# Patient Record
Sex: MALE | Race: WHITE | NOT HISPANIC OR LATINO | URBAN - METROPOLITAN AREA
[De-identification: names, ages, dates, MRNs, and addresses within clinical notes are randomized per-mention and may not be internally consistent; named-entity substitution may affect disease eponyms.]

---

## 2020-11-23 ENCOUNTER — OUTPATIENT (OUTPATIENT)
Dept: OUTPATIENT SERVICES | Facility: HOSPITAL | Age: 45
LOS: 1 days | End: 2020-11-23
Payer: COMMERCIAL

## 2020-11-23 DIAGNOSIS — Z11.59 ENCOUNTER FOR SCREENING FOR OTHER VIRAL DISEASES: ICD-10-CM

## 2020-11-23 LAB — SARS-COV-2 RNA SPEC QL NAA+PROBE: SIGNIFICANT CHANGE UP

## 2020-11-23 PROCEDURE — U0003: CPT

## 2020-11-24 DIAGNOSIS — Z11.59 ENCOUNTER FOR SCREENING FOR OTHER VIRAL DISEASES: ICD-10-CM

## 2023-06-01 ENCOUNTER — EMERGENCY (EMERGENCY)
Facility: HOSPITAL | Age: 48
LOS: 0 days | Discharge: HOME | End: 2023-06-01
Attending: EMERGENCY MEDICINE
Payer: COMMERCIAL

## 2023-06-01 VITALS
HEART RATE: 60 BPM | TEMPERATURE: 98 F | DIASTOLIC BLOOD PRESSURE: 70 MMHG | RESPIRATION RATE: 16 BRPM | OXYGEN SATURATION: 98 % | SYSTOLIC BLOOD PRESSURE: 118 MMHG

## 2023-06-01 VITALS — HEIGHT: 70 IN | WEIGHT: 179.9 LBS

## 2023-06-01 DIAGNOSIS — R11.2 NAUSEA WITH VOMITING, UNSPECIFIED: ICD-10-CM

## 2023-06-01 DIAGNOSIS — H55.09 OTHER FORMS OF NYSTAGMUS: ICD-10-CM

## 2023-06-01 DIAGNOSIS — H81.11 BENIGN PAROXYSMAL VERTIGO, RIGHT EAR: ICD-10-CM

## 2023-06-01 DIAGNOSIS — R42 DIZZINESS AND GIDDINESS: ICD-10-CM

## 2023-06-01 LAB
ALBUMIN SERPL ELPH-MCNC: 3.9 G/DL — SIGNIFICANT CHANGE UP (ref 3.3–5)
ALP SERPL-CCNC: 77 U/L — SIGNIFICANT CHANGE UP (ref 40–120)
ALT FLD-CCNC: 44 U/L — SIGNIFICANT CHANGE UP (ref 12–78)
ANION GAP SERPL CALC-SCNC: 1 MMOL/L — LOW (ref 5–17)
AST SERPL-CCNC: 23 U/L — SIGNIFICANT CHANGE UP (ref 15–37)
BASOPHILS # BLD AUTO: 0.05 K/UL — SIGNIFICANT CHANGE UP (ref 0–0.2)
BASOPHILS NFR BLD AUTO: 0.4 % — SIGNIFICANT CHANGE UP (ref 0–2)
BILIRUB SERPL-MCNC: 0.3 MG/DL — SIGNIFICANT CHANGE UP (ref 0.2–1.2)
BUN SERPL-MCNC: 15 MG/DL — SIGNIFICANT CHANGE UP (ref 7–23)
CALCIUM SERPL-MCNC: 9.2 MG/DL — SIGNIFICANT CHANGE UP (ref 8.5–10.1)
CHLORIDE SERPL-SCNC: 108 MMOL/L — SIGNIFICANT CHANGE UP (ref 96–108)
CO2 SERPL-SCNC: 29 MMOL/L — SIGNIFICANT CHANGE UP (ref 22–31)
CREAT SERPL-MCNC: 1.27 MG/DL — SIGNIFICANT CHANGE UP (ref 0.5–1.3)
EGFR: 70 ML/MIN/1.73M2 — SIGNIFICANT CHANGE UP
EOSINOPHIL # BLD AUTO: 0.08 K/UL — SIGNIFICANT CHANGE UP (ref 0–0.5)
EOSINOPHIL NFR BLD AUTO: 0.7 % — SIGNIFICANT CHANGE UP (ref 0–6)
GLUCOSE SERPL-MCNC: 104 MG/DL — HIGH (ref 70–99)
HCT VFR BLD CALC: 46.7 % — SIGNIFICANT CHANGE UP (ref 39–50)
HGB BLD-MCNC: 15.9 G/DL — SIGNIFICANT CHANGE UP (ref 13–17)
IMM GRANULOCYTES NFR BLD AUTO: 0.9 % — SIGNIFICANT CHANGE UP (ref 0–0.9)
LYMPHOCYTES # BLD AUTO: 2.48 K/UL — SIGNIFICANT CHANGE UP (ref 1–3.3)
LYMPHOCYTES # BLD AUTO: 22.1 % — SIGNIFICANT CHANGE UP (ref 13–44)
MAGNESIUM SERPL-MCNC: 2.3 MG/DL — SIGNIFICANT CHANGE UP (ref 1.6–2.6)
MCHC RBC-ENTMCNC: 30.6 PG — SIGNIFICANT CHANGE UP (ref 27–34)
MCHC RBC-ENTMCNC: 34 GM/DL — SIGNIFICANT CHANGE UP (ref 32–36)
MCV RBC AUTO: 90 FL — SIGNIFICANT CHANGE UP (ref 80–100)
MONOCYTES # BLD AUTO: 0.59 K/UL — SIGNIFICANT CHANGE UP (ref 0–0.9)
MONOCYTES NFR BLD AUTO: 5.2 % — SIGNIFICANT CHANGE UP (ref 2–14)
NEUTROPHILS # BLD AUTO: 7.94 K/UL — HIGH (ref 1.8–7.4)
NEUTROPHILS NFR BLD AUTO: 70.7 % — SIGNIFICANT CHANGE UP (ref 43–77)
PHOSPHATE SERPL-MCNC: 3.4 MG/DL — SIGNIFICANT CHANGE UP (ref 2.5–4.5)
PLATELET # BLD AUTO: 274 K/UL — SIGNIFICANT CHANGE UP (ref 150–400)
POTASSIUM SERPL-MCNC: 5 MMOL/L — SIGNIFICANT CHANGE UP (ref 3.5–5.3)
POTASSIUM SERPL-SCNC: 5 MMOL/L — SIGNIFICANT CHANGE UP (ref 3.5–5.3)
PROT SERPL-MCNC: 8.1 GM/DL — SIGNIFICANT CHANGE UP (ref 6–8.3)
RBC # BLD: 5.19 M/UL — SIGNIFICANT CHANGE UP (ref 4.2–5.8)
RBC # FLD: 13 % — SIGNIFICANT CHANGE UP (ref 10.3–14.5)
SODIUM SERPL-SCNC: 138 MMOL/L — SIGNIFICANT CHANGE UP (ref 135–145)
WBC # BLD: 11.24 K/UL — HIGH (ref 3.8–10.5)
WBC # FLD AUTO: 11.24 K/UL — HIGH (ref 3.8–10.5)

## 2023-06-01 PROCEDURE — 96374 THER/PROPH/DIAG INJ IV PUSH: CPT

## 2023-06-01 PROCEDURE — 36415 COLL VENOUS BLD VENIPUNCTURE: CPT

## 2023-06-01 PROCEDURE — 83735 ASSAY OF MAGNESIUM: CPT

## 2023-06-01 PROCEDURE — 99284 EMERGENCY DEPT VISIT MOD MDM: CPT

## 2023-06-01 PROCEDURE — 70450 CT HEAD/BRAIN W/O DYE: CPT | Mod: 26,MA

## 2023-06-01 PROCEDURE — 70450 CT HEAD/BRAIN W/O DYE: CPT | Mod: MA

## 2023-06-01 PROCEDURE — 85025 COMPLETE CBC W/AUTO DIFF WBC: CPT

## 2023-06-01 PROCEDURE — 80053 COMPREHEN METABOLIC PANEL: CPT

## 2023-06-01 PROCEDURE — 84100 ASSAY OF PHOSPHORUS: CPT

## 2023-06-01 PROCEDURE — 99284 EMERGENCY DEPT VISIT MOD MDM: CPT | Mod: 25

## 2023-06-01 RX ORDER — ONDANSETRON 8 MG/1
4 TABLET, FILM COATED ORAL ONCE
Refills: 0 | Status: COMPLETED | OUTPATIENT
Start: 2023-06-01 | End: 2023-06-01

## 2023-06-01 RX ORDER — MECLIZINE HCL 12.5 MG
1 TABLET ORAL
Qty: 21 | Refills: 0
Start: 2023-06-01 | End: 2023-06-07

## 2023-06-01 RX ORDER — SODIUM CHLORIDE 9 MG/ML
1000 INJECTION INTRAMUSCULAR; INTRAVENOUS; SUBCUTANEOUS ONCE
Refills: 0 | Status: COMPLETED | OUTPATIENT
Start: 2023-06-01 | End: 2023-06-01

## 2023-06-01 RX ORDER — ONDANSETRON 8 MG/1
1 TABLET, FILM COATED ORAL
Qty: 15 | Refills: 0
Start: 2023-06-01 | End: 2023-06-05

## 2023-06-01 RX ORDER — MECLIZINE HCL 12.5 MG
25 TABLET ORAL ONCE
Refills: 0 | Status: COMPLETED | OUTPATIENT
Start: 2023-06-01 | End: 2023-06-01

## 2023-06-01 RX ADMIN — SODIUM CHLORIDE 1000 MILLILITER(S): 9 INJECTION INTRAMUSCULAR; INTRAVENOUS; SUBCUTANEOUS at 18:29

## 2023-06-01 RX ADMIN — ONDANSETRON 4 MILLIGRAM(S): 8 TABLET, FILM COATED ORAL at 17:35

## 2023-06-01 RX ADMIN — Medication 25 MILLIGRAM(S): at 17:35

## 2023-06-01 NOTE — ED STATDOCS - PATIENT PORTAL LINK FT
You can access the FollowMyHealth Patient Portal offered by [facility] by registering at the following website: [email or web address]. By joining "[employer]’s FollowMyHealth portal, you will also be able to view your health information using other applications (apps) compatible with our system.

## 2023-06-01 NOTE — ED STATDOCS - NSFOLLOWUPINSTRUCTIONS_ED_ALL_ED_FT
go  Vertigo is the feeling that you or your surroundings are moving when they are not. Benign positional vertigo is the most common form of vertigo. This is usually a harmless condition (benign). This condition is positional. This means that symptoms are triggered by certain movements and positions.    This condition can be dangerous if it occurs while you are doing something that could cause harm to yourself or others. This includes activities such as driving or operating machinery.    What are the causes?  The inner ear has fluid-filled canals that help your brain sense movement and balance. When the fluid moves, the brain receives messages about your body's position.    With benign positional vertigo, calcium crystals in the inner ear break free and disturb the inner ear area. This causes your brain to receive confusing messages about your body's position.    What increases the risk?  You are more likely to develop this condition if:  You are a woman.  You are 50 years of age or older.  You have recently had a head injury.  You have an inner ear disease.  What are the signs or symptoms?  Symptoms of this condition usually happen when you move your head or your eyes in different directions. Symptoms may start suddenly and usually last for less than a minute. They include:  Loss of balance and falling.  Feeling like you are spinning or moving.  Feeling like your surroundings are spinning or moving.  Nausea and vomiting.  Blurred vision.  Dizziness.  Involuntary eye movement (nystagmus).  Symptoms can be mild and cause only minor problems, or they can be severe and interfere with daily life. Episodes of benign positional vertigo may return (recur) over time. Symptoms may also improve over time.    How is this diagnosed?  This condition may be diagnosed based on:  Your medical history.  A physical exam of the head, neck, and ears.  Positional tests to check for or stimulate vertigo. You may be asked to turn your head and change positions, such as going from sitting to lying down. A health care provider will watch for symptoms of vertigo.  You may be referred to a health care provider who specializes in ear, nose, and throat problems (ENT or otolaryngologist) or a provider who specializes in disorders of the nervous system (neurologist).    How is this treated?  Medical person standing behind sitting patient using both hands to move patient's head to another position.  This condition may be treated in a session in which your health care provider moves your head in specific positions to help the displaced crystals in your inner ear move. Treatment for this condition may take several sessions. Surgery may be needed in severe cases, but this is rare.    In some cases, benign positional vertigo may resolve on its own in 2–4 weeks.    Follow these instructions at home:  Safety    Move slowly. Avoid sudden body or head movements or certain positions, as told by your health care provider.  Avoid driving or operating machinery until your health care provider says it is safe.  Avoid doing any tasks that would be dangerous to you or others if vertigo occurs.  If you have trouble walking or keeping your balance, try using a cane for stability. If you feel dizzy or unstable, sit down right away.  Return to your normal activities as told by your health care provider. Ask your health care provider what activities are safe for you.  General instructions    Take over-the-counter and prescription medicines only as told by your health care provider.  Drink enough fluid to keep your urine pale yellow.  Keep all follow-up visits. This is important.  Contact a health care provider if:  You have a fever.  Your condition gets worse or you develop new symptoms.  Your family or friends notice any behavioral changes.  You have nausea or vomiting that gets worse.  You have numbness or a prickling and tingling sensation.  Get help right away if you:  Have difficulty speaking or moving.  Are always dizzy or faint.  Develop severe headaches.  Have weakness in your legs or arms.  Have changes in your hearing or vision.  Develop a stiff neck.  Develop sensitivity to light.  These symptoms may represent a serious problem that is an emergency. Do not wait to see if the symptoms will go away. Get medical help right away. Call your local emergency services (911 in the [place] Do not drive yourself to the hospital.    Summary  Vertigo is the feeling that you or your surroundings are moving when they are not. Benign positional vertigo is the most common form of vertigo.  This condition is caused by calcium crystals in the inner ear that become displaced. This causes a disturbance in an area of the inner ear that helps your brain sense movement and balance.  Symptoms include loss of balance and falling, feeling that you or your surroundings are moving, nausea and vomiting, and blurred vision.  This condition can be diagnosed based on symptoms, a physical exam, and positional tests.  Follow safety instructions as told by your health care provider and keep all follow-up visits. This is important.  This information is not intended to replace advice given to you by your health care provider. Make sure you discuss any questions you have with your health care provider.    Document Revised: 11/17/2021 Document Reviewed: 11/17/2021

## 2023-06-01 NOTE — ED ADULT NURSE NOTE - NSFALLHARMRISKINTERV_ED_ALL_ED

## 2023-06-01 NOTE — ED STATDOCS - NEUROLOGICAL, MLM
+right beating horizontal nystagmus, fatigable. no facial droop, no dysarthria, no aphasia, no dysmetria. no focal motor deficits, +dixhall pike on left.

## 2023-06-01 NOTE — ED STATDOCS - PROGRESS NOTE DETAILS
47 y/o Male presents to ED c/o nausea, vomiting and dizziness.  Pt reports he had dizziness, spinning sensation, sweating, and vomiting x 2 last night.  This morning, pt also had same episode when he stood up to get out of bed.  Vomited x 2.  Reports he feels better laying down with eyes closed.  Pt is worse when he stands up.  Denies falls, head injury.  Neg prior episodes.  Pt also reports recent nasal congestion, allergies 2 weeks ago.   Diana Corey PA-C

## 2023-06-01 NOTE — ED STATDOCS - CLINICAL SUMMARY MEDICAL DECISION MAKING FREE TEXT BOX
Labs and CT negative.  Patient with symptoms and exam consistent with BPPV.  Treated with medications in the ED.  D/c home on meclizine, f/u with ENT. Return precautions given.

## 2023-06-01 NOTE — ED STATDOCS - OBJECTIVE STATEMENT
48 year old male with no significant past medical hx presents to the ED c/o nausea, vomiting since last night. States he ate chocolate, cheesecake, honey last night and x2 hours later, felt dizzy, sweaty, and began vomiting. Patient also endorses dizziness worse with movement, describes it as feeling lightheaded. States he felt dizzy when he woke up and later began feeling nauseous and vomited. +allergies a few weeks ago. Denies diarrhea, fever, tinnitus. Denies chest pain or SOB.

## 2023-06-01 NOTE — ED ADULT TRIAGE NOTE - CHIEF COMPLAINT QUOTE
pt presents to ED c/o nausea, vomiting, dizziness since last night. pt denies fevers. unable to hold food, liquids down.

## 2023-12-07 NOTE — ED ADULT NURSE NOTE - CHIEF COMPLAINT QUOTE
Spoke with the patient and advised him of the message below . Pt voiced understanding .  Call complete Thank you so much!! We appreciate it pt presents to ED c/o nausea, vomiting, dizziness since last night. pt denies fevers. unable to hold food, liquids down.

## 2024-11-17 ENCOUNTER — INPATIENT (INPATIENT)
Facility: HOSPITAL | Age: 49
LOS: 0 days | Discharge: ANOTHER HOSPITAL | DRG: 282 | End: 2024-11-18
Attending: INTERNAL MEDICINE | Admitting: INTERNAL MEDICINE
Payer: COMMERCIAL

## 2024-11-17 VITALS
TEMPERATURE: 98 F | SYSTOLIC BLOOD PRESSURE: 121 MMHG | OXYGEN SATURATION: 98 % | WEIGHT: 220.02 LBS | RESPIRATION RATE: 18 BRPM | HEIGHT: 69 IN | DIASTOLIC BLOOD PRESSURE: 80 MMHG | HEART RATE: 70 BPM

## 2024-11-17 DIAGNOSIS — I21.4 NON-ST ELEVATION (NSTEMI) MYOCARDIAL INFARCTION: ICD-10-CM

## 2024-11-17 LAB
ALBUMIN SERPL ELPH-MCNC: 3.8 G/DL — SIGNIFICANT CHANGE UP (ref 3.3–5)
ALP SERPL-CCNC: 81 U/L — SIGNIFICANT CHANGE UP (ref 40–120)
ALT FLD-CCNC: 44 U/L — SIGNIFICANT CHANGE UP (ref 12–78)
ANION GAP SERPL CALC-SCNC: 1 MMOL/L — LOW (ref 5–17)
APTT BLD: 39.2 SEC — HIGH (ref 24.5–35.6)
AST SERPL-CCNC: 22 U/L — SIGNIFICANT CHANGE UP (ref 15–37)
BASOPHILS # BLD AUTO: 0.05 K/UL — SIGNIFICANT CHANGE UP (ref 0–0.2)
BASOPHILS NFR BLD AUTO: 0.5 % — SIGNIFICANT CHANGE UP (ref 0–2)
BILIRUB SERPL-MCNC: 0.3 MG/DL — SIGNIFICANT CHANGE UP (ref 0.2–1.2)
BUN SERPL-MCNC: 19 MG/DL — SIGNIFICANT CHANGE UP (ref 7–23)
CALCIUM SERPL-MCNC: 9 MG/DL — SIGNIFICANT CHANGE UP (ref 8.5–10.1)
CHLORIDE SERPL-SCNC: 109 MMOL/L — HIGH (ref 96–108)
CO2 SERPL-SCNC: 28 MMOL/L — SIGNIFICANT CHANGE UP (ref 22–31)
CREAT SERPL-MCNC: 1.3 MG/DL — SIGNIFICANT CHANGE UP (ref 0.5–1.3)
EGFR: 67 ML/MIN/1.73M2 — SIGNIFICANT CHANGE UP
EOSINOPHIL # BLD AUTO: 0.32 K/UL — SIGNIFICANT CHANGE UP (ref 0–0.5)
EOSINOPHIL NFR BLD AUTO: 3.1 % — SIGNIFICANT CHANGE UP (ref 0–6)
GLUCOSE SERPL-MCNC: 116 MG/DL — HIGH (ref 70–99)
HCT VFR BLD CALC: 43.6 % — SIGNIFICANT CHANGE UP (ref 39–50)
HGB BLD-MCNC: 15 G/DL — SIGNIFICANT CHANGE UP (ref 13–17)
IMM GRANULOCYTES NFR BLD AUTO: 0.6 % — SIGNIFICANT CHANGE UP (ref 0–0.9)
INR BLD: 0.94 RATIO — SIGNIFICANT CHANGE UP (ref 0.85–1.16)
LYMPHOCYTES # BLD AUTO: 3.85 K/UL — HIGH (ref 1–3.3)
LYMPHOCYTES # BLD AUTO: 37.1 % — SIGNIFICANT CHANGE UP (ref 13–44)
MCHC RBC-ENTMCNC: 30.5 PG — SIGNIFICANT CHANGE UP (ref 27–34)
MCHC RBC-ENTMCNC: 34.4 G/DL — SIGNIFICANT CHANGE UP (ref 32–36)
MCV RBC AUTO: 88.6 FL — SIGNIFICANT CHANGE UP (ref 80–100)
MONOCYTES # BLD AUTO: 0.84 K/UL — SIGNIFICANT CHANGE UP (ref 0–0.9)
MONOCYTES NFR BLD AUTO: 8.1 % — SIGNIFICANT CHANGE UP (ref 2–14)
NEUTROPHILS # BLD AUTO: 5.25 K/UL — SIGNIFICANT CHANGE UP (ref 1.8–7.4)
NEUTROPHILS NFR BLD AUTO: 50.6 % — SIGNIFICANT CHANGE UP (ref 43–77)
NRBC # BLD: 0 /100 WBCS — SIGNIFICANT CHANGE UP (ref 0–0)
NT-PROBNP SERPL-SCNC: 46 PG/ML — SIGNIFICANT CHANGE UP (ref 0–125)
PLATELET # BLD AUTO: 313 K/UL — SIGNIFICANT CHANGE UP (ref 150–400)
POTASSIUM SERPL-MCNC: 4.1 MMOL/L — SIGNIFICANT CHANGE UP (ref 3.5–5.3)
POTASSIUM SERPL-SCNC: 4.1 MMOL/L — SIGNIFICANT CHANGE UP (ref 3.5–5.3)
PROT SERPL-MCNC: 7.6 G/DL — SIGNIFICANT CHANGE UP (ref 6–8.3)
PROTHROM AB SERPL-ACNC: 11 SEC — SIGNIFICANT CHANGE UP (ref 9.9–13.4)
RBC # BLD: 4.92 M/UL — SIGNIFICANT CHANGE UP (ref 4.2–5.8)
RBC # FLD: 13.2 % — SIGNIFICANT CHANGE UP (ref 10.3–14.5)
SODIUM SERPL-SCNC: 138 MMOL/L — SIGNIFICANT CHANGE UP (ref 135–145)
TROPONIN I, HIGH SENSITIVITY RESULT: 207.6 NG/L — HIGH
TROPONIN I, HIGH SENSITIVITY RESULT: 24.1 NG/L — SIGNIFICANT CHANGE UP
TROPONIN I, HIGH SENSITIVITY RESULT: 271.4 NG/L — HIGH
WBC # BLD: 10.37 K/UL — SIGNIFICANT CHANGE UP (ref 3.8–10.5)
WBC # FLD AUTO: 10.37 K/UL — SIGNIFICANT CHANGE UP (ref 3.8–10.5)

## 2024-11-17 PROCEDURE — 93010 ELECTROCARDIOGRAM REPORT: CPT

## 2024-11-17 PROCEDURE — 74174 CTA ABD&PLVS W/CONTRAST: CPT | Mod: 26,MC

## 2024-11-17 PROCEDURE — 99291 CRITICAL CARE FIRST HOUR: CPT

## 2024-11-17 PROCEDURE — 71275 CT ANGIOGRAPHY CHEST: CPT | Mod: 26,MC

## 2024-11-17 RX ORDER — MAGNESIUM, ALUMINUM HYDROXIDE 200-225/5
30 SUSPENSION, ORAL (FINAL DOSE FORM) ORAL EVERY 4 HOURS
Refills: 0 | Status: DISCONTINUED | OUTPATIENT
Start: 2024-11-17 | End: 2024-11-18

## 2024-11-17 RX ORDER — ACETAMINOPHEN 500MG 500 MG/1
1000 TABLET, COATED ORAL ONCE
Refills: 0 | Status: COMPLETED | OUTPATIENT
Start: 2024-11-17 | End: 2024-11-17

## 2024-11-17 RX ORDER — HEPARIN SODIUM,PORCINE 1000/ML
6000 VIAL (ML) INJECTION EVERY 6 HOURS
Refills: 0 | Status: DISCONTINUED | OUTPATIENT
Start: 2024-11-17 | End: 2024-11-18

## 2024-11-17 RX ORDER — PANTOPRAZOLE SODIUM 40 MG/1
40 TABLET, DELAYED RELEASE ORAL DAILY
Refills: 0 | Status: DISCONTINUED | OUTPATIENT
Start: 2024-11-17 | End: 2024-11-18

## 2024-11-17 RX ORDER — HEPARIN SODIUM,PORCINE 1000/ML
VIAL (ML) INJECTION
Qty: 25000 | Refills: 0 | Status: DISCONTINUED | OUTPATIENT
Start: 2024-11-17 | End: 2024-11-18

## 2024-11-17 RX ORDER — CLOPIDOGREL 75 MG/1
300 TABLET, FILM COATED ORAL ONCE
Refills: 0 | Status: COMPLETED | OUTPATIENT
Start: 2024-11-17 | End: 2024-11-17

## 2024-11-17 RX ORDER — ALPRAZOLAM 0.5 MG
0.25 TABLET ORAL EVERY 8 HOURS
Refills: 0 | Status: DISCONTINUED | OUTPATIENT
Start: 2024-11-17 | End: 2024-11-18

## 2024-11-17 RX ORDER — HEPARIN SODIUM,PORCINE 1000/ML
5000 VIAL (ML) INJECTION ONCE
Refills: 0 | Status: COMPLETED | OUTPATIENT
Start: 2024-11-17 | End: 2024-11-17

## 2024-11-17 RX ORDER — SODIUM CHLORIDE 9 MG/ML
1000 INJECTION, SOLUTION INTRAMUSCULAR; INTRAVENOUS; SUBCUTANEOUS ONCE
Refills: 0 | Status: COMPLETED | OUTPATIENT
Start: 2024-11-17 | End: 2024-11-17

## 2024-11-17 RX ORDER — TICAGRELOR 90 MG/1
180 TABLET ORAL ONCE
Refills: 0 | Status: COMPLETED | OUTPATIENT
Start: 2024-11-17 | End: 2024-11-17

## 2024-11-17 RX ADMIN — Medication 1000 UNIT(S)/HR: at 19:29

## 2024-11-17 RX ADMIN — SODIUM CHLORIDE 1000 MILLILITER(S): 9 INJECTION, SOLUTION INTRAMUSCULAR; INTRAVENOUS; SUBCUTANEOUS at 15:38

## 2024-11-17 RX ADMIN — Medication 30 MILLILITER(S): at 15:38

## 2024-11-17 RX ADMIN — TICAGRELOR 180 MILLIGRAM(S): 90 TABLET ORAL at 19:28

## 2024-11-17 RX ADMIN — ACETAMINOPHEN 500MG 1000 MILLIGRAM(S): 500 TABLET, COATED ORAL at 16:48

## 2024-11-17 RX ADMIN — ACETAMINOPHEN 500MG 400 MILLIGRAM(S): 500 TABLET, COATED ORAL at 15:38

## 2024-11-17 RX ADMIN — Medication 5000 UNIT(S): at 19:28

## 2024-11-17 NOTE — ED ADULT NURSE NOTE - SUICIDE SCREENING QUESTION 2
ED resident cross cover, giving 45 of sodium phos for phos of 1.1.        Sherie Wesis, DO  Resident  09/16/21 1040     Patient unable to complete

## 2024-11-17 NOTE — ED PROVIDER NOTE - CLINICAL SUMMARY MEDICAL DECISION MAKING FREE TEXT BOX
Caroline ATTG   49-year-old male without any significant past medical additions chief complaint of chest pain.  Symptom started approximately 1 hour ago prior to arrival to the ED.  Patient has hard time describing pain, states it feels like it is inbetween his shoulder blades and will come and go.     GENERAL: Awake, alert, NAD  LUNGS: CTAB, no wheezes or crackles   CARDIAC: RRR, no m/r/g  ABDOMEN: Soft, , non tender, non distended, no rebound, no guarding  BACK: No midline spinal tenderness, no CVA tenderness no tenderness along the back  EXT: No edema, no calf tenderness,, no deformities.  NEURO: A&Ox3. Moving all extremities.  SKIN: Warm and dry. No rash.  PSYCH: Normal affect.    given hx and pe will need dissection study, at the end of the visit / encounter w/ pt pt states pain calmed down will need cardiac /pulm ruelas

## 2024-11-17 NOTE — ED PROVIDER NOTE - PROGRESS NOTE DETAILS
Caroline ATTG  Cardiology aware of NSTEMI recommended heparin    pt chest pain free   PCP Celestina Moser

## 2024-11-17 NOTE — CONSULT NOTE ADULT - SUBJECTIVE AND OBJECTIVE BOX
[facility]     Patient is a 49y old  Male who presents with a chief complaint of     HPI:      HPI:    49y Male for Cardiology Consult    PAST MEDICAL & SURGICAL HISTORY:      FAMILY HISTORY:      SOCIAL HISTORY:   Alcohol:  Smoking:    Allergies    No Known Allergies    Intolerances        MEDICATIONS  (STANDING):  heparin  Infusion.  Unit(s)/Hr (10 mL/Hr) IV Continuous <Continuous>    MEDICATIONS  (PRN):  aluminum hydroxide/magnesium hydroxide/simethicone Suspension 30 milliLiter(s) Oral every 4 hours PRN Dyspepsia  heparin   Injectable 6000 Unit(s) IV Push every 6 hours PRN For aPTT less than 40      REVIEW OF SYSTEMS:  CONSTITUTIONAL: No fever, weight loss, chills, shakes, or fat  RESPIRATORY: No cough, wheezing, hemoptysis, or shortness of breath  CARDIOVASCULAR: No chest pain, dyspnea, palpitations, dizziness, syncope, paroxysmal nocturnal dyspnea, orthopnea, or arm or leg swelling  GASTROINTESTINAL: No abdominal  or epigastric pain, nausea, vomiting, hematemesis, diarrhea, constipation, melena or bright red bloo  NEUROLOGICAL: No headaches, memory loss, slurred speech, limb weakness, loss of strength, numbness, or tremors  SKIN: No itching, burning, rashes, or lesions  ENDOCRINE: No heat or cold intolerance, or hair loss  MUSCULOSKELETAL: No joint pain or swelling, muscle, back, or extremity pain  HEME/LYMPH: No easy bruising or bleeding gums  ALLERY AND IMMUNOLOGIC: No hives or rash.    Vital Signs Last 24 Hrs  T(C): 36.8 (17 Nov 2024 19:42), Max: 36.8 (17 Nov 2024 14:48)  T(F): 98.2 (17 Nov 2024 19:42), Max: 98.3 (17 Nov 2024 15:44)  HR: 58 (17 Nov 2024 19:42) (58 - 70)  BP: 123/73 (17 Nov 2024 19:42) (120/80 - 123/73)  BP(mean): --  RR: 18 (17 Nov 2024 19:42) (18 - 18)  SpO2: 97% (17 Nov 2024 19:42) (97% - 98%)    Parameters below as of 17 Nov 2024 19:42  Patient On (Oxygen Delivery Method): room air        PHYSICAL EXAM:  HEAD:  Atraumatic, Normocephalic  EYES: EOMI, PERRLA, conjunctiva and sclera clear  NECK: Supple and normal thyroid.  No JVD or carotid bruit.   HEART: S1, S2 regular , 1/6 soft ejection systolic murmur in mitral area , no thrill and no gallops .  PULMONARY: Bilateral vesicular breathing , few scattered ronchi and few scattered rales are present .  ABDOMEN: Soft nontender and positive bowl sounds   EXTREMITIES:  No clubbing, cyanosis, or pedal  edema  NEUROLOGICAL: AAOX3 , no focal deficit .    LABS:                        15.0   10.37 )-----------( 313      ( 17 Nov 2024 15:22 )             43.6     11-17    138  |  109[H]  |  19  ----------------------------<  116[H]  4.1   |  28  |  1.30    Ca    9.0      17 Nov 2024 15:22    TPro  7.6  /  Alb  3.8  /  TBili  0.3  /  DBili  x   /  AST  22  /  ALT  44  /  AlkPhos  81  11-17        PT/INR - ( 17 Nov 2024 15:22 )   PT: 11.0 sec;   INR: 0.94 ratio         PTT - ( 17 Nov 2024 15:22 )  PTT:39.2 sec  Urinalysis Basic - ( 17 Nov 2024 15:22 )    Color: x / Appearance: x / SG: x / pH: x  Gluc: 116 mg/dL / Ketone: x  / Bili: x / Urobili: x   Blood: x / Protein: x / Nitrite: x   Leuk Esterase: x / RBC: x / WBC x   Sq Epi: x / Non Sq Epi: x / Bacteria: x      BNP      EKG:  ECHO:  IMAGING:    Assessment and Plan :     Will continue to follow during hospital course and give further recommendations as needed . Thanks for your referral . if any questions please contact me at office [phone] [phone])  MOR BLISS MD [place] [id number]  [address]  OFFICE : [phone]   CELL : [phone]  CARDIOLOGY CONSULT :     Patient is a 49y old  Male who presents with a chief complaint of has retrosternal chest pain since 1 PM today and radiation of pain to back and jaw and left arm and call 911 and came to ER . patient felt better in ER after aspirin and nitrates . Patient has  no chest pain at the time of my examination and feeling much better . Patient never has this kind of chest pain in the past . Patient has very active life style and can walk 1 mile and even more prior to this episode although patient has sedentary life style . Patient understands all questions very well and answers appropriately . Patient history obtained from patient himself , RN , PA , PMD and other consultants notes . Patient has mild SOB with chest pain and feeling better now .    PAST MEDICAL & SURGICAL HISTORY:  Hyperlipidemia     FAMILY HISTORY: H/O CAD in family       SOCIAL HISTORY:   Alcohol: Patient drinks alcohol socially occasionally   Smoking: Patient smokes 10-15 cigarette daily .     Allergies    No Known Allergies    Intolerances        MEDICATIONS  (STANDING):  heparin  Infusion.  Unit(s)/Hr (10 mL/Hr) IV Continuous <Continuous>    MEDICATIONS  (PRN):  aluminum hydroxide/magnesium hydroxide/simethicone Suspension 30 milliLiter(s) Oral every 4 hours PRN Dyspepsia  heparin   Injectable 6000 Unit(s) IV Push every 6 hours PRN For aPTT less than 40      REVIEW OF SYSTEMS:  CONSTITUTIONAL: No fever .  RESPIRATORY: No cough, wheezing, but has mild  shortness of breath which is better .  CARDIOVASCULAR: Patient has  chest pain which is better and SOB also better and has no ,  palpitations, dizziness, syncope, paroxysmal nocturnal dyspnea,  or arm or leg swelling  GASTROINTESTINAL: No abdominal  or epigastric pain, nausea, vomiting, hematemesis, diarrhea, constipation, melena or bright red blood  NEUROLOGICAL: No headaches, memory loss, slurred speech, limb weakness, loss of strength, numbness, or tremors  SKIN: No itching, burning, rashes, or lesions  ENDOCRINE: No heat or cold intolerance, or hair loss  MUSCULOSKELETAL: Patient has mild arthritis   HEME/LYMPH: No easy bruising or bleeding gums  ALLERGY AND IMMUNOLOGIC: No hives or rash.    Vital Signs Last 24 Hrs  T(C): 36.8 (17 Nov 2024 19:42), Max: 36.8 (17 Nov 2024 14:48)  T(F): 98.2 (17 Nov 2024 19:42), Max: 98.3 (17 Nov 2024 15:44)  HR: 58 (17 Nov 2024 19:42) (58 - 70)  BP: 123/73 (17 Nov 2024 19:42) (120/80 - 123/73)  BP(mean): --  RR: 18 (17 Nov 2024 19:42) (18 - 18)  SpO2: 97% (17 Nov 2024 19:42) (97% - 98%)    Parameters below as of 17 Nov 2024 19:42  Patient On (Oxygen Delivery Method): room air        PHYSICAL EXAM:  HEAD:  Atraumatic, Normocephalic  EYES: EOMI, PERRLA, conjunctiva and sclera clear  NECK: Supple and normal thyroid.  No JVD or carotid bruit.   HEART: S1, S2 regular , 1/6 soft ejection systolic murmur in mitral area , no thrill and no gallops .  PULMONARY: Bilateral vesicular breathing , few scattered rhonchi and few scattered rales are present .  ABDOMEN: Soft nontender and positive bowl sounds   EXTREMITIES:  No clubbing, cyanosis, or pedal  edema  NEUROLOGICAL: AAOX3 , no focal deficit .  Skin : NO rashes .  Musculoskeletal : No joint swellings .     LABS:                        15.0   10.37 )-----------( 313      ( 17 Nov 2024 15:22 )             43.6     11-17    138  |  109[H]  |  19  ----------------------------<  116[H]  4.1   |  28  |  1.30    Ca    9.0      17 Nov 2024 15:22    TPro  7.6  /  Alb  3.8  /  TBili  0.3  /  DBili  x   /  AST  22  /  ALT  44  /  AlkPhos  81  11-17        PT/INR - ( 17 Nov 2024 15:22 )   PT: 11.0 sec;   INR: 0.94 ratio         PTT - ( 17 Nov 2024 15:22 )  PTT:39.2 sec  Urinalysis Basic - ( 17 Nov 2024 15:22 )    Color: x / Appearance: x / SG: x / pH: x  Gluc: 116 mg/dL / Ketone: x  / Bili: x / Urobili: x   Blood: x / Protein: x / Nitrite: x   Leuk Esterase: x / RBC: x / WBC x   Sq Epi: x / Non Sq Epi: x / Bacteria: x            EKG: NSR , non specific ST-T changes .    Assessment and Plan :   49y old  Male who presents with a chief complaint of has retrosternal chest pain since 1 PM today and radiation of pain to back and jaw and left arm and call 911 and came to ER . patient felt better in ER after aspirin and nitrates . Patient has  no chest pain at the time of my examination and feeling much better . Patient never has this kind of chest pain in the past . Patient has very active life style and can walk 1 mile and even more prior to this episode although patient has sedentary life style . Patient understands all questions very well and answers appropriately . Patient history obtained from patient himself , RN , PA , PMD and other consultants notes . Patient has mild SOB with chest pain and feeling better now . Patient has elevated Troponin I levels and trending up . Patient has ACS , NON-ST CELIA . Continue aspirin , I/V heparin drip , Plavix and metoprolol and atorvastatin and nitrates as ordered . Will arrange cardiac cath in am . Patient cardiac wise stable and cleared for cardiac cath . Patient understands cardiac cath procedures and agrees with that . Will talk with cath lab in am for cardiac cath . Rest of medications as such .   Will continue to follow during hospital course and give further recommendations as needed . Thanks for your referral . if any questions please contact me at office ([phone] cell [phone])

## 2024-11-17 NOTE — CONSULT NOTE ADULT - TIME BILLING
in direct care of patient , reviewing labs and other results and adjusting medications and in discussion with other consultants , RN , PMD and ER physician and PA

## 2024-11-17 NOTE — ED PROVIDER NOTE - CRITICAL CARE ATTENDING CONTRIBUTION TO CARE
Patient has NSTEMI requiring critical care spoke to cardiology elevated troponin 10 times increased.  After experiencing a severe episode of back pain.   Requiring a heparin drip

## 2024-11-17 NOTE — ED ADULT NURSE REASSESSMENT NOTE - NSFALLHARMRISKINTERV_ED_ALL_ED

## 2024-11-18 ENCOUNTER — RESULT REVIEW (OUTPATIENT)
Age: 49
End: 2024-11-18

## 2024-11-18 ENCOUNTER — INPATIENT (INPATIENT)
Facility: HOSPITAL | Age: 49
LOS: 8 days | Discharge: HOME HEALTH | End: 2024-11-27
Attending: THORACIC SURGERY (CARDIOTHORACIC VASCULAR SURGERY) | Admitting: THORACIC SURGERY (CARDIOTHORACIC VASCULAR SURGERY)
Payer: COMMERCIAL

## 2024-11-18 ENCOUNTER — TRANSCRIPTION ENCOUNTER (OUTPATIENT)
Age: 49
End: 2024-11-18

## 2024-11-18 VITALS
RESPIRATION RATE: 18 BRPM | DIASTOLIC BLOOD PRESSURE: 85 MMHG | HEART RATE: 59 BPM | SYSTOLIC BLOOD PRESSURE: 142 MMHG | OXYGEN SATURATION: 100 % | TEMPERATURE: 98 F

## 2024-11-18 DIAGNOSIS — I24.9 ACUTE ISCHEMIC HEART DISEASE, UNSPECIFIED: ICD-10-CM
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
## 2024-11-18 DIAGNOSIS — M79.A11 NONTRAUMATIC COMPARTMENT SYNDROME OF RIGHT UPPER EXTREMITY: ICD-10-CM

## 2024-11-18 DIAGNOSIS — J98.11 ATELECTASIS: ICD-10-CM
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
## 2024-11-18 DIAGNOSIS — I21.4 NON-ST ELEVATION (NSTEMI) MYOCARDIAL INFARCTION: ICD-10-CM

## 2024-11-18 DIAGNOSIS — Z29.9 ENCOUNTER FOR PROPHYLACTIC MEASURES, UNSPECIFIED: ICD-10-CM

## 2024-11-18 DIAGNOSIS — I97.190 OTHER POSTPROCEDURAL CARDIAC FUNCTIONAL DISTURBANCES FOLLOWING CARDIAC SURGERY: ICD-10-CM

## 2024-11-18 DIAGNOSIS — I71.43 INFRARENAL ABDOMINAL AORTIC ANEURYSM, WITHOUT RUPTURE: ICD-10-CM

## 2024-11-18 DIAGNOSIS — I48.91 UNSPECIFIED ATRIAL FIBRILLATION: ICD-10-CM

## 2024-11-18 DIAGNOSIS — F17.200 NICOTINE DEPENDENCE, UNSPECIFIED, UNCOMPLICATED: ICD-10-CM

## 2024-11-18 DIAGNOSIS — K76.0 FATTY (CHANGE OF) LIVER, NOT ELSEWHERE CLASSIFIED: ICD-10-CM

## 2024-11-18 DIAGNOSIS — R91.8 OTHER NONSPECIFIC ABNORMAL FINDING OF LUNG FIELD: ICD-10-CM

## 2024-11-18 DIAGNOSIS — K59.00 CONSTIPATION, UNSPECIFIED: ICD-10-CM

## 2024-11-18 DIAGNOSIS — D68.32 HEMORRHAGIC DISORDER DUE TO EXTRINSIC CIRCULATING ANTICOAGULANTS: ICD-10-CM

## 2024-11-18 DIAGNOSIS — I25.110 ATHEROSCLEROTIC HEART DISEASE OF NATIVE CORONARY ARTERY WITH UNSTABLE ANGINA PECTORIS: ICD-10-CM

## 2024-11-18 DIAGNOSIS — T45.515A ADVERSE EFFECT OF ANTICOAGULANTS, INITIAL ENCOUNTER: ICD-10-CM

## 2024-11-18 DIAGNOSIS — D62 ACUTE POSTHEMORRHAGIC ANEMIA: ICD-10-CM

## 2024-11-18 DIAGNOSIS — R00.1 BRADYCARDIA, UNSPECIFIED: ICD-10-CM

## 2024-11-18 DIAGNOSIS — L76.32 POSTPROCEDURAL HEMATOMA OF SKIN AND SUBCUTANEOUS TISSUE FOLLOWING OTHER PROCEDURE: ICD-10-CM

## 2024-11-18 PROBLEM — Z78.9 OTHER SPECIFIED HEALTH STATUS: Chronic | Status: ACTIVE | Noted: 2023-06-03

## 2024-11-18 LAB
A1C WITH ESTIMATED AVERAGE GLUCOSE RESULT: 5.6 % — SIGNIFICANT CHANGE UP (ref 4–5.6)
A1C WITH ESTIMATED AVERAGE GLUCOSE RESULT: 5.7 % — HIGH (ref 4–5.6)
ALBUMIN SERPL ELPH-MCNC: 3.2 G/DL — LOW (ref 3.3–5)
ALBUMIN SERPL ELPH-MCNC: 4.3 G/DL — SIGNIFICANT CHANGE UP (ref 3.3–5)
ALP SERPL-CCNC: 73 U/L — SIGNIFICANT CHANGE UP (ref 40–120)
ALP SERPL-CCNC: 80 U/L — SIGNIFICANT CHANGE UP (ref 40–120)
ALT FLD-CCNC: 28 U/L — SIGNIFICANT CHANGE UP (ref 10–45)
ALT FLD-CCNC: 39 U/L — SIGNIFICANT CHANGE UP (ref 12–78)
ANION GAP SERPL CALC-SCNC: 10 MMOL/L — SIGNIFICANT CHANGE UP (ref 5–17)
ANION GAP SERPL CALC-SCNC: 11 MMOL/L — SIGNIFICANT CHANGE UP (ref 5–17)
APPEARANCE UR: CLEAR — SIGNIFICANT CHANGE UP
APTT BLD: 37.7 SEC — HIGH (ref 24.5–35.6)
APTT BLD: 53.6 SEC — HIGH (ref 24.5–35.6)
AST SERPL-CCNC: 21 U/L — SIGNIFICANT CHANGE UP (ref 10–40)
AST SERPL-CCNC: 25 U/L — SIGNIFICANT CHANGE UP (ref 15–37)
BASOPHILS # BLD AUTO: 0.05 K/UL — SIGNIFICANT CHANGE UP (ref 0–0.2)
BASOPHILS NFR BLD AUTO: 0.5 % — SIGNIFICANT CHANGE UP (ref 0–2)
BILIRUB SERPL-MCNC: 0.2 MG/DL — SIGNIFICANT CHANGE UP (ref 0.2–1.2)
BILIRUB SERPL-MCNC: 0.3 MG/DL — SIGNIFICANT CHANGE UP (ref 0.2–1.2)
BILIRUB UR-MCNC: NEGATIVE — SIGNIFICANT CHANGE UP
BLD GP AB SCN SERPL QL: NEGATIVE — SIGNIFICANT CHANGE UP
BUN SERPL-MCNC: 14 MG/DL — SIGNIFICANT CHANGE UP (ref 7–23)
BUN SERPL-MCNC: 19 MG/DL — SIGNIFICANT CHANGE UP (ref 7–23)
CALCIUM SERPL-MCNC: 7.9 MG/DL — LOW (ref 8.5–10.1)
CALCIUM SERPL-MCNC: 8.9 MG/DL — SIGNIFICANT CHANGE UP (ref 8.4–10.5)
CHLORIDE SERPL-SCNC: 104 MMOL/L — SIGNIFICANT CHANGE UP (ref 96–108)
CHLORIDE SERPL-SCNC: 109 MMOL/L — HIGH (ref 96–108)
CHOLEST SERPL-MCNC: 262 MG/DL — HIGH
CHOLEST SERPL-MCNC: 278 MG/DL — HIGH
CK MB CFR SERPL CALC: 5 NG/ML — SIGNIFICANT CHANGE UP (ref 0–6.7)
CK SERPL-CCNC: 188 U/L — SIGNIFICANT CHANGE UP (ref 30–200)
CO2 SERPL-SCNC: 21 MMOL/L — LOW (ref 22–31)
CO2 SERPL-SCNC: 22 MMOL/L — SIGNIFICANT CHANGE UP (ref 22–31)
COLOR SPEC: YELLOW — SIGNIFICANT CHANGE UP
CREAT SERPL-MCNC: 0.99 MG/DL — SIGNIFICANT CHANGE UP (ref 0.5–1.3)
CREAT SERPL-MCNC: 1 MG/DL — SIGNIFICANT CHANGE UP (ref 0.5–1.3)
CRP SERPL HS-MCNC: 3.34 MG/L — HIGH (ref 0–3)
DIFF PNL FLD: NEGATIVE — SIGNIFICANT CHANGE UP
EGFR: 92 ML/MIN/1.73M2 — SIGNIFICANT CHANGE UP
EGFR: 93 ML/MIN/1.73M2 — SIGNIFICANT CHANGE UP
EOSINOPHIL # BLD AUTO: 0.23 K/UL — SIGNIFICANT CHANGE UP (ref 0–0.5)
EOSINOPHIL NFR BLD AUTO: 2.4 % — SIGNIFICANT CHANGE UP (ref 0–6)
ESTIMATED AVERAGE GLUCOSE: 114 MG/DL — SIGNIFICANT CHANGE UP (ref 68–114)
ESTIMATED AVERAGE GLUCOSE: 117 MG/DL — HIGH (ref 68–114)
GLUCOSE SERPL-MCNC: 146 MG/DL — HIGH (ref 70–99)
GLUCOSE SERPL-MCNC: 97 MG/DL — SIGNIFICANT CHANGE UP (ref 70–99)
GLUCOSE UR QL: NEGATIVE MG/DL — SIGNIFICANT CHANGE UP
HCT VFR BLD CALC: 40.6 % — SIGNIFICANT CHANGE UP (ref 39–50)
HCT VFR BLD CALC: 41.2 % — SIGNIFICANT CHANGE UP (ref 39–50)
HCT VFR BLD CALC: 43.8 % — SIGNIFICANT CHANGE UP (ref 39–50)
HDLC SERPL-MCNC: 21 MG/DL — LOW
HDLC SERPL-MCNC: 26 MG/DL — LOW
HGB BLD-MCNC: 14.2 G/DL — SIGNIFICANT CHANGE UP (ref 13–17)
HGB BLD-MCNC: 14.2 G/DL — SIGNIFICANT CHANGE UP (ref 13–17)
HGB BLD-MCNC: 14.9 G/DL — SIGNIFICANT CHANGE UP (ref 13–17)
IMM GRANULOCYTES NFR BLD AUTO: 1 % — HIGH (ref 0–0.9)
INR BLD: 0.91 — SIGNIFICANT CHANGE UP (ref 0.85–1.16)
KETONES UR-MCNC: NEGATIVE MG/DL — SIGNIFICANT CHANGE UP
LEUKOCYTE ESTERASE UR-ACNC: ABNORMAL
LIPID PNL WITH DIRECT LDL SERPL: 134 MG/DL — HIGH
LIPID PNL WITH DIRECT LDL SERPL: SIGNIFICANT CHANGE UP MG/DL
LYMPHOCYTES # BLD AUTO: 3.01 K/UL — SIGNIFICANT CHANGE UP (ref 1–3.3)
LYMPHOCYTES # BLD AUTO: 31.9 % — SIGNIFICANT CHANGE UP (ref 13–44)
MAGNESIUM SERPL-MCNC: 2.4 MG/DL — SIGNIFICANT CHANGE UP (ref 1.6–2.6)
MCHC RBC-ENTMCNC: 30.5 PG — SIGNIFICANT CHANGE UP (ref 27–34)
MCHC RBC-ENTMCNC: 30.9 PG — SIGNIFICANT CHANGE UP (ref 27–34)
MCHC RBC-ENTMCNC: 31.1 PG — SIGNIFICANT CHANGE UP (ref 27–34)
MCHC RBC-ENTMCNC: 34 G/DL — SIGNIFICANT CHANGE UP (ref 32–36)
MCHC RBC-ENTMCNC: 34.5 G/DL — SIGNIFICANT CHANGE UP (ref 32–36)
MCHC RBC-ENTMCNC: 35 G/DL — SIGNIFICANT CHANGE UP (ref 32–36)
MCV RBC AUTO: 88.8 FL — SIGNIFICANT CHANGE UP (ref 80–100)
MCV RBC AUTO: 89.6 FL — SIGNIFICANT CHANGE UP (ref 80–100)
MCV RBC AUTO: 89.8 FL — SIGNIFICANT CHANGE UP (ref 80–100)
MONOCYTES # BLD AUTO: 0.69 K/UL — SIGNIFICANT CHANGE UP (ref 0–0.9)
MONOCYTES NFR BLD AUTO: 7.3 % — SIGNIFICANT CHANGE UP (ref 2–14)
NEUTROPHILS # BLD AUTO: 5.37 K/UL — SIGNIFICANT CHANGE UP (ref 1.8–7.4)
NEUTROPHILS NFR BLD AUTO: 56.9 % — SIGNIFICANT CHANGE UP (ref 43–77)
NITRITE UR-MCNC: NEGATIVE — SIGNIFICANT CHANGE UP
NON HDL CHOLESTEROL: 241 MG/DL — HIGH
NON HDL CHOLESTEROL: 252 MG/DL — HIGH
NRBC # BLD: 0 /100 WBCS — SIGNIFICANT CHANGE UP (ref 0–0)
NT-PROBNP SERPL-SCNC: 254 PG/ML — SIGNIFICANT CHANGE UP (ref 0–300)
PA ADP PRP-ACNC: 47 PRU — LOW (ref 182–335)
PH UR: 7 — SIGNIFICANT CHANGE UP (ref 5–8)
PLATELET # BLD AUTO: 280 K/UL — SIGNIFICANT CHANGE UP (ref 150–400)
PLATELET # BLD AUTO: 282 K/UL — SIGNIFICANT CHANGE UP (ref 150–400)
PLATELET # BLD AUTO: 282 K/UL — SIGNIFICANT CHANGE UP (ref 150–400)
POTASSIUM SERPL-MCNC: 3.7 MMOL/L — SIGNIFICANT CHANGE UP (ref 3.5–5.3)
POTASSIUM SERPL-MCNC: 4.3 MMOL/L — SIGNIFICANT CHANGE UP (ref 3.5–5.3)
POTASSIUM SERPL-SCNC: 3.7 MMOL/L — SIGNIFICANT CHANGE UP (ref 3.5–5.3)
POTASSIUM SERPL-SCNC: 4.3 MMOL/L — SIGNIFICANT CHANGE UP (ref 3.5–5.3)
PROT SERPL-MCNC: 6.5 G/DL — SIGNIFICANT CHANGE UP (ref 6–8.3)
PROT SERPL-MCNC: 7.5 G/DL — SIGNIFICANT CHANGE UP (ref 6–8.3)
PROT UR-MCNC: NEGATIVE MG/DL — SIGNIFICANT CHANGE UP
PROTHROM AB SERPL-ACNC: 10.7 SEC — SIGNIFICANT CHANGE UP (ref 9.9–13.4)
RBC # BLD: 4.57 M/UL — SIGNIFICANT CHANGE UP (ref 4.2–5.8)
RBC # BLD: 4.6 M/UL — SIGNIFICANT CHANGE UP (ref 4.2–5.8)
RBC # BLD: 4.88 M/UL — SIGNIFICANT CHANGE UP (ref 4.2–5.8)
RBC # FLD: 13.2 % — SIGNIFICANT CHANGE UP (ref 10.3–14.5)
RBC # FLD: 13.3 % — SIGNIFICANT CHANGE UP (ref 10.3–14.5)
RBC # FLD: 13.3 % — SIGNIFICANT CHANGE UP (ref 10.3–14.5)
RH IG SCN BLD-IMP: POSITIVE — SIGNIFICANT CHANGE UP
SODIUM SERPL-SCNC: 136 MMOL/L — SIGNIFICANT CHANGE UP (ref 135–145)
SODIUM SERPL-SCNC: 141 MMOL/L — SIGNIFICANT CHANGE UP (ref 135–145)
SP GR SPEC: 1.02 — SIGNIFICANT CHANGE UP (ref 1–1.03)
TRIGL SERPL-MCNC: 631 MG/DL — HIGH
TRIGL SERPL-MCNC: 896 MG/DL — HIGH
TROPONIN I, HIGH SENSITIVITY RESULT: 698.5 NG/L — HIGH
TROPONIN T, HIGH SENSITIVITY RESULT: 97 NG/L — CRITICAL HIGH (ref 0–51)
TSH SERPL-MCNC: 5.6 UIU/ML — HIGH (ref 0.27–4.2)
TSH SERPL-MCNC: 6.92 UIU/ML — HIGH (ref 0.36–3.74)
UROBILINOGEN FLD QL: 0.2 MG/DL — SIGNIFICANT CHANGE UP (ref 0.2–1)
WBC # BLD: 10.53 K/UL — HIGH (ref 3.8–10.5)
WBC # BLD: 10.59 K/UL — HIGH (ref 3.8–10.5)
WBC # BLD: 9.44 K/UL — SIGNIFICANT CHANGE UP (ref 3.8–10.5)
WBC # FLD AUTO: 10.53 K/UL — HIGH (ref 3.8–10.5)
WBC # FLD AUTO: 10.59 K/UL — HIGH (ref 3.8–10.5)
WBC # FLD AUTO: 9.44 K/UL — SIGNIFICANT CHANGE UP (ref 3.8–10.5)

## 2024-11-18 PROCEDURE — 86850 RBC ANTIBODY SCREEN: CPT

## 2024-11-18 PROCEDURE — 96374 THER/PROPH/DIAG INJ IV PUSH: CPT

## 2024-11-18 PROCEDURE — 99152 MOD SED SAME PHYS/QHP 5/>YRS: CPT

## 2024-11-18 PROCEDURE — 93306 TTE W/DOPPLER COMPLETE: CPT

## 2024-11-18 PROCEDURE — 71045 X-RAY EXAM CHEST 1 VIEW: CPT | Mod: 26

## 2024-11-18 PROCEDURE — 83036 HEMOGLOBIN GLYCOSYLATED A1C: CPT

## 2024-11-18 PROCEDURE — 93458 L HRT ARTERY/VENTRICLE ANGIO: CPT | Mod: 26

## 2024-11-18 PROCEDURE — 84443 ASSAY THYROID STIM HORMONE: CPT

## 2024-11-18 PROCEDURE — 74174 CTA ABD&PLVS W/CONTRAST: CPT | Mod: MC

## 2024-11-18 PROCEDURE — 80053 COMPREHEN METABOLIC PANEL: CPT

## 2024-11-18 PROCEDURE — 85730 THROMBOPLASTIN TIME PARTIAL: CPT

## 2024-11-18 PROCEDURE — C1887: CPT

## 2024-11-18 PROCEDURE — 99233 SBSQ HOSP IP/OBS HIGH 50: CPT

## 2024-11-18 PROCEDURE — 85025 COMPLETE CBC W/AUTO DIFF WBC: CPT

## 2024-11-18 PROCEDURE — 71275 CT ANGIOGRAPHY CHEST: CPT | Mod: MC

## 2024-11-18 PROCEDURE — C1894: CPT

## 2024-11-18 PROCEDURE — 85610 PROTHROMBIN TIME: CPT

## 2024-11-18 PROCEDURE — 86900 BLOOD TYPING SEROLOGIC ABO: CPT

## 2024-11-18 PROCEDURE — 36415 COLL VENOUS BLD VENIPUNCTURE: CPT

## 2024-11-18 PROCEDURE — 96375 TX/PRO/DX INJ NEW DRUG ADDON: CPT

## 2024-11-18 PROCEDURE — 93010 ELECTROCARDIOGRAM REPORT: CPT

## 2024-11-18 PROCEDURE — 86141 C-REACTIVE PROTEIN HS: CPT

## 2024-11-18 PROCEDURE — 86901 BLOOD TYPING SEROLOGIC RH(D): CPT

## 2024-11-18 PROCEDURE — 83735 ASSAY OF MAGNESIUM: CPT

## 2024-11-18 PROCEDURE — 93005 ELECTROCARDIOGRAM TRACING: CPT

## 2024-11-18 PROCEDURE — 93458 L HRT ARTERY/VENTRICLE ANGIO: CPT

## 2024-11-18 PROCEDURE — C1769: CPT

## 2024-11-18 PROCEDURE — 83880 ASSAY OF NATRIURETIC PEPTIDE: CPT

## 2024-11-18 PROCEDURE — 84484 ASSAY OF TROPONIN QUANT: CPT

## 2024-11-18 PROCEDURE — 99285 EMERGENCY DEPT VISIT HI MDM: CPT | Mod: 25

## 2024-11-18 PROCEDURE — 80061 LIPID PANEL: CPT

## 2024-11-18 PROCEDURE — 85027 COMPLETE CBC AUTOMATED: CPT

## 2024-11-18 PROCEDURE — 93880 EXTRACRANIAL BILAT STUDY: CPT | Mod: 26

## 2024-11-18 RX ORDER — NICOTINE 21 MG/24H
1 PATCH, EXTENDED RELEASE TRANSDERMAL DAILY
Refills: 0 | Status: DISCONTINUED | OUTPATIENT
Start: 2024-11-18 | End: 2024-11-18

## 2024-11-18 RX ORDER — SODIUM CHLORIDE 9 MG/ML
250 INJECTION, SOLUTION INTRAMUSCULAR; INTRAVENOUS; SUBCUTANEOUS ONCE
Refills: 0 | Status: DISCONTINUED | OUTPATIENT
Start: 2024-11-18 | End: 2024-11-18

## 2024-11-18 RX ORDER — ISOSORBIDE MONONITRATE 10 MG
30 TABLET ORAL DAILY
Refills: 0 | Status: DISCONTINUED | OUTPATIENT
Start: 2024-11-19 | End: 2024-11-22

## 2024-11-18 RX ORDER — METOPROLOL TARTRATE 100 MG/1
1 TABLET, FILM COATED ORAL
Qty: 0 | Refills: 0 | DISCHARGE
Start: 2024-11-18

## 2024-11-18 RX ORDER — ACETAMINOPHEN 500MG 500 MG/1
650 TABLET, COATED ORAL EVERY 6 HOURS
Refills: 0 | Status: DISCONTINUED | OUTPATIENT
Start: 2024-11-18 | End: 2024-11-22

## 2024-11-18 RX ORDER — ALPRAZOLAM 0.5 MG
1 TABLET ORAL
Qty: 0 | Refills: 0 | DISCHARGE
Start: 2024-11-18

## 2024-11-18 RX ORDER — NITROGLYCERIN 0.4MG/HR
0.4 PATCH, TRANSDERMAL 24 HOURS TRANSDERMAL
Refills: 0 | Status: DISCONTINUED | OUTPATIENT
Start: 2024-11-18 | End: 2024-11-22

## 2024-11-18 RX ORDER — SENNOSIDES 8.6 MG
2 TABLET ORAL AT BEDTIME
Refills: 0 | Status: DISCONTINUED | OUTPATIENT
Start: 2024-11-18 | End: 2024-11-22

## 2024-11-18 RX ORDER — METOPROLOL TARTRATE 100 MG/1
25 TABLET, FILM COATED ORAL EVERY 12 HOURS
Refills: 0 | Status: DISCONTINUED | OUTPATIENT
Start: 2024-11-18 | End: 2024-11-18

## 2024-11-18 RX ORDER — HEPARIN SODIUM,PORCINE 1000/ML
6000 VIAL (ML) INJECTION EVERY 6 HOURS
Refills: 0 | Status: DISCONTINUED | OUTPATIENT
Start: 2024-11-18 | End: 2024-11-18

## 2024-11-18 RX ORDER — SODIUM CHLORIDE 9 MG/ML
3 INJECTION, SOLUTION INTRAMUSCULAR; INTRAVENOUS; SUBCUTANEOUS EVERY 8 HOURS
Refills: 0 | Status: DISCONTINUED | OUTPATIENT
Start: 2024-11-18 | End: 2024-11-19

## 2024-11-18 RX ORDER — PANTOPRAZOLE SODIUM 40 MG/1
1 TABLET, DELAYED RELEASE ORAL
Qty: 0 | Refills: 0 | DISCHARGE
Start: 2024-11-18

## 2024-11-18 RX ORDER — HEPARIN SODIUM,PORCINE 1000/ML
VIAL (ML) INJECTION
Qty: 25000 | Refills: 0 | Status: DISCONTINUED | OUTPATIENT
Start: 2024-11-18 | End: 2024-11-18

## 2024-11-18 RX ORDER — POLYETHYLENE GLYCOL 3350 17 G/17G
17 POWDER, FOR SOLUTION ORAL DAILY
Refills: 0 | Status: DISCONTINUED | OUTPATIENT
Start: 2024-11-18 | End: 2024-11-22

## 2024-11-18 RX ORDER — PANTOPRAZOLE SODIUM 40 MG/1
40 TABLET, DELAYED RELEASE ORAL
Refills: 0 | Status: DISCONTINUED | OUTPATIENT
Start: 2024-11-18 | End: 2024-11-22

## 2024-11-18 RX ORDER — HEPARIN SODIUM,PORCINE 1000/ML
0 VIAL (ML) INJECTION
Qty: 0 | Refills: 0 | DISCHARGE
Start: 2024-11-18

## 2024-11-18 RX ORDER — SODIUM CHLORIDE 9 MG/ML
1000 INJECTION, SOLUTION INTRAMUSCULAR; INTRAVENOUS; SUBCUTANEOUS
Refills: 0 | Status: DISCONTINUED | OUTPATIENT
Start: 2024-11-18 | End: 2024-11-18

## 2024-11-18 RX ORDER — HEPARIN SODIUM,PORCINE 1000/ML
900 VIAL (ML) INJECTION
Qty: 25000 | Refills: 0 | Status: DISCONTINUED | OUTPATIENT
Start: 2024-11-18 | End: 2024-11-20

## 2024-11-18 RX ADMIN — Medication 1000 UNIT(S)/HR: at 02:28

## 2024-11-18 RX ADMIN — SODIUM CHLORIDE 3 MILLILITER(S): 9 INJECTION, SOLUTION INTRAMUSCULAR; INTRAVENOUS; SUBCUTANEOUS at 21:39

## 2024-11-18 RX ADMIN — Medication 1000 UNIT(S)/HR: at 07:09

## 2024-11-18 RX ADMIN — Medication 80 MILLIGRAM(S): at 21:41

## 2024-11-18 RX ADMIN — Medication 1000 UNIT(S)/HR: at 01:34

## 2024-11-18 NOTE — H&P ADULT - NSHPSOCIALHISTORY_GEN_ALL_CORE
Social History  Smoker:   Current Smoker :  ETOH Use:   Social   [place] Drug Use:  No Social History  Smoker:   Current Smoker : 1 ppd since he was 15yrs  ETOH Use:   Social   [place] Drug Use:  No, reports he is prescribed percocet for back pain but only takes twice a month

## 2024-11-18 NOTE — PROGRESS NOTE ADULT - SUBJECTIVE AND OBJECTIVE BOX
CTICU  CRITICAL  CARE  attending     Hand off received 					   Pertinent clinical, laboratory, radiographic, hemodynamic, echocardiographic, respiratory data, microbiologic data and chart were reviewed and analyzed frequently throughout the course of the day and night  Patient seen and examined with CTS/ SH attending at bedside  Pt is a 49y , Male, HEALTH ISSUES - PROBLEM Dx:      , FAMILY HISTORY:  PAST MEDICAL & SURGICAL HISTORY:  No pertinent past medical history      Back pain        Patient is a 49y old  Male who presents with a chief complaint of CAD (18 Nov 2024 17:32)      14 system review limited by mentation and multiorgan morbidity     Vital signs, hemodynamic and respiratory parameters were reviewed from the bedside nursing flowsheet.  ICU Vital Signs Last 24 Hrs  T(C): 36.3 (18 Nov 2024 20:52), Max: 37.1 (18 Nov 2024 01:48)  T(F): 97.3 (18 Nov 2024 20:52), Max: 98.7 (18 Nov 2024 01:48)  HR: 65 (18 Nov 2024 21:00) (59 - 87)  BP: 137/74 (18 Nov 2024 21:00) (110/62 - 155/70)  BP(mean): 97 (18 Nov 2024 21:00) (97 - 109)  ABP: --  ABP(mean): --  RR: 16 (18 Nov 2024 21:00) (12 - 20)  SpO2: 98% (18 Nov 2024 21:00) (94% - 100%)    O2 Parameters below as of 18 Nov 2024 21:00  Patient On (Oxygen Delivery Method): room air          Adult Advanced Hemodynamics Last 24 Hrs  CVP(mm Hg): --  CVP(cm H2O): --  CO: --  CI: --  PA: --  PA(mean): --  PCWP: --  SVR: --  SVRI: --  PVR: --  PVRI: --,     Intake and output was reviewed and the fluid balance was calculated  Daily Height in cm: 175.26 (18 Nov 2024 19:00)    Daily   I&O's Summary    18 Nov 2024 07:01  -  18 Nov 2024 22:44  --------------------------------------------------------  IN: 9 mL / OUT: 0 mL / NET: 9 mL        All lines and drain sites were assessed  Glycemic trend was reviewedCAPILLARY BLOOD GLUCOSE        No acute change in focality  Auscultation of the chest reveals equal bs  Abdomen is soft  Extremities are warm and well perfused  Wounds appear clean and unremarkable  Antibiotics are periop    labs  CBC Full  -  ( 18 Nov 2024 18:52 )  WBC Count : 9.44 K/uL  RBC Count : 4.88 M/uL  Hemoglobin : 14.9 g/dL  Hematocrit : 43.8 %  Platelet Count - Automated : 280 K/uL  Mean Cell Volume : 89.8 fl  Mean Cell Hemoglobin : 30.5 pg  Mean Cell Hemoglobin Concentration : 34.0 g/dL  Auto Neutrophil # : 5.37 K/uL  Auto Lymphocyte # : 3.01 K/uL  Auto Monocyte # : 0.69 K/uL  Auto Eosinophil # : 0.23 K/uL  Auto Basophil # : 0.05 K/uL  Auto Neutrophil % : 56.9 %  Auto Lymphocyte % : 31.9 %  Auto Monocyte % : 7.3 %  Auto Eosinophil % : 2.4 %  Auto Basophil % : 0.5 %    11-18    136  |  104  |  14  ----------------------------<  97  4.3   |  21[L]  |  0.99    Ca    8.9      18 Nov 2024 18:52  Mg     2.4     11-18    TPro  7.5  /  Alb  4.3  /  TBili  0.2  /  DBili  x   /  AST  21  /  ALT  28  /  AlkPhos  80  11-18    PT/INR - ( 18 Nov 2024 18:52 )   PT: 10.7 sec;   INR: 0.91          PTT - ( 18 Nov 2024 18:52 )  PTT:37.7 sec  The current medications were reviewed   MEDICATIONS  (STANDING):  aspirin  chewable 81 milliGRAM(s) Oral daily  atorvastatin 80 milliGRAM(s) Oral at bedtime  heparin  Infusion 900 Unit(s)/Hr (9 mL/Hr) IV Continuous <Continuous>  pantoprazole    Tablet 40 milliGRAM(s) Oral before breakfast  polyethylene glycol 3350 17 Gram(s) Oral daily  senna 2 Tablet(s) Oral at bedtime  sodium chloride 0.9% lock flush 3 milliLiter(s) IV Push every 8 hours    MEDICATIONS  (PRN):  acetaminophen     Tablet .. 650 milliGRAM(s) Oral every 6 hours PRN Mild Pain (1 - 3)  nitroglycerin     SubLingual 0.4 milliGRAM(s) SubLingual every 5 minutes PRN Chest Pain       PROBLEM LIST/ ASSESSMENT:  HEALTH ISSUES - PROBLEM Dx:      ,   Patient is a 49y old  Male who presents with a chief complaint of CAD (18 Nov 2024 17:32)     preop for cardiac surgery    nstemi                My plan includes :    ntg if angina    heparin gtt    betablockade to keep hr in g60s  close hemodynamic, ventilatory and drain monitoring and management per post op routine    Monitor for arrhythmias and monitor parameters for organ perfusion  beta blockade not administered due to hemodynamic instability and bradycardia  monitor neurologic status  Head of the bed should remain elevated to 45 deg .   chest PT and IS will be encouraged  monitor adequacy of oxygenation and ventilation and attempt to wean oxygen  antibiotic regimen will be tailored to the clinical, laboratory and microbiologic data  Nutritional goals will be met using po eventually , ensure adequate caloric intake and montior the same  Stress ulcer and VTE prophylaxis will be achieved    Glycemic control is satisfactory  Electrolytes have been repleted as necessary and wound care has been carried out. Pain control has been achieved.   agressive physical therapy and early mobility and ambulation goals will be met   The family was updated about the course and plan  CRITICAL CARE TIME personally provided by me  in evaluation and management, reassessments, review and interpretation of labs and x-rays, ventilator and hemodynamic management, formulating a plan and coordinating care: __110___ MIN.  Time does not include procedural time. Time spent was non routine post-operarive caRE and included multiple and repeated evaluations at the bedside  CTICU ATTENDING     					    Bruno Peterson MD

## 2024-11-18 NOTE — DISCHARGE NOTE PROVIDER - NSDCCAREPROVSEEN_GEN_ALL_CORE_FT
Darya, Deangelo Santiago, Ronnie Vazquez, Carissa Muñoz, Ana Luisa Cruz, Mohsen Patel, Trevor Espana, Adán Mcduffie, Yessenia Dillard, Mary Payan, Guillermina

## 2024-11-18 NOTE — H&P ADULT - ASSESSMENT
49 year old male with PMH no significant PMH except current 1 PPD x 30 yrs tobacco use. Pt reports CP while doing nothing yesterday which radiated to his back and shoulders. He reports prior episodes but never this severe. He called 911 who gave him aspirin enroute to hospital. ED gave brilinta 180, CTA without PE/dissection. Given plavix 300 last night and started on heparin infusion.plan for C  Admitted  to telemetry unit for monitoring , send 3 sets of cardiac enzymes to rule out acute coronary event, obtain ECHO to evaluate LVEF, cardiology consult  ,continue current management, O2 supply, anticoagulation plan as per cardiology consult Seen by cardiologist Dr Santiago in a past - Patient has  no chest pain at time of card cons  . Patient never has this kind of chest pain in the past . Patient has very active life style and can walk 1 mile and even more prior to this episode .. Patient has elevated Troponin I levels and trending up . Patient has ACS , NON-ST CELIA . Continue aspirin , I/V heparin drip , Plavix and metoprolol and atorvastatin and nitrates as ordered . . Patient cardiac wise stable and cleared for cardiac cath . Patient understands cardiac cath procedures and agrees with that . Cleared by cardiologist and medically optimized for procedure .  maintain NPO since MN status  ECG and labs reviewed  aspirin and brilinta loaded  continue aspirin  ticagrelor vs clopidogrel deferred pending diagnostic C outcome/findings   continue heparin infusion  continue statin  BB per non-interventional cardiology   procedure discussed with patient; risks and benefits explained, questions answered  consent to be obtained by attending IC  pre cath IVF ordered to prevent TRAVIS

## 2024-11-18 NOTE — DISCHARGE NOTE PROVIDER - NSDCCPCAREPLAN_GEN_ALL_CORE_FT
PRINCIPAL DISCHARGE DIAGNOSIS  Diagnosis: Non-ST elevation MI (NSTEMI)  Assessment and Plan of Treatment:       SECONDARY DISCHARGE DIAGNOSES  Diagnosis: ACS (acute coronary syndrome)  Assessment and Plan of Treatment:     Diagnosis: Smoking  Assessment and Plan of Treatment:

## 2024-11-18 NOTE — DISCHARGE NOTE PROVIDER - NSDCQMACEAOTHER_CARD_A_CORE_FT
Transferred to tertiary facility for cardiac intervention and final medication regimen optimization will be assessed

## 2024-11-18 NOTE — DISCHARGE NOTE PROVIDER - NSDCFUADDINST_GEN_ALL_CORE_FT
Wound Care:   the day AFTER your procedure...     Remove the bandage from the site and gently clean with soap and water then pat dry; leave open to air.     You may take a brief shower     Do NOT apply lotions, creams, powders, ointments, or perfumes to your incision site unless prescribed by your physician     Do NOT soak your procedure site for 1 week (no baths, no pools, no tubs, etc...)     Check  your groin and /or wrist daily. A small amount of bruising, and soreness are normal    ACTIVITY: for 24 hours      - DO NOT DRIVE     - DO NOT make any important decisions or sign legal documents      - DO NOT operate heavy machinery      - you may resume sexual activity in 48 hours, unless otherwise instructed by your cardiologist          If your procedure was done through the WRIST: for the NEXT 3 DAYS:          - avoid pushing, pulling, with that affected wrist (such as pushing up from a seated position)          - avoid repeated movement of that hand and wrist (such as typing or hammering)          - DO NOT LIFT anything more than 5 pounds         If your procedure was done through the GROIN: for the NEXT 5 DAYS          - Limit climbing stairs, DO NOT soak in bathtub or pool          - no strenuous activities, pushing, pulling, straining          - Do not lift anything more than 10 pounds     MEDICATION:      Please take your medications as explained to you (found on your discharge paperwork)      DO NOT STOP taking them without consulting with your cardiologist first.      **if you have diabetes and take metformin please do not take this medication for 2 days after the procedure. Restart and take as usual starting day 3.    Follow the heart healthy diet recommended by your doctor.   Drink plenty of water for the next 24 hours unless otherwise instructed.  Do not drink any alcoholic beverages for 24 hours (beer, wine, liquor, etc).    If you smoke: STOP SMOKING. Call the [facility] at [phone] for assistance.    CALL your cardiologist/primary care doctor to make a follow-up appointment in 2 WEEKS     **CALL YOUR DOCTOR if you experience     fever, chills, body aches, or severe pain, swelling, redness, heat or yellow discharge at incision site     bleeding or excruciating pain at the procedural site, swelling (golf ball size) at your procedural site     CHEST PAIN     numbness, tingling, temperature change (of your procedural site)     Pain  -you may have pain after your surgery or procedure at the puncture site or in the artery/vein that has been treated.  -take pain medication as directed by your doctor.  call your doctor if your pain is not getting better within 5 days or if it gets worse  -prescription pain medication should be taken with food, and can cause constipation, an over-the-counter softener may be helpful    Nausea  -anesthesia/sedation can upset your stomach  -eat bland foods (Jell-o, crackers, toast) and drink ginger ale if you are nauseated  -drink plenty of fluids such as water or ginger ale (unless instructed otherwise by your doctor)  -if you have nausea or vomiting the day after your procedure, call your doctor    Bleeding  -you may have a small amount of oozing from your surgical or procedural site  -bleeding as the site can be dangerous and should prompt immediate medical attention    Infection  -if you have any of the following signs of infection, call your doctor:       redness, swelling, fever over 101 degrees, thick yellow/white drainage    If you are unable to reach your doctor, you may contact:   Dr Shyann Morales @ [phone]    **Call 911 immediately if:     - your hand or leg becomes blue, feels cold to touch, or if you have numbness or tingling     - bleeding or swelling from your wrist or groin site cannot be controlled or if area becomes very red or hot to touch     - you have pain, pressure, tightness or burning in your chest, arms, jaw or stomach; shortness of breath; nausea or excessive sweating; lightheadedness; dizziness or a fainting spell; or if you have sudden back or stomach pain     -you have rapid heartbeat or palpitations     - you have bright red blood in large amounts, severe pain at access site (wrist or groin) or significant new swelling at the puncture site    If/because you had anesthesia, for the next 24 hours you should NOT:  -drive a car, operate power tool or machinery  -drink alcohol, beer, or wine  -make important personal or business decisions  If you had any type of sedation, you may experience lightheadedness, dizziness, or sleepiness following your procedure. A responsible adult should stay with you for at least 24 hours following your procedure.

## 2024-11-18 NOTE — DISCHARGE NOTE PROVIDER - HOSPITAL COURSE
49 year old male with PMH no significant PMH except current 1 PPD x 30 yrs tobacco use. Pt reports CP while doing nothing yesterday which radiated to his back and shoulders. He reports prior episodes but never this severe. He called 911 who gave him aspirin enroute to hospital. ED gave brilinta 180, CTA without PE/dissection. Given plavix 300 last night and started on heparin infusion.plan for C  Admitted  to telemetry unit for monitoring , send 3 sets of cardiac enzymes to rule out acute coronary event, obtain ECHO to evaluate LVEF, cardiology consult  ,continue current management, O2 supply, anticoagulation plan as per cardiology consult Seen by cardiologist Dr Santiago in a past - Patient has  no chest pain at time of card cons  . Patient never has this kind of chest pain in the past . Patient has very active life style and can walk 1 mile and even more prior to this episode .. Patient has elevated Troponin I levels and trending up . Patient has ACS , NON-ST CELIA . Continue aspirin , I/V heparin drip , Plavix and metoprolol and atorvastatin and nitrates as ordered . . Patient cardiac wise stable and cleared for cardiac cath . Patient understands cardiac cath procedures and agrees with that . Cleared by cardiologist and medically optimized for procedure .  maintain NPO since MN status  ECG and labs reviewed  aspirin and brilinta loaded  continue aspirin  ticagrelor vs clopidogrel deferred pending diagnostic LHC outcome/findings   continue heparin infusion  continue statin  BB per non-interventional cardiology   procedure discussed with patient; risks and benefits explained, questions answered  consent to be obtained by attending IC  pre cath IVF ordered to prevent TRAVIS      Problem/Plan - 1:  ·  Problem: ACS (acute coronary syndrome).     Problem/Plan - 2:  ·  Problem: NSTEMI (non-ST elevation myocardial infarction).     Problem/Plan - 3:  ·  Problem: Smoking.     Problem/Plan - 4:  ·  Problem: Prophylactic measure.

## 2024-11-18 NOTE — H&P ADULT - NSICDXPASTMEDICALHX_GEN_ALL_CORE_FT
PAST MEDICAL HISTORY:  No pertinent past medical history      PAST MEDICAL HISTORY:  Back pain     No pertinent past medical history

## 2024-11-18 NOTE — H&P ADULT - NSHPREVIEWOFSYSTEMS_GEN_ALL_CORE
Review of Systems  CONSTITUTIONAL:  Denies Fevers / chills, sweats, fatigue, weight loss, weight gain                                      NEURO:  Denies parethesias, seizures, syncope, confusion                                                                                EYES:  Denies Blurry vision, discharge, pain, loss of vision                                                                                    ENMT:  Denies Difficulty hearing, vertigo, dysphagia, epistaxis, recent dental work                                       CV:  Denies Chest pain, palpitations, JETT, orthopnea                                                                                          RESPIRATORY:  Denies Wheezing, SOB, cough / sputum, hemoptysis                                                                GI:  Denies Nausea, vomiting, diarrhea, constipation, melena, difficulty swallowing                                               : Denies Hematuria, dysuria, urgency, incontinence                                                                                         MUSCULOSKELETAL:  Denies arthritis, joint swelling, muscle weakness                                                             SKIN/BREAST:  Denies rash, itching, hair loss, masses                                                                                            PSYCH:  Denies depression, anxiety, suicidal ideation                                                                                               HEME/LYMPH:  Denies bruises easily, enlarged lymph nodes, tender lymph nodes                                        ENDOCRINE:  Denies cold intolerance, heat intolerance, polydipsia

## 2024-11-18 NOTE — H&P ADULT - NSHPLABSRESULTS_GEN_ALL_CORE
< from: CT Angio Abdomen and Pelvis w/ IV Cont (11.17.24 @ 16:42) >    CHEST:  LUNGS AND LARGE AIRWAYS: Central airways are patent. No large focal   consolidation. Peripheral reticulation and scarring of the imaged thorax.   Sub-4 mm pulmonary nodules.  PLEURA: No pleural effusion or pneumothorax.  VESSELS: No intramural hematoma, aneurysm, or acute dissection of the   thoracic aorta within the limitations of this non-ECG gated study. Aortic   root and mid ascending segment are suboptimally assessed due to lack of   ECG gating. Scattered aortic and great vessel noncalcified plaque is   noted. Right subclavian artery is suboptimally assessed due to streak   artifact from adjacent contrast bolus. Left vertebral artery arises from   the aortic arch, normal variant.  HEART: Heart size is qualitatively within normal limits. Multivessel   coronary artery calcifications. Trace pericardial fluid.  MEDIASTINUM AND TROY: No enlarged lymph nodes of the thorax. Esophagus is   nondistended.  CHEST WALL AND LOWER NECK: No chest wall hematoma. Visualized thyroid is   unremarkable.    ABDOMEN AND PELVIS:  LIVER: Enlarged, 20 cm in craniocaudal dimension. Diffuse steatosis and   morphologic features of early cirrhosis. Right hepatic lobe 1 cm   hypervascular focus on image 97 series 204 which is indeterminate.   Contrast-enhanced abdominal MRI is recommended for characterization.  BILE DUCTS: No distention  GALLBLADDER: Unremarkable CT appearance  SPLEEN: Spleen size within normal limits  PANCREAS: No acute peripancreatic inflammation  ADRENALS: Unremarkable  KIDNEYS/URETERS: No hydronephrosis or obstructing ureteral calculus    BLADDER: Minimally distended.  REPRODUCTIVE ORGANS: Enlarged prostate    BOWEL: Stomach is underdistended. No small bowel distention. Appendix is   not obstructed. Mild stool burden of the colon limits evaluation of the   colonic mucosa. Submucosal fat deposition in the rectum may reflect   chronic inflammation.  PERITONEUM/RETROPERITONEUM: No ascites  VESSELS: No acute aortoiliac dissection. Ectatic infrarenal abdominal   aorta measures 2.8 x 2.6 cm. Multifocal aortoiliac plaque is noted   including small focus of pedunculated plaque in infrarenal aorta on image   144 series 304. This may be high risk for distal embolization. Mild   category disease of the bilateral common and external iliac arteries.   Moderate to severe stenosis of right internal iliac artery origin.   Borderline mild to moderate stenosis of left internal iliac artery   origin. Visceral arteries demonstrate multifocal minimal to mild category   plaque.  LYMPH NODES: No enlarged lymph nodes by CT size criteria  ABDOMINAL WALL: Tiny fat-containing umbilical hernia  BONES: Degenerative changes of the bones including multilevel spinal   spondylosis. Moderate disc height loss at L3-L4 disc level. Multilevel   lumbar disc bulges. Central canal and neural foramina are not adequately   assessed on this study.    IMPRESSION:    No acute aortic dissection within the limitations of this non-ECG gated   study. Ectasia of the infrarenal abdominal aorta, measuring 2.8 cm, with   small focus of pedunculated plaque. Additional multifocal aortoiliac   plaque as described above.    Multivessel coronary artery calcifications. Recommend cardiology   evaluation.    Enlarged liver with diffuse steatosis and morphologic features of early   cirrhosis. Right hepatic lobe 1 cm hypervascular focus on image 97 series   204 which is indeterminate. Contrast-enhanced abdominal MRI is   recommended for characterization.    Sub-4 mm pulmonary nodules can be reevaluated with subsequent chest CT in   12 months, or aspiration risk factors.    < end of copied text >

## 2024-11-18 NOTE — DISCHARGE NOTE PROVIDER - NSDCMRMEDTOKEN_GEN_ALL_CORE_FT
ALPRAZolam 0.25 mg oral tablet: 1 tab(s) orally every 8 hours As needed anxiety  aspirin 81 mg oral delayed release tablet: 1 tab(s) orally once a day  atorvastatin 80 mg oral tablet: 1 tab(s) orally once a day (at bedtime)  heparin 100 units/mL-D5% intravenous solution: intravenous every minute as per nomogram continuous infusion  morphine 2 mg/mL-NaCl 0.9% intravenous solution: 2 milligram(s) intravenous every 4 hours as needed for  moderate to severe pain  pantoprazole 40 mg oral delayed release tablet: 1 tab(s) orally once a day   ALPRAZolam 0.25 mg oral tablet: 1 tab(s) orally every 8 hours As needed anxiety  aspirin 81 mg oral delayed release tablet: 1 tab(s) orally once a day  atorvastatin 80 mg oral tablet: 1 tab(s) orally once a day (at bedtime)  heparin 100 units/mL-D5% intravenous solution: intravenous every minute as per nomogram continuous infusion  metoprolol tartrate 25 mg oral tablet: 1 tab(s) orally every 12 hours hold for sbp below 90 or hr below 60  morphine 2 mg/mL-NaCl 0.9% intravenous solution: 2 milligram(s) intravenous every 4 hours as needed for  moderate to severe pain  pantoprazole 40 mg oral delayed release tablet: 1 tab(s) orally once a day

## 2024-11-18 NOTE — PATIENT PROFILE ADULT - FALL HARM RISK - HARM RISK INTERVENTIONS

## 2024-11-18 NOTE — ED ADULT NURSE REASSESSMENT NOTE - NS ED NURSE REASSESS COMMENT FT1
Patient eating dinner comfortably at this time. Wife at bedside.
Patient refusing plavix, protonix and lipitor. MD Payan notified and aware.
Patient received by MAHENDRA Downs. Patient A&O4, ambulatory. Patient denies SOb, CP or discomfort at this time. MD aware of elevated troponin. Heparin gtt ordered and started. awaiting admission orders.

## 2024-11-18 NOTE — H&P ADULT - HISTORY OF PRESENT ILLNESS
49 year old male with PMH no significant PMH except current 1 PPD x 30 yrs tobacco use. Pt reports CP while doing nothing yesterday which radiated to his back and shoulders. He reports prior episodes but never this severe. He called 911 who gave him aspirin enroute to hospital. ED gave brilinta 180, CTA without PE/dissection. Given plavix 300 last night and started on heparin infusion.plan for LHC  Admitted  to telemetry unit for monitoring , send 3 sets of cardiac enzymes to rule out acute coronary event, obtain ECHO to evaluate LVEF, cardiology consult  ,continue current management, O2 supply, anticoagulation plan as per cardiology consult Seen by cardiologist Dr Santiago in a past - Patient has  no chest pain at time of card cons  . Patient never has this kind of chest pain in the past . Patient has very active life style and can walk 1 mile and even more prior to this episode .. Patient has elevated Troponin I levels and trending up . Patient has ACS , NON-ST CELIA . Continue aspirin , I/V heparin drip , Plavix and metoprolol and atorvastatin and nitrates as ordered . . Patient cardiac wise stable and cleared for cardiac cath . Patient understands cardiac cath procedures and agrees with that .   maintain NPO since MN status  ECG and labs reviewed  aspirin and brilinta loaded  continue aspirin  ticagrelor vs clopidogrel deferred pending diagnostic LHC outcome/findings   continue heparin infusion  continue statin  BB per non-interventional cardiology   procedure discussed with patient; risks and benefits explained, questions answered  consent to be obtained by attending IC  pre cath IVF ordered to prevent TRAVIS

## 2024-11-18 NOTE — H&P ADULT - ASSESSMENT
49M, current smoker (30 pack years) who presented to [facility] on 11/17 with chest pain radiating to back/shoulders x 2 days. On arrival to ED, he was given ASA and Brilinta 180 mg and Plavix 300 mg. Troponin were elvevated  He was placed on a heparin gtt and admitted to telemetry unit at [facility]. On 11/18, he had cardiac cath via RRA which demonstrated 3vCAD. He is now transferred to [facility] under Dr. Dyson for CABG evaluation.     Neuro:   Pain well controlled on PRN APAP   No delirium, no focal deficits     Cardiac:   Pre-Op CABG   - f/u PST imaging and labs   - needs conduit testing   - continue BB, statin  - heparin gtt given NSTEMI  - PRN nitro   Monitor Vitals :     Pulm:   Saturating well on room air   Encouraging IS   CXR: Pending     GI:   Tolerating diet well   GI PPx: Protonix  Continue with bowel regimen     Renal:   Monitor I/Os   BMP pending     Heme:   CBC Pending   DVT prophylaxis: SCDs and heparin gtt     ID:   Afebrile,   Monitor fever curve     MSK:   Encourage ambulation   PT/OT     Endocrine:   No Hx of DM or Thyroid Disease  - A1C: Pending   - TSH: Pending   Monitor blood glucose levels     Dispo:  Inpatient management

## 2024-11-18 NOTE — PATIENT PROFILE ADULT - NSPROMEDSBROUGHTTOHOSP_GEN_A_NUR
CM made #5 outreach call, called home and cell #, no vm set up, unable to lvm. Unable to reach, close case.  
no

## 2024-11-18 NOTE — CHART NOTE - NSCHARTNOTEFT_GEN_A_CORE
Post Diagnostic Cardiac Catheterization Chart Note      Prelim cath report:   [facility] via RRA  severe multi-vessel obstructive CAD  full/official report to follow      Patient without complaints. Denies CP, SOB, palpitations, N/V, fever/chills, abd pain, numbness/tingling/weakness, other c/o at this time.    A+O x 3, neurologically intact  RRA access site stable (clean, dry, intact, without bleeding, heat, erythema, or hematoma). Radial band in place.  RUE motor, neuro, circ intact.  Hemodynamically stable, neurologically intact, VS stable, afebrile    A/P: s/p diagnostic [facility]  obstructive CAD: tx for CTS/CABG eval  NO ticagrelor/clopidogrel pending CTS/CABG eval  post cath access site precautions reviewed with pt who verbalized good understanding  restart heparin without bolus 4 hours post successful radial band removal  transfer back to [facility] once post cath recovery completed / discharge criteria met and wrist / hemodynamics remain stable (with radial band removed).   tobacco cessation and lifestyle modification education started pre cath, remainder including tobacco cessation referral and further education deferred to primary team  see discharge for post diagnostic cath instructions/plan

## 2024-11-18 NOTE — H&P ADULT - NSHPPHYSICALEXAM_GEN_ALL_CORE
ICU Vital Signs Last 24 Hrs  T(C): 36.8 (18 Nov 2024 18:40), Max: 37.1 (18 Nov 2024 01:48)  T(F): 98.3 (18 Nov 2024 18:40), Max: 98.7 (18 Nov 2024 01:48)  HR: 59 (18 Nov 2024 18:40) (58 - 87)  BP: 142/85 (18 Nov 2024 18:40) (110/62 - 155/70)  BP(mean): 106 (18 Nov 2024 18:40) (106 - 106)  ABP: --  ABP(mean): --  RR: 18 (18 Nov 2024 18:40) (12 - 20)  SpO2: 100% (18 Nov 2024 18:40) (94% - 100%)    O2 Parameters below as of 18 Nov 2024 18:40  Patient On (Oxygen Delivery Method): room air    GEN: NAD, looks comfortable  Psych: Mood appropriate  Neuro: A&Ox3.  No focal deficits.  Moving all extremities.   HEENT: No obvious abnormalities  CV: S1S2, regular, no murmurs appreciated.  No carotid bruits.  No JVD  Lungs: Clear B/L.  No wheezing, rales or rhonchi  ABD: Soft, non-tender, non-distended.  +Bowel sounds  EXT: Warm and well perfused.  No peripheral edema noted  Musculoskeletal: Moving all extremities with normal ROM, no joint swelling  PV: Pedal pulses palpable

## 2024-11-18 NOTE — DISCHARGE NOTE PROVIDER - CARE PROVIDER_API CALL
Ronnie Santiago  Cardiology  [address] [place] [address], [address]  [place] [address]  Phone: [phone]  Fax: [phone]  Follow Up Time: 1 week    Ehsan Oscar  Internal Medicine  [address] [place] [address]  Phone: [phone]  Fax: [phone]  Follow Up Time: 1 week

## 2024-11-18 NOTE — DISCHARGE NOTE PROVIDER - PROVIDER TOKENS
PROVIDER:[TOKEN:[[id number]],FOLLOWUP:[1 week]],PROVIDER:[TOKEN:[[id number]],FOLLOWUP:[1 week]]

## 2024-11-18 NOTE — H&P ADULT - HISTORY OF PRESENT ILLNESS
49M, current smoker (30 pack years) who presented to [facility] on 11/17 with chest pain radiating to back/shoulders x 2 days. On arrival to ED, he was given ASA and Brilinta 180 mg and Plavix 300 mg. Troponin were elvevated  He was placed on a heparin gtt and admitted to telemetry unit at [facility]. On 11/18, he had cardiac cath via RRA which demonstrated 3vCAD. He is now transferred to [facility] under Dr. Dyson for CABG evaluation.  49M, current smoker (30 pack years) who presented to [facility] on 11/17 with chest pain radiating to back/shoulders x 2 days. On arrival to ED, he was given ASA and Brilinta 180 mg and Plavix 300 mg. Troponin were elevated  He was placed on a heparin gtt and admitted to telemetry unit at [facility]. On 11/18, he had cardiac cath via RRA which demonstrated 3vCAD. He is now transferred to [facility] under Dr. Dyson for CABG evaluation. Patient reports he has had chest pain intermittently for years but stress test was negative.  On arrival to [facility], patient denies chest pain, shortness of breath, nausea, vomiting.

## 2024-11-18 NOTE — CONSULT NOTE ADULT - SUBJECTIVE AND OBJECTIVE BOX
Department of Cardiology                                                               Division of Interventional Cardiology                                                               [facility] / [address] [address]                                                                                 [phone]                                                                                                                               Interventional Cardiology Consult / Pre-Procedure Note    HPI: 49 year old male with PMH no significant PMH except current 1 PPD x 30 yrs tobacco use. Pt reports CP while doing nothing yesterday which radiated to his back and shoulders. He reports prior episodes but never this severe. He called 911 who gave him aspirin enroute to hospital. ED gave brilinta 180, CTA without PE/dissection. Given plavix 300 last night and started on heparin infusion.    Subjective/ROS: no c/o offered at this time. Denies CP, SOB, palpitations, N/V, fever/chills, abd pain, numbness/tingling/weakness, other c/o at this time.  ROS negative x 10 systems except as documented as above.    PAST MEDICAL & SURGICAL HISTORY: denied    Social History:  Marital status: had domestic partner  Lives with: girlfriend  Occupation: construction  ADLs: independent  Assistive Devices: none    Tobacco use: current 1 PPD x 30 yrs tobacco use  Alcohol use: says 3-4 drinks 2 x month  Illicit drug use: occasionally smokes marijuana    Code status: full code  HCP/surrogate:       MEDICATIONS  (STANDING):  aspirin enteric coated 81 milliGRAM(s) Oral daily  atorvastatin 80 milliGRAM(s) Oral at bedtime  heparin  Infusion.  Unit(s)/Hr (10 mL/Hr) IV Continuous   pantoprazole    Tablet 40 milliGRAM(s) Oral daily  sodium chloride 0.9% Bolus 250 milliLiter(s) IV Bolus once  sodium chloride 0.9%. 1000 milliLiter(s) (100 mL/Hr) IV Continuous     MEDICATIONS  (PRN):  ALPRAZolam 0.25 milliGRAM(s) Oral every 8 hours PRN anxiety  aluminum hydroxide/magnesium hydroxide/simethicone Suspension 30 milliLiter(s) Oral every 4 hours PRN Dyspepsia  heparin   Injectable 6000 Unit(s) IV Push every 6 hours PRN For aPTT less than 40  morphine  - Injectable 2 milliGRAM(s) IV Push every 4 hours PRN Moderate Pain (4 - 6)    No Known Allergies      T(C): 36.2 (24 @ 04:47), Max: 37.1 (24 @ 01:48)  HR: 60 (24 @ 04:47) (58 - 87)  Tele: SB/SR 50-80s, no ectopy  BP: 110/62 (24 @ 04:47) (110/62 - 131/68)  RR: 17 (24 @ 04:47) (17 - 18)  SpO2: 94% (24 @ 04:47) (94% - 98%) on room air    Daily Height in cm: 175.26 (2024 14:48)    Daily Weight in k.1 (2024 04:47)    I&O's Summary  2024 07:01  -  2024 07:00  --------------------------------------------------------  IN: 60 mL / OUT: 0 mL / NET: 60 mL      LABS:	 	                        14.2   10.53 )-----------( 282      ( 2024 04:50 )             41.2         141  |  109[H]  |  19  ----------------------------<  146[H]  3.7   |  22  |  1.00    Ca    7.9[L]      2024 04:50  Mg     2.4       TPro  6.5  /  Alb  3.2[L]  /  TBili  0.3  /  DBili  x   /  AST  25  /  ALT  39  /  AlkPhos  73      Troponin I, High Sensitivity Result: 698.5 ng/L (24 @ 04:50)  Troponin I, High Sensitivity Result: 271.4 ng/L (24 @ 19:25)  Troponin I, High Sensitivity Result: 207.6 ng/L (24 @ 18:10)  Troponin I, High Sensitivity Result: 24.1 ng/L (24 @ 15:22)    Activated Partial Thromboplastin Time: 53.6 sec (24 @ 01:12)    Prothrombin Time, Plasma: 11.0 sec (24 @ 15:22)  INR: 0.94 ratio (24 @ 15:22)      < from: CT Angio Abdomen and Pelvis w/ IV Cont (24 @ 16:42) >  < from: CT Angio Chest Aorta w/wo IV Cont (24 @ 16:42) >  IMPRESSION:  No acute aortic dissection within the limitations of this non-ECG gated study. Ectasia of the infrarenal abdominal aorta, measuring 2.8 cm, with small focus of pedunculated plaque. Additional multifocal aortoiliac plaque as described above.  Multivessel coronary artery calcifications. Recommend cardiology evaluation.  Enlarged liver with diffuse steatosis and morphologic features of early cirrhosis. Right hepatic lobe 1 cm hypervascular focus on image 97 series 204 which is indeterminate. Contrast-enhanced abdominal MRI is recommended for characterization.  Sub-4 mm pulmonary nodules can be reevaluated with subsequent chest CT in 12 months, or aspiration risk factors.  --- End of Report ---  < end of copied text >      24 @ 1452 hrs ECG (my read): NSR @ 62, inf Q waves, lateral STD       Physical Exam:  Constitutional: NAD  Neuro: A+O x 3, non-focal, speech clear and intact  HEENT: NC/AT, PERRL, EOMI, anicteric sclerae, oral mucosa pink and moist  Neck: supple, no JVD  CV: regular rate, regular rhythm, +S1S2, no murmurs or rub  Pulm/chest: lung sounds CTA and equal bilaterally, no accessory muscle use noted  Abd: soft, NT, ND  Ext: GARCIA x 4, no C/C/E  Pulses: R radial 2+, bilat DP 2+  Skin: warm, well perfused  Psych: calm, appropriate affect

## 2024-11-19 ENCOUNTER — RESULT REVIEW (OUTPATIENT)
Age: 49
End: 2024-11-19

## 2024-11-19 VITALS
OXYGEN SATURATION: 94 % | HEART RATE: 76 BPM | SYSTOLIC BLOOD PRESSURE: 104 MMHG | DIASTOLIC BLOOD PRESSURE: 68 MMHG | TEMPERATURE: 98 F | RESPIRATION RATE: 18 BRPM

## 2024-11-19 LAB
ALBUMIN SERPL ELPH-MCNC: 4 G/DL — SIGNIFICANT CHANGE UP (ref 3.3–5)
ALP SERPL-CCNC: 76 U/L — SIGNIFICANT CHANGE UP (ref 40–120)
ALT FLD-CCNC: 25 U/L — SIGNIFICANT CHANGE UP (ref 10–45)
ANION GAP SERPL CALC-SCNC: 9 MMOL/L — SIGNIFICANT CHANGE UP (ref 5–17)
APTT BLD: 44 SEC — HIGH (ref 24.5–35.6)
APTT BLD: 46.4 SEC — HIGH (ref 24.5–35.6)
APTT BLD: 50.7 SEC — HIGH (ref 24.5–35.6)
APTT BLD: 51.9 SEC — HIGH (ref 24.5–35.6)
AST SERPL-CCNC: 18 U/L — SIGNIFICANT CHANGE UP (ref 10–40)
BILIRUB SERPL-MCNC: 0.2 MG/DL — SIGNIFICANT CHANGE UP (ref 0.2–1.2)
BUN SERPL-MCNC: 14 MG/DL — SIGNIFICANT CHANGE UP (ref 7–23)
CALCIUM SERPL-MCNC: 8.8 MG/DL — SIGNIFICANT CHANGE UP (ref 8.4–10.5)
CHLORIDE SERPL-SCNC: 104 MMOL/L — SIGNIFICANT CHANGE UP (ref 96–108)
CO2 SERPL-SCNC: 25 MMOL/L — SIGNIFICANT CHANGE UP (ref 22–31)
CREAT SERPL-MCNC: 1.07 MG/DL — SIGNIFICANT CHANGE UP (ref 0.5–1.3)
EGFR: 85 ML/MIN/1.73M2 — SIGNIFICANT CHANGE UP
GLUCOSE SERPL-MCNC: 123 MG/DL — HIGH (ref 70–99)
HCT VFR BLD CALC: 42.8 % — SIGNIFICANT CHANGE UP (ref 39–50)
HGB BLD-MCNC: 14.2 G/DL — SIGNIFICANT CHANGE UP (ref 13–17)
INR BLD: 0.93 — SIGNIFICANT CHANGE UP (ref 0.85–1.16)
INR BLD: 0.95 — SIGNIFICANT CHANGE UP (ref 0.85–1.16)
INR BLD: 0.96 — SIGNIFICANT CHANGE UP (ref 0.85–1.16)
MAGNESIUM SERPL-MCNC: 2.2 MG/DL — SIGNIFICANT CHANGE UP (ref 1.6–2.6)
MCHC RBC-ENTMCNC: 29.5 PG — SIGNIFICANT CHANGE UP (ref 27–34)
MCHC RBC-ENTMCNC: 33.2 G/DL — SIGNIFICANT CHANGE UP (ref 32–36)
MCV RBC AUTO: 89 FL — SIGNIFICANT CHANGE UP (ref 80–100)
NRBC # BLD: 0 /100 WBCS — SIGNIFICANT CHANGE UP (ref 0–0)
PA ADP PRP-ACNC: 196 PRU — SIGNIFICANT CHANGE UP (ref 182–335)
PHOSPHATE SERPL-MCNC: 3 MG/DL — SIGNIFICANT CHANGE UP (ref 2.5–4.5)
PLATELET # BLD AUTO: 281 K/UL — SIGNIFICANT CHANGE UP (ref 150–400)
POTASSIUM SERPL-MCNC: 3.8 MMOL/L — SIGNIFICANT CHANGE UP (ref 3.5–5.3)
POTASSIUM SERPL-SCNC: 3.8 MMOL/L — SIGNIFICANT CHANGE UP (ref 3.5–5.3)
PROT SERPL-MCNC: 6.9 G/DL — SIGNIFICANT CHANGE UP (ref 6–8.3)
PROTHROM AB SERPL-ACNC: 10.7 SEC — SIGNIFICANT CHANGE UP (ref 9.9–13.4)
PROTHROM AB SERPL-ACNC: 10.9 SEC — SIGNIFICANT CHANGE UP (ref 9.9–13.4)
PROTHROM AB SERPL-ACNC: 11.2 SEC — SIGNIFICANT CHANGE UP (ref 9.9–13.4)
RBC # BLD: 4.81 M/UL — SIGNIFICANT CHANGE UP (ref 4.2–5.8)
RBC # FLD: 13.2 % — SIGNIFICANT CHANGE UP (ref 10.3–14.5)
SODIUM SERPL-SCNC: 138 MMOL/L — SIGNIFICANT CHANGE UP (ref 135–145)
WBC # BLD: 10.09 K/UL — SIGNIFICANT CHANGE UP (ref 3.8–10.5)
WBC # FLD AUTO: 10.09 K/UL — SIGNIFICANT CHANGE UP (ref 3.8–10.5)

## 2024-11-19 PROCEDURE — 99233 SBSQ HOSP IP/OBS HIGH 50: CPT

## 2024-11-19 PROCEDURE — 94010 BREATHING CAPACITY TEST: CPT | Mod: 26

## 2024-11-19 PROCEDURE — 93306 TTE W/DOPPLER COMPLETE: CPT | Mod: 26

## 2024-11-19 RX ORDER — METOPROLOL TARTRATE 100 MG/1
25 TABLET, FILM COATED ORAL EVERY 12 HOURS
Refills: 0 | Status: DISCONTINUED | OUTPATIENT
Start: 2024-11-19 | End: 2024-11-22

## 2024-11-19 RX ORDER — SODIUM CHLORIDE 9 MG/ML
3 INJECTION, SOLUTION INTRAMUSCULAR; INTRAVENOUS; SUBCUTANEOUS EVERY 8 HOURS
Refills: 0 | Status: DISCONTINUED | OUTPATIENT
Start: 2024-11-19 | End: 2024-11-22

## 2024-11-19 RX ORDER — POTASSIUM CHLORIDE 600 MG/1
20 TABLET, EXTENDED RELEASE ORAL ONCE
Refills: 0 | Status: COMPLETED | OUTPATIENT
Start: 2024-11-19 | End: 2024-11-19

## 2024-11-19 RX ORDER — INFLUENZA VIRUS VACCINE 15; 15; 15; 15 UG/.5ML; UG/.5ML; UG/.5ML; UG/.5ML
0.5 SUSPENSION INTRAMUSCULAR ONCE
Refills: 0 | Status: DISCONTINUED | OUTPATIENT
Start: 2024-11-19 | End: 2024-11-27

## 2024-11-19 RX ADMIN — SODIUM CHLORIDE 3 MILLILITER(S): 9 INJECTION, SOLUTION INTRAMUSCULAR; INTRAVENOUS; SUBCUTANEOUS at 13:36

## 2024-11-19 RX ADMIN — Medication 80 MILLIGRAM(S): at 21:00

## 2024-11-19 RX ADMIN — Medication 30 MILLIGRAM(S): at 11:20

## 2024-11-19 RX ADMIN — SODIUM CHLORIDE 3 MILLILITER(S): 9 INJECTION, SOLUTION INTRAMUSCULAR; INTRAVENOUS; SUBCUTANEOUS at 20:24

## 2024-11-19 RX ADMIN — POTASSIUM CHLORIDE 20 MILLIEQUIVALENT(S): 600 TABLET, EXTENDED RELEASE ORAL at 06:09

## 2024-11-19 RX ADMIN — METOPROLOL TARTRATE 25 MILLIGRAM(S): 100 TABLET, FILM COATED ORAL at 17:41

## 2024-11-19 RX ADMIN — METOPROLOL TARTRATE 25 MILLIGRAM(S): 100 TABLET, FILM COATED ORAL at 05:26

## 2024-11-19 RX ADMIN — Medication 81 MILLIGRAM(S): at 11:20

## 2024-11-19 RX ADMIN — ACETAMINOPHEN 500MG 650 MILLIGRAM(S): 500 TABLET, COATED ORAL at 21:37

## 2024-11-19 RX ADMIN — PANTOPRAZOLE SODIUM 40 MILLIGRAM(S): 40 TABLET, DELAYED RELEASE ORAL at 06:10

## 2024-11-19 RX ADMIN — ACETAMINOPHEN 500MG 650 MILLIGRAM(S): 500 TABLET, COATED ORAL at 21:03

## 2024-11-19 RX ADMIN — SODIUM CHLORIDE 3 MILLILITER(S): 9 INJECTION, SOLUTION INTRAMUSCULAR; INTRAVENOUS; SUBCUTANEOUS at 05:14

## 2024-11-19 RX ADMIN — Medication 10 UNIT(S)/HR: at 21:00

## 2024-11-19 RX ADMIN — POLYETHYLENE GLYCOL 3350 17 GRAM(S): 17 POWDER, FOR SOLUTION ORAL at 11:20

## 2024-11-19 NOTE — PROGRESS NOTE ADULT - SUBJECTIVE AND OBJECTIVE BOX
CTICU  CRITICAL  CARE  attending     Hand off received 					   Pertinent clinical, laboratory, radiographic, hemodynamic, echocardiographic, respiratory data, microbiologic data and chart were reviewed and analyzed frequently throughout the course of the day  Patient seen and examined with CTS/ SH attending at bedside  Pt is a 49yr old male with no reported PMH initially presented to [facility] 11/17 for CP. Found to have troponemia and was admitted for ACS. Cath on 11/18 revealed 3V CAD for which pt was tx to [facility] 11/18 for surgical evaluation. Pt is undergoing preoperative planning.     FAMILY HISTORY:  PAST MEDICAL & SURGICAL HISTORY:  No pertinent past medical history  Back pain        Patient is a 49y old  Male who presents with a chief complaint of CAD.      14 system review was unremarkable    Vital signs, hemodynamic and respiratory parameters were reviewed from the bedside nursing flowsheet.  ICU Vital Signs Last 24 Hrs  T(C): 36.1 (19 Nov 2024 05:10), Max: 36.8 (18 Nov 2024 18:40)  T(F): 97 (19 Nov 2024 05:10), Max: 98.3 (18 Nov 2024 18:40)  HR: 57 (19 Nov 2024 06:25) (57 - 71)  BP: 135/82 (19 Nov 2024 06:25) (111/60 - 155/70)  BP(mean): 101 (19 Nov 2024 06:25) (95 - 109)  ABP: --  ABP(mean): --  RR: 16 (19 Nov 2024 06:25) (12 - 20)  SpO2: 96% (19 Nov 2024 06:25) (94% - 100%)    O2 Parameters below as of 19 Nov 2024 06:25  Patient On (Oxygen Delivery Method): room air          Adult Advanced Hemodynamics Last 24 Hrs  CVP(mm Hg): --  CVP(cm H2O): --  CO: --  CI: --  PA: --  PA(mean): --  PCWP: --  SVR: --  SVRI: --  PVR: --  PVRI: --,     Intake and output was reviewed and the fluid balance was calculated  Daily Height in cm: 175.26 (18 Nov 2024 19:00)    Daily   I&O's Summary    18 Nov 2024 07:01  -  19 Nov 2024 07:00  --------------------------------------------------------  IN: 854 mL / OUT: 1150 mL / NET: -296 mL        All lines and drain sites were assessed  Glycemic trend was reviewedCAPILLARY BLOOD GLUCOSE    Neuro: well appearing male, lying down in nad  HEENT: mmm  Heart: s1 s2  Lungs: clear bl  Abdomen: soft nt nd  Extremities: wwp, no edema        labs  CBC Full  -  ( 19 Nov 2024 01:06 )  WBC Count : 10.09 K/uL  RBC Count : 4.81 M/uL  Hemoglobin : 14.2 g/dL  Hematocrit : 42.8 %  Platelet Count - Automated : 281 K/uL  Mean Cell Volume : 89.0 fl  Mean Cell Hemoglobin : 29.5 pg  Mean Cell Hemoglobin Concentration : 33.2 g/dL  Auto Neutrophil # : x  Auto Lymphocyte # : x  Auto Monocyte # : x  Auto Eosinophil # : x  Auto Basophil # : x  Auto Neutrophil % : x  Auto Lymphocyte % : x  Auto Monocyte % : x  Auto Eosinophil % : x  Auto Basophil % : x    11-19    138  |  104  |  14  ----------------------------<  123[H]  3.8   |  25  |  1.07    Ca    8.8      19 Nov 2024 01:06  Phos  3.0     11-19  Mg     2.2     11-19    TPro  6.9  /  Alb  4.0  /  TBili  0.2  /  DBili  x   /  AST  18  /  ALT  25  /  AlkPhos  76  11-19    PT/INR - ( 19 Nov 2024 01:06 )   PT: 11.2 sec;   INR: 0.96          PTT - ( 19 Nov 2024 01:06 )  PTT:44.0 sec  The current medications were reviewed   MEDICATIONS  (STANDING):  aspirin  chewable 81 milliGRAM(s) Oral daily  atorvastatin 80 milliGRAM(s) Oral at bedtime  heparin  Infusion 900 Unit(s)/Hr (10 mL/Hr) IV Continuous <Continuous>  influenza   Vaccine 0.5 milliLiter(s) IntraMuscular once  isosorbide   mononitrate ER Tablet (IMDUR) 30 milliGRAM(s) Oral daily  metoprolol tartrate 25 milliGRAM(s) Oral every 12 hours  pantoprazole    Tablet 40 milliGRAM(s) Oral before breakfast  polyethylene glycol 3350 17 Gram(s) Oral daily  senna 2 Tablet(s) Oral at bedtime  sodium chloride 0.9% lock flush 3 milliLiter(s) IV Push every 8 hours    MEDICATIONS  (PRN):  acetaminophen     Tablet .. 650 milliGRAM(s) Oral every 6 hours PRN Mild Pain (1 - 3)  nitroglycerin     SubLingual 0.4 milliGRAM(s) SubLingual every 5 minutes PRN Chest Pain      Assessment/Plan:  49yr old male with no reported PMH initially presented to [facility] 11/17 for CP. Found to have troponemia and was admitted for ACS. Cath on 11/18 revealed 3V CAD for which pt was tx to [facility] 11/18 for surgical evaluation. Pt is undergoing preoperative planning.     3V CAD  Preoperative planning  Continue heparin gtt  PTT per protocol  Aspirin  Statin  BBlocker  Imdur  Diet as tolerated  Replete lytes prn  GI/DVT PPX  Bowel Regimen  Pain control  OOB  Close hemodynamic, ventilatory and drain monitoring and management per post op routine  Monitor for arrhythmias and monitor parameters for organ perfusion  Monitor neurologic status  Head of the bed should remain elevated to 45 deg   Chest PT and IS will be encouraged  Monitor adequacy of oxygenation and ventilation and attempt to wean oxygen  Antibiotic regimen will be tailored to the clinical, laboratory and microbiologic data  Nutritional goals will be met using po eventually, ensure adequate caloric intake and monitor the same  Stress ulcer and VTE prophylaxis will be achieved    Glycemic control is satisfactory  Electrolytes have been repleted as necessary and wound care has been carried out   Pain control has been achieved.   Aggressive physical therapy and early mobility and ambulation goals will be met   The family was updated about the course and plan.    CRITICAL CARE TIME personally provided by me  in evaluation and management, reassessments, review and interpretation of labs and x-rays, ventilator and hemodynamic management, formulating a plan and coordinating care: ___35____ MIN.  Time does not include procedural time.           CTICU ATTENDING     					  Shane Morillo MD

## 2024-11-20 LAB
ANION GAP SERPL CALC-SCNC: 11 MMOL/L — SIGNIFICANT CHANGE UP (ref 5–17)
APTT BLD: 45.4 SEC — HIGH (ref 24.5–35.6)
APTT BLD: 52.9 SEC — HIGH (ref 24.5–35.6)
APTT BLD: 55.4 SEC — HIGH (ref 24.5–35.6)
APTT BLD: 56.4 SEC — HIGH (ref 24.5–35.6)
BASE EXCESS BLDA CALC-SCNC: -1.9 MMOL/L — SIGNIFICANT CHANGE UP (ref -2–3)
BLD GP AB SCN SERPL QL: NEGATIVE — SIGNIFICANT CHANGE UP
BUN SERPL-MCNC: 17 MG/DL — SIGNIFICANT CHANGE UP (ref 7–23)
CALCIUM SERPL-MCNC: 9 MG/DL — SIGNIFICANT CHANGE UP (ref 8.4–10.5)
CHLORIDE SERPL-SCNC: 103 MMOL/L — SIGNIFICANT CHANGE UP (ref 96–108)
CO2 BLDA-SCNC: 23 MMOL/L — SIGNIFICANT CHANGE UP (ref 19–24)
CO2 SERPL-SCNC: 24 MMOL/L — SIGNIFICANT CHANGE UP (ref 22–31)
CREAT SERPL-MCNC: 1.1 MG/DL — SIGNIFICANT CHANGE UP (ref 0.5–1.3)
EGFR: 82 ML/MIN/1.73M2 — SIGNIFICANT CHANGE UP
GLUCOSE SERPL-MCNC: 101 MG/DL — HIGH (ref 70–99)
HCO3 BLDA-SCNC: 22 MMOL/L — SIGNIFICANT CHANGE UP (ref 21–28)
HCT VFR BLD CALC: 42.6 % — SIGNIFICANT CHANGE UP (ref 39–50)
HCT VFR BLD CALC: 43.2 % — SIGNIFICANT CHANGE UP (ref 39–50)
HGB BLD-MCNC: 14.2 G/DL — SIGNIFICANT CHANGE UP (ref 13–17)
HGB BLD-MCNC: 14.4 G/DL — SIGNIFICANT CHANGE UP (ref 13–17)
INR BLD: 0.95 — SIGNIFICANT CHANGE UP (ref 0.85–1.16)
INR BLD: 0.96 — SIGNIFICANT CHANGE UP (ref 0.85–1.16)
INR BLD: 0.96 — SIGNIFICANT CHANGE UP (ref 0.85–1.16)
MAGNESIUM SERPL-MCNC: 2.1 MG/DL — SIGNIFICANT CHANGE UP (ref 1.6–2.6)
MCHC RBC-ENTMCNC: 29.8 PG — SIGNIFICANT CHANGE UP (ref 27–34)
MCHC RBC-ENTMCNC: 30.6 PG — SIGNIFICANT CHANGE UP (ref 27–34)
MCHC RBC-ENTMCNC: 33.3 G/DL — SIGNIFICANT CHANGE UP (ref 32–36)
MCHC RBC-ENTMCNC: 33.3 G/DL — SIGNIFICANT CHANGE UP (ref 32–36)
MCV RBC AUTO: 89.5 FL — SIGNIFICANT CHANGE UP (ref 80–100)
MCV RBC AUTO: 91.7 FL — SIGNIFICANT CHANGE UP (ref 80–100)
NRBC # BLD: 0 /100 WBCS — SIGNIFICANT CHANGE UP (ref 0–0)
NRBC # BLD: 0 /100 WBCS — SIGNIFICANT CHANGE UP (ref 0–0)
PA ADP PRP-ACNC: 183 PRU — SIGNIFICANT CHANGE UP (ref 182–335)
PA ADP PRP-ACNC: 211 PRU — SIGNIFICANT CHANGE UP (ref 182–335)
PCO2 BLDA: 35 MMHG — SIGNIFICANT CHANGE UP (ref 35–48)
PH BLDA: 7.41 — SIGNIFICANT CHANGE UP (ref 7.35–7.45)
PHOSPHATE SERPL-MCNC: 4.1 MG/DL — SIGNIFICANT CHANGE UP (ref 2.5–4.5)
PLATELET # BLD AUTO: 261 K/UL — SIGNIFICANT CHANGE UP (ref 150–400)
PLATELET # BLD AUTO: 307 K/UL — SIGNIFICANT CHANGE UP (ref 150–400)
PO2 BLDA: 97 MMHG — SIGNIFICANT CHANGE UP (ref 83–108)
POTASSIUM SERPL-MCNC: 4.3 MMOL/L — SIGNIFICANT CHANGE UP (ref 3.5–5.3)
POTASSIUM SERPL-SCNC: 4.3 MMOL/L — SIGNIFICANT CHANGE UP (ref 3.5–5.3)
PROTHROM AB SERPL-ACNC: 11 SEC — SIGNIFICANT CHANGE UP (ref 9.9–13.4)
PROTHROM AB SERPL-ACNC: 11.1 SEC — SIGNIFICANT CHANGE UP (ref 9.9–13.4)
PROTHROM AB SERPL-ACNC: 11.2 SEC — SIGNIFICANT CHANGE UP (ref 9.9–13.4)
RBC # BLD: 4.71 M/UL — SIGNIFICANT CHANGE UP (ref 4.2–5.8)
RBC # BLD: 4.76 M/UL — SIGNIFICANT CHANGE UP (ref 4.2–5.8)
RBC # FLD: 13.1 % — SIGNIFICANT CHANGE UP (ref 10.3–14.5)
RBC # FLD: 13.1 % — SIGNIFICANT CHANGE UP (ref 10.3–14.5)
RH IG SCN BLD-IMP: POSITIVE — SIGNIFICANT CHANGE UP
SAO2 % BLDA: 98.5 % — HIGH (ref 94–98)
SODIUM SERPL-SCNC: 138 MMOL/L — SIGNIFICANT CHANGE UP (ref 135–145)
WBC # BLD: 12.4 K/UL — HIGH (ref 3.8–10.5)
WBC # BLD: 12.56 K/UL — HIGH (ref 3.8–10.5)
WBC # FLD AUTO: 12.4 K/UL — HIGH (ref 3.8–10.5)
WBC # FLD AUTO: 12.56 K/UL — HIGH (ref 3.8–10.5)

## 2024-11-20 PROCEDURE — 99233 SBSQ HOSP IP/OBS HIGH 50: CPT

## 2024-11-20 RX ORDER — HEPARIN SODIUM,PORCINE 1000/ML
1050 VIAL (ML) INJECTION
Qty: 25000 | Refills: 0 | Status: DISCONTINUED | OUTPATIENT
Start: 2024-11-20 | End: 2024-11-22

## 2024-11-20 RX ADMIN — POLYETHYLENE GLYCOL 3350 17 GRAM(S): 17 POWDER, FOR SOLUTION ORAL at 12:05

## 2024-11-20 RX ADMIN — Medication 10.5 UNIT(S)/HR: at 17:24

## 2024-11-20 RX ADMIN — ACETAMINOPHEN 500MG 650 MILLIGRAM(S): 500 TABLET, COATED ORAL at 21:36

## 2024-11-20 RX ADMIN — SODIUM CHLORIDE 3 MILLILITER(S): 9 INJECTION, SOLUTION INTRAMUSCULAR; INTRAVENOUS; SUBCUTANEOUS at 21:08

## 2024-11-20 RX ADMIN — Medication 81 MILLIGRAM(S): at 12:05

## 2024-11-20 RX ADMIN — METOPROLOL TARTRATE 25 MILLIGRAM(S): 100 TABLET, FILM COATED ORAL at 17:12

## 2024-11-20 RX ADMIN — Medication 10.5 UNIT(S)/HR: at 20:30

## 2024-11-20 RX ADMIN — Medication 80 MILLIGRAM(S): at 21:31

## 2024-11-20 RX ADMIN — SODIUM CHLORIDE 3 MILLILITER(S): 9 INJECTION, SOLUTION INTRAMUSCULAR; INTRAVENOUS; SUBCUTANEOUS at 14:22

## 2024-11-20 RX ADMIN — ACETAMINOPHEN 500MG 650 MILLIGRAM(S): 500 TABLET, COATED ORAL at 22:20

## 2024-11-20 RX ADMIN — PANTOPRAZOLE SODIUM 40 MILLIGRAM(S): 40 TABLET, DELAYED RELEASE ORAL at 06:35

## 2024-11-20 RX ADMIN — SODIUM CHLORIDE 3 MILLILITER(S): 9 INJECTION, SOLUTION INTRAMUSCULAR; INTRAVENOUS; SUBCUTANEOUS at 05:00

## 2024-11-20 RX ADMIN — Medication 12.5 UNIT(S)/HR: at 23:30

## 2024-11-20 RX ADMIN — Medication 30 MILLIGRAM(S): at 12:07

## 2024-11-20 NOTE — PROGRESS NOTE ADULT - SUBJECTIVE AND OBJECTIVE BOX
CTICU  CRITICAL  CARE  attending     Hand off received 					   Pertinent clinical, laboratory, radiographic, hemodynamic, echocardiographic, respiratory data, microbiologic data and chart were reviewed and analyzed frequently throughout the course of the day  Patient seen and examined with CTS/ SH attending at bedside  Pt is a 49yr old male with no reported PMH initially presented to [facility]  for CP. Found to have troponemia and was admitted for ACS. Cath on  revealed 3V CAD for which pt was tx to [facility]  for surgical evaluation. Pt is undergoing preoperative planning.       FAMILY HISTORY:  PAST MEDICAL & SURGICAL HISTORY:  No pertinent past medical history  Back pain        Patient is a 49y old  Male who presents with a chief complaint of CAD.      14 system review was unremarkable    Vital signs, hemodynamic and respiratory parameters were reviewed from the bedside nursing flowsheet.  ICU Vital Signs Last 24 Hrs  T(C): 36.4 (2024 05:01), Max: 36.9 (2024 12:46)  T(F): 97.5 (2024 05:01), Max: 98.5 (2024 12:46)  HR: 59 (2024 06:35) (53 - 76)  BP: 134/73 (2024 06:35) (104/68 - 140/77)  BP(mean): 96 (2024 06:35) (78 - 101)  ABP: --  ABP(mean): --  RR: 17 (2024 06:35) (16 - 18)  SpO2: 97% (2024 06:35) (94% - 98%)    O2 Parameters below as of 2024 06:35  Patient On (Oxygen Delivery Method): room air          Adult Advanced Hemodynamics Last 24 Hrs  CVP(mm Hg): --  CVP(cm H2O): --  CO: --  CI: --  PA: --  PA(mean): --  PCWP: --  SVR: --  SVRI: --  PVR: --  PVRI: --,     Intake and output was reviewed and the fluid balance was calculated  Daily     Daily Weight in k.5 (2024 07:30)  I&O's Summary    2024 07:01  -  2024 07:00  --------------------------------------------------------  IN: 790 mL / OUT: 400 mL / NET: 390 mL        All lines and drain sites were assessed  Glycemic trend was reviewedCAPILLARY BLOOD GLUCOSE      Neuro: well appearing male, lying down in nad  HEENT: mmm  Heart: s1 s2  Lungs: clear bl  Abdomen: soft nt nd  Extremities: wwp, no edema      labs  CBC Full  -  ( 2024 03:27 )  WBC Count : 12.40 K/uL  RBC Count : 4.71 M/uL  Hemoglobin : 14.4 g/dL  Hematocrit : 43.2 %  Platelet Count - Automated : 261 K/uL  Mean Cell Volume : 91.7 fl  Mean Cell Hemoglobin : 30.6 pg  Mean Cell Hemoglobin Concentration : 33.3 g/dL  Auto Neutrophil # : x  Auto Lymphocyte # : x  Auto Monocyte # : x  Auto Eosinophil # : x  Auto Basophil # : x  Auto Neutrophil % : x  Auto Lymphocyte % : x  Auto Monocyte % : x  Auto Eosinophil % : x  Auto Basophil % : x    11-20    138  |  103  |  17  ----------------------------<  101[H]  4.3   |  24  |  1.10    Ca    9.0      2024 03:27  Phos  4.1     11-20  Mg     2.1     11-20    TPro  6.9  /  Alb  4.0  /  TBili  0.2  /  DBili  x   /  AST  18  /  ALT  25  /  AlkPhos  76  11-19    PT/INR - ( 2024 03:27 )   PT: 11.0 sec;   INR: 0.96          PTT - ( 2024 03:27 )  PTT:56.4 sec  The current medications were reviewed   MEDICATIONS  (STANDING):  aspirin  chewable 81 milliGRAM(s) Oral daily  atorvastatin 80 milliGRAM(s) Oral at bedtime  heparin  Infusion 900 Unit(s)/Hr (10 mL/Hr) IV Continuous <Continuous>  influenza   Vaccine 0.5 milliLiter(s) IntraMuscular once  isosorbide   mononitrate ER Tablet (IMDUR) 30 milliGRAM(s) Oral daily  metoprolol tartrate 25 milliGRAM(s) Oral every 12 hours  pantoprazole    Tablet 40 milliGRAM(s) Oral before breakfast  polyethylene glycol 3350 17 Gram(s) Oral daily  senna 2 Tablet(s) Oral at bedtime  sodium chloride 0.9% lock flush 3 milliLiter(s) IV Push every 8 hours    MEDICATIONS  (PRN):  acetaminophen     Tablet .. 650 milliGRAM(s) Oral every 6 hours PRN Mild Pain (1 - 3)  nitroglycerin     SubLingual 0.4 milliGRAM(s) SubLingual every 5 minutes PRN Chest Pain      Assessment/Plan:  49yr old male with no reported PMH initially presented to [facility]  for CP. Found to have troponemia and was admitted for ACS. Cath on  revealed 3V CAD for which pt was tx to [facility]  for surgical evaluation. Pt is undergoing preoperative planning.     3V CAD  Preoperative planning  Continue heparin gtt  PTT per protocol  Aspirin  Statin  BBlocker  Imdur  Diet as tolerated  Replete lytes prn  GI/DVT PPX  Bowel Regimen  Pain control  OOB  Close hemodynamic, ventilatory and drain monitoring and management per post op routine  Monitor for arrhythmias and monitor parameters for organ perfusion  Monitor neurologic status  Head of the bed should remain elevated to 45 deg   Chest PT and IS will be encouraged  Monitor adequacy of oxygenation and ventilation and attempt to wean oxygen  Antibiotic regimen will be tailored to the clinical, laboratory and microbiologic data  Nutritional goals will be met using po eventually, ensure adequate caloric intake and monitor the same  Stress ulcer and VTE prophylaxis will be achieved    Glycemic control is satisfactory  Electrolytes have been repleted as necessary and wound care has been carried out   Pain control has been achieved.   Aggressive physical therapy and early mobility and ambulation goals will be met   The family was updated about the course and plan.    CRITICAL CARE TIME personally provided by me  in evaluation and management, reassessments, review and interpretation of labs and x-rays, ventilator and hemodynamic management, formulating a plan and coordinating care: __35____ MIN.  Time does not include procedural time.          CTICU ATTENDING     					  Shane Morillo MD

## 2024-11-20 NOTE — PROGRESS NOTE ADULT - ASSESSMENT
49M, current smoker (30 pack years) who presented to [facility] on 11/17 with chest pain radiating to back/shoulders x 2 days. On arrival to ED, he was given ASA and Brilinta 180 mg and Plavix 300 mg. Troponin were elvevated  He was placed on a heparin gtt and admitted to telemetry unit at [facility]. On 11/18, he had cardiac cath via RRA which demonstrated 3vCAD. He is now transferred to [facility] under Dr. Dyson for CABG evaluation.     A/P:  Neurovascular: No delirium. Pain well controlled with current regimen.  -PRN's.    Cardiovascular: Hemodynamically stable. HR controlled.  -BP. HR. EKG. TTE. Cardiac Panel. Lipid Panel. BNP.   -ASA. Plavix. BBlocker. Statin.      Respiratory: 02 Sat = 98% on RA.  -If on oxygen wean to RA from for O2 Sat > 93%.  -Encourage C+DB and Use of IS 10x / hr while awake.  -CXR.    GI: Stable.  -NPO after MN.  -PPX.  -PO Diet.    Renal / :  -Monitor renal function.  -Monitor I/O's.    Endocrine:    -A1c.  -TSH.    Hematologic:  -CBC.  -Coagulation Panel.    ID:  -Tempature.  -CBC.  -Observe for SIRS/Sepsis Syndrome.    Prophylaxis:  -DVT prophylaxis with 5000 SubQ Heparin q8h.  -SCD's    Disposition:  -ICU for frequent monitoring.   49M, current smoker (30 pack years), with no PMHx who presented to [facility] on 11/17 with chest pain radiating to back/shoulders x 2 days. On arrival to ED, he was given ASA and Brilinta 180 mg and Plavix 300 mg. Troponin were elevated, patient was placed on heparin gtt and admitted to telemetry unit at [facility]. On 11/18, he had cardiac cath via RRA which demonstrated 3vCAD. Patient was transferred to [facility] under  for CABG. Patient currently undergoing PST and continues on titrating hep gtt.      A/P:  Neurovascular: No delirium. Pain well controlled with current regimen.  -PRN's.    Cardiovascular: Hemodynamically stable. HR controlled.  -3vCAD, pending CABG with  on 11/22/24      Respiratory: 02 Sat = 98% on RA.  -If on oxygen wean to RA from for O2 Sat > 93%.  -Encourage C+DB and Use of IS 10x / hr while awake.  -CXR.    GI: Stable.  -PPX.  -PO Diet.    Renal / :  -Monitor renal function: BUN 17, Cr 1.10   -Monitor I/O's.    Endocrine:    -A1c: 5.6%, no hx  -TSH: 5.600, follow up Free T3/T4    Hematologic:  -CBC: RBC 4.71, Hgb 14.4, Plt 261  -Coagulation Panel: PTT 52.9, PT 11.1, INR .95  -Trending P2Y12s: 211 today, will follow up AM P2Y12     ID:  -Tempature: afebrile  -CBC: WBC 12.40  -Observe for SIRS/Sepsis Syndrome.    Prophylaxis:  -DVT prophylaxis with 5000 SubQ Heparin q8h.  -SCD's    Disposition:  -ICU for frequent monitoring.   49M, current smoker (30 pack years), with no PMHx who presented to [facility] on 11/17 with chest pain radiating to back/shoulders x 2 days. On arrival to ED, he was given ASA and Brilinta 180 mg and Plavix 300 mg. Troponin were elevated, patient was placed on heparin gtt and admitted to telemetry unit at [facility]. On 11/18, he had cardiac cath via RRA which demonstrated 3vCAD. Patient was transferred to [facility] under  for CABG. Patient currently undergoing PST and continues on titrating hep gtt.      A/P:  Neurovascular: No delirium. Pain well controlled with current regimen.  -Pain: tylenol prn     Cardiovascular: Hemodynamically stable. HR controlled.  -3vCAD, pending CABG with  on 11/22/24  C/w Imdur, hep gtt, ASA daily, Lopressor BID, nitroglycerin prn, lipitor 80mg    Respiratory: 02 Sat = 98% on RA.  -Encourage C+DB and Use of IS 10x / hr while awake.  -CXR: no effusions, no ptx     GI: Stable.  -PPX: pantoprazole 40mg daily   -PO Diet.  -Bowel regimen: c/w miralax, senna, dulcolax     Renal / :  -Monitor renal function: BUN 17, Cr 1.10   -Monitor I/O's.    Endocrine:    -A1c: 5.6%, no hx  -TSH: 5.600, follow up Free T3/T4    Hematologic:  -CBC: RBC 4.71, Hgb 14.4, Plt 261  -Coagulation Panel: PTT 52.9, PT 11.1, INR .95  -Trending P2Y12s: 211 today, will follow up AM P2Y12   -On hep gtt for CAD, trending PTTs    ID:  -Tempature: afebrile  -CBC: WBC 12.40  -Observe for SIRS/Sepsis Syndrome.  -Leukocytosis: asymptomatic, afebrile, f/u 10PM repeat     Prophylaxis:  -DVT prophylaxis with 5000 SubQ Heparin q8h.  -SCD's    Disposition:  -ICU for frequent monitoring.

## 2024-11-20 NOTE — PROGRESS NOTE ADULT - SUBJECTIVE AND OBJECTIVE BOX
Received from ICU     Pre-op for CABG with  on 11/21/24.     SUBJECTIVE ASSESSMENT:  49y Male without complaints. Denies fever, chills, chest pain, sob, abd pain, n/v/d.       Vital Signs Last 24 Hrs  T(C): 36.5 (20 Nov 2024 12:34), Max: 36.7 (20 Nov 2024 09:57)  T(F): 97.7 (20 Nov 2024 12:34), Max: 98 (20 Nov 2024 09:57)  HR: 65 (20 Nov 2024 12:34) (53 - 65)  BP: 125/73 (20 Nov 2024 12:34) (115/63 - 137/73)  BP(mean): 99 (20 Nov 2024 08:32) (78 - 99)  RR: 16 (20 Nov 2024 12:34) (16 - 18)  SpO2: 96% (20 Nov 2024 12:34) (95% - 97%)    Parameters below as of 20 Nov 2024 12:34  Patient On (Oxygen Delivery Method): room air      I&O's Detail    19 Nov 2024 07:01  -  20 Nov 2024 07:00  --------------------------------------------------------  IN:    Heparin: 190 mL    Oral Fluid: 600 mL  Total IN: 790 mL    OUT:    Voided (mL): 400 mL  Total OUT: 400 mL    Total NET: 390 mL      20 Nov 2024 07:01  -  20 Nov 2024 16:31  --------------------------------------------------------  IN:    Heparin: 60 mL  Total IN: 60 mL    OUT:  Total OUT: 0 mL    Total NET: 60 mL      CHEST TUBE:  No  LU DRAIN:  No.  EPICARDIAL WIRES: No.  TIE DOWNS: No.  RAMIREZ: No.    PHYSICAL EXAM:  Appearance: No acute distress.  Neurologic: AAOx3, no AMS or focal deficits.  Responds appropriately to verbal and physical stimuli; exhibits purposeful movement in all extremities.  Cardiovascular: RRR, S1 S2. No m/r/g.  Respiratory: No acute respiratory distress. CTA b/l, no w/r/r.   Gastrointestinal:  Soft, non-tender, non-distended, + BS.	  Skin: No rashes. No ecchymoses. No cyanosis.  Extremities: Exhibits normal range of motion, no clubbing, cyanosis or edema.  Vascular: Peripheral pulses palpable 2+ bilaterally.    LABS:                        14.4   12.40 )-----------( 261      ( 20 Nov 2024 03:27 )             43.2   .    PT/INR - ( 20 Nov 2024 09:44 )   PT: 11.2 sec;   INR: 0.96          PTT - ( 20 Nov 2024 09:44 )  PTT:55.4 sec    11-20    138  |  103  |  17  ----------------------------<  101[H]  4.3   |  24  |  1.10    Ca    9.0      20 Nov 2024 03:27  Phos  4.1     11-20  Mg     2.1     11-20    TPro  6.9  /  Alb  4.0  /  TBili  0.2  /  DBili  x   /  AST  18  /  ALT  25  /  AlkPhos  76  11-19      Urinalysis Basic - ( 20 Nov 2024 03:27 )    Color: x / Appearance: x / SG: x / pH: x  Gluc: 101 mg/dL / Ketone: x  / Bili: x / Urobili: x   Blood: x / Protein: x / Nitrite: x   Leuk Esterase: x / RBC: x / WBC x   Sq Epi: x / Non Sq Epi: x / Bacteria: x        MEDICATIONS  (STANDING):  aspirin  chewable 81 milliGRAM(s) Oral daily  atorvastatin 80 milliGRAM(s) Oral at bedtime  heparin  Infusion 900 Unit(s)/Hr (10 mL/Hr) IV Continuous <Continuous>  influenza   Vaccine 0.5 milliLiter(s) IntraMuscular once  isosorbide   mononitrate ER Tablet (IMDUR) 30 milliGRAM(s) Oral daily  metoprolol tartrate 25 milliGRAM(s) Oral every 12 hours  pantoprazole    Tablet 40 milliGRAM(s) Oral before breakfast  polyethylene glycol 3350 17 Gram(s) Oral daily  senna 2 Tablet(s) Oral at bedtime  sodium chloride 0.9% lock flush 3 milliLiter(s) IV Push every 8 hours    MEDICATIONS  (PRN):  acetaminophen     Tablet .. 650 milliGRAM(s) Oral every 6 hours PRN Mild Pain (1 - 3)  nitroglycerin     SubLingual 0.4 milliGRAM(s) SubLingual every 5 minutes PRN Chest Pain        RADIOLOGY & ADDITIONAL TESTS:

## 2024-11-21 PROBLEM — Z00.00 ENCOUNTER FOR PREVENTIVE HEALTH EXAMINATION: Status: ACTIVE | Noted: 2024-11-21

## 2024-11-21 LAB
ANION GAP SERPL CALC-SCNC: 11 MMOL/L — SIGNIFICANT CHANGE UP (ref 5–17)
APTT BLD: 67.7 SEC — HIGH (ref 24.5–35.6)
APTT BLD: 68.6 SEC — HIGH (ref 24.5–35.6)
APTT BLD: 74.2 SEC — HIGH (ref 24.5–35.6)
BLD GP AB SCN SERPL QL: NEGATIVE — SIGNIFICANT CHANGE UP
BUN SERPL-MCNC: 19 MG/DL — SIGNIFICANT CHANGE UP (ref 7–23)
CALCIUM SERPL-MCNC: 9.2 MG/DL — SIGNIFICANT CHANGE UP (ref 8.4–10.5)
CHLORIDE SERPL-SCNC: 101 MMOL/L — SIGNIFICANT CHANGE UP (ref 96–108)
CO2 SERPL-SCNC: 25 MMOL/L — SIGNIFICANT CHANGE UP (ref 22–31)
CREAT SERPL-MCNC: 1.06 MG/DL — SIGNIFICANT CHANGE UP (ref 0.5–1.3)
EGFR: 86 ML/MIN/1.73M2 — SIGNIFICANT CHANGE UP
GLUCOSE SERPL-MCNC: 105 MG/DL — HIGH (ref 70–99)
HCT VFR BLD CALC: 43.5 % — SIGNIFICANT CHANGE UP (ref 39–50)
HGB BLD-MCNC: 14.6 G/DL — SIGNIFICANT CHANGE UP (ref 13–17)
INR BLD: 0.89 — SIGNIFICANT CHANGE UP (ref 0.85–1.16)
MAGNESIUM SERPL-MCNC: 2.2 MG/DL — SIGNIFICANT CHANGE UP (ref 1.6–2.6)
MCHC RBC-ENTMCNC: 30.4 PG — SIGNIFICANT CHANGE UP (ref 27–34)
MCHC RBC-ENTMCNC: 33.6 G/DL — SIGNIFICANT CHANGE UP (ref 32–36)
MCV RBC AUTO: 90.6 FL — SIGNIFICANT CHANGE UP (ref 80–100)
NRBC # BLD: 0 /100 WBCS — SIGNIFICANT CHANGE UP (ref 0–0)
PA ADP PRP-ACNC: 233 PRU — SIGNIFICANT CHANGE UP (ref 182–335)
PHOSPHATE SERPL-MCNC: 4 MG/DL — SIGNIFICANT CHANGE UP (ref 2.5–4.5)
PLATELET # BLD AUTO: 266 K/UL — SIGNIFICANT CHANGE UP (ref 150–400)
POTASSIUM SERPL-MCNC: 4.1 MMOL/L — SIGNIFICANT CHANGE UP (ref 3.5–5.3)
POTASSIUM SERPL-SCNC: 4.1 MMOL/L — SIGNIFICANT CHANGE UP (ref 3.5–5.3)
PROTHROM AB SERPL-ACNC: 10.4 SEC — SIGNIFICANT CHANGE UP (ref 9.9–13.4)
RBC # BLD: 4.8 M/UL — SIGNIFICANT CHANGE UP (ref 4.2–5.8)
RBC # FLD: 13.2 % — SIGNIFICANT CHANGE UP (ref 10.3–14.5)
RH IG SCN BLD-IMP: POSITIVE — SIGNIFICANT CHANGE UP
SODIUM SERPL-SCNC: 137 MMOL/L — SIGNIFICANT CHANGE UP (ref 135–145)
T3FREE SERPL-MCNC: 2.92 PG/ML — SIGNIFICANT CHANGE UP (ref 2–4.4)
T4 FREE SERPL-MCNC: 1.05 NG/DL — SIGNIFICANT CHANGE UP (ref 0.93–1.7)
TROPONIN T, HIGH SENSITIVITY RESULT: 68 NG/L — CRITICAL HIGH (ref 0–51)
WBC # BLD: 10.59 K/UL — HIGH (ref 3.8–10.5)
WBC # FLD AUTO: 10.59 K/UL — HIGH (ref 3.8–10.5)

## 2024-11-21 PROCEDURE — 99232 SBSQ HOSP IP/OBS MODERATE 35: CPT | Mod: 57

## 2024-11-21 RX ORDER — CHLORHEXIDINE GLUCONATE 1.2 MG/ML
12 RINSE ORAL ONCE
Refills: 0 | Status: COMPLETED | OUTPATIENT
Start: 2024-11-21 | End: 2024-11-22

## 2024-11-21 RX ORDER — MULTIVIT WITH MINERALS/LUTEIN
2000 TABLET ORAL ONCE
Refills: 0 | Status: COMPLETED | OUTPATIENT
Start: 2024-11-21 | End: 2024-11-21

## 2024-11-21 RX ORDER — ACETAMINOPHEN 500MG 500 MG/1
975 TABLET, COATED ORAL ONCE
Refills: 0 | Status: COMPLETED | OUTPATIENT
Start: 2024-11-22 | End: 2024-11-22

## 2024-11-21 RX ORDER — CHLORHEXIDINE GLUCONATE 1.2 MG/ML
1 RINSE ORAL ONCE
Refills: 0 | Status: DISCONTINUED | OUTPATIENT
Start: 2024-11-21 | End: 2024-11-22

## 2024-11-21 RX ORDER — CHLORHEXIDINE GLUCONATE 1.2 MG/ML
1 RINSE ORAL ONCE
Refills: 0 | Status: COMPLETED | OUTPATIENT
Start: 2024-11-21 | End: 2024-11-21

## 2024-11-21 RX ADMIN — ACETAMINOPHEN 500MG 650 MILLIGRAM(S): 500 TABLET, COATED ORAL at 14:58

## 2024-11-21 RX ADMIN — Medication 5 MILLIGRAM(S): at 21:43

## 2024-11-21 RX ADMIN — SODIUM CHLORIDE 3 MILLILITER(S): 9 INJECTION, SOLUTION INTRAMUSCULAR; INTRAVENOUS; SUBCUTANEOUS at 22:00

## 2024-11-21 RX ADMIN — Medication 30 MILLIGRAM(S): at 11:23

## 2024-11-21 RX ADMIN — SODIUM CHLORIDE 3 MILLILITER(S): 9 INJECTION, SOLUTION INTRAMUSCULAR; INTRAVENOUS; SUBCUTANEOUS at 05:06

## 2024-11-21 RX ADMIN — Medication 81 MILLIGRAM(S): at 11:23

## 2024-11-21 RX ADMIN — PANTOPRAZOLE SODIUM 40 MILLIGRAM(S): 40 TABLET, DELAYED RELEASE ORAL at 06:55

## 2024-11-21 RX ADMIN — SODIUM CHLORIDE 3 MILLILITER(S): 9 INJECTION, SOLUTION INTRAMUSCULAR; INTRAVENOUS; SUBCUTANEOUS at 11:29

## 2024-11-21 RX ADMIN — Medication 12.5 UNIT(S)/HR: at 14:59

## 2024-11-21 RX ADMIN — Medication 80 MILLIGRAM(S): at 21:43

## 2024-11-21 RX ADMIN — CHLORHEXIDINE GLUCONATE 1 APPLICATION(S): 1.2 RINSE ORAL at 19:45

## 2024-11-21 RX ADMIN — Medication 2000 MILLIGRAM(S): at 21:43

## 2024-11-21 RX ADMIN — ACETAMINOPHEN 500MG 650 MILLIGRAM(S): 500 TABLET, COATED ORAL at 19:30

## 2024-11-21 RX ADMIN — Medication 2 TABLET(S): at 21:43

## 2024-11-21 RX ADMIN — METOPROLOL TARTRATE 25 MILLIGRAM(S): 100 TABLET, FILM COATED ORAL at 17:17

## 2024-11-21 RX ADMIN — ACETAMINOPHEN 500MG 650 MILLIGRAM(S): 500 TABLET, COATED ORAL at 23:31

## 2024-11-21 RX ADMIN — CHLORHEXIDINE GLUCONATE 1 APPLICATION(S): 1.2 RINSE ORAL at 21:46

## 2024-11-21 NOTE — DIETITIAN INITIAL EVALUATION ADULT - NS FNS DIET ORDER
Diet, NPO after Midnight:      NPO Start Date: 21-Nov-2024,   NPO Start Time: 23:59  Except Medications     Special Instructions for Nursing:  Except Medications (11-21-24 @ 09:07)

## 2024-11-21 NOTE — PROGRESS NOTE ADULT - ASSESSMENT
49M, current smoker (30 pack years), with no PMHx who presented to [facility] on 11/17 with chest pain radiating to back/shoulders x 2 days. On arrival to ED, he was given ASA and Brilinta 180 mg and Plavix 300 mg. Troponin were elevated, patient was placed on heparin gtt and admitted to telemetry unit at [facility]. On 11/18, he had cardiac cath via RRA which demonstrated 3vCAD. Patient was transferred to [facility] under  for CABG. Patient currently undergoing PST and continues on titrating hep gtt.      A/P:  Neurovascular: No delirium. Pain well controlled with current regimen.  -Pain: tylenol prn     Cardiovascular: Hemodynamically stable. HR controlled.  -3vCAD, pending CABG with  on 11/22/24  C/w Imdur, hep gtt, ASA daily, Lopressor BID, nitroglycerin prn, lipitor 80mg    Respiratory: 02 Sat = 98% on RA.  -Encourage C+DB and Use of IS 10x / hr while awake.  -CXR: no effusions, no ptx     GI: Stable.  -PPX: pantoprazole 40mg daily   -PO Diet.  -Bowel regimen: c/w miralax, senna, dulcolax     Renal / :  -Monitor renal function: BUN 17, Cr 1.10   -Monitor I/O's.    Endocrine:    -A1c: 5.6%, no hx  -TSH: 5.600, follow up Free T3/T4    Hematologic:  -CBC: RBC 4.71, Hgb 14.4, Plt 261  -Coagulation Panel: PTT 52.9, PT 11.1, INR .95  -Trending P2Y12s: 211 today, will follow up AM P2Y12   -On hep gtt for CAD, trending PTTs    ID:  -Tempature: afebrile  -CBC: WBC 12.40  -Observe for SIRS/Sepsis Syndrome.  -Leukocytosis: asymptomatic, afebrile, f/u 10PM repeat     Prophylaxis:  -DVT prophylaxis with 5000 SubQ Heparin q8h.  -SCD's    Disposition:  -Telemetry, pending CABG with  tomorrow 11/22/24. Patient will be NPO at midnight, continues on hep gtt.  49M, current smoker (30 pack years), with no PMHx who presented to [facility] on 11/17 with chest pain radiating to back/shoulders x 2 days. On arrival to ED, he was given ASA and Brilinta 180 mg and Plavix 300 mg. Troponin were elevated, patient was placed on heparin gtt and admitted to telemetry unit at [facility]. On 11/18, he had cardiac cath via RRA which demonstrated 3vCAD. Patient was transferred to [facility] under  for CABG. Patient has completed PST, continued on hep gtt for CAD and planned for CABG tomorrow (11/22/24) with .     A/P:  Neurovascular: No delirium. Pain well controlled with current regimen.  -Pain: tylenol prn     Cardiovascular: Hemodynamically stable. HR controlled.  -3vCAD, pending CABG with  on 11/22/24  C/w Imdur 30mg, hep gtt, ASA daily, Lopressor BID, nitroglycerin prn, lipitor 80mg    Respiratory: 02 Sat = 98% on RA.  -Encourage C+DB and Use of IS 10x / hr while awake.  -CXR: no effusions, no ptx     GI: Stable.  -PPX: pantoprazole 40mg daily   -PO Diet.  -Bowel regimen: c/w miralax, senna, dulcolax     Renal / :  -Monitor renal function: BUN 19, Cr 1.06  -Monitor I/O's.    Endocrine:    -A1c: 5.6%, no hx  -TSH: 5.600, Free T3 sent, Free T4 1.050  Discussed with Dr. Rdz, seems consistent with subclinical hypothyroidism, no need to start replacement.     Hematologic:  -CBC: RBC 4.80, Hgb 14.6, Plt 266  -Coagulation Panel: PTT 74.2, PT 10.4, INR .89  -Trending P2Y12s: 233 today, will follow up AM P2Y12   -On hep gtt for CAD, trending PTTs    ID:  -Tempature: afebrile  -CBC: WBC 10.59  -Observe for SIRS/Sepsis Syndrome.  -Leukocytosis: asymptomatic, afebrile, f/u 10PM repeat     Prophylaxis:  -DVT prophylaxis with titrating hep gtt   -SCD's    Disposition:  -Telemetry, pending CABG with  tomorrow 11/22/24. Patient will be NPO at midnight, continues on hep gtt.

## 2024-11-21 NOTE — DIETITIAN INITIAL EVALUATION ADULT - PERTINENT LABORATORY DATA
11-21    137  |  101  |  19  ----------------------------<  105[H]  4.1   |  25  |  1.06    Ca    9.2      21 Nov 2024 05:30  Phos  4.0     11-21  Mg     2.2     11-21    A1C with Estimated Average Glucose Result: 5.6 % (11-18-24 @ 18:52)  A1C with Estimated Average Glucose Result: 5.7 % (11-18-24 @ 04:50)

## 2024-11-21 NOTE — DIETITIAN INITIAL EVALUATION ADULT - PERTINENT MEDS FT
MEDICATIONS  (STANDING):  ascorbic acid 2000 milliGRAM(s) Oral once  aspirin  chewable 81 milliGRAM(s) Oral daily  atorvastatin 80 milliGRAM(s) Oral at bedtime  bisacodyl 5 milliGRAM(s) Oral at bedtime  chlorhexidine 0.12% Liquid 12 milliLiter(s) Swish and Spit once  chlorhexidine 4% Liquid 1 Application(s) Topical once  chlorhexidine 4% Liquid 1 Application(s) Topical once  heparin  Infusion 1050 Unit(s)/Hr (12.5 mL/Hr) IV Continuous <Continuous>  influenza   Vaccine 0.5 milliLiter(s) IntraMuscular once  isosorbide   mononitrate ER Tablet (IMDUR) 30 milliGRAM(s) Oral daily  metoprolol tartrate 25 milliGRAM(s) Oral every 12 hours  pantoprazole    Tablet 40 milliGRAM(s) Oral before breakfast  polyethylene glycol 3350 17 Gram(s) Oral daily  senna 2 Tablet(s) Oral at bedtime  sodium chloride 0.9% lock flush 3 milliLiter(s) IV Push every 8 hours    MEDICATIONS  (PRN):  acetaminophen     Tablet .. 650 milliGRAM(s) Oral every 6 hours PRN Mild Pain (1 - 3)  nitroglycerin     SubLingual 0.4 milliGRAM(s) SubLingual every 5 minutes PRN Chest Pain

## 2024-11-21 NOTE — PROGRESS NOTE ADULT - SUBJECTIVE AND OBJECTIVE BOX
Planned Date of Surgery: 11/22/24                                                                                                             Surgeon:     Procedure: CABG    HPI:  49y Male without complaints. Denies fever, chills, chest pain, sob, cough, recent illness, abd pain, n/v/d.     PAST MEDICAL & SURGICAL HISTORY:  No pertinent past medical history  Back pain    No Known Allergies    MEDICATIONS  (STANDING):  ascorbic acid 2000 milliGRAM(s) Oral once  aspirin  chewable 81 milliGRAM(s) Oral daily  atorvastatin 80 milliGRAM(s) Oral at bedtime  bisacodyl 5 milliGRAM(s) Oral at bedtime  chlorhexidine 0.12% Liquid 12 milliLiter(s) Swish and Spit once  chlorhexidine 4% Liquid 1 Application(s) Topical once  chlorhexidine 4% Liquid 1 Application(s) Topical once  chlorhexidine 4% Liquid 1 Application(s) Topical once  heparin  Infusion 1050 Unit(s)/Hr (12.5 mL/Hr) IV Continuous <Continuous>  influenza   Vaccine 0.5 milliLiter(s) IntraMuscular once  isosorbide   mononitrate ER Tablet (IMDUR) 30 milliGRAM(s) Oral daily  metoprolol tartrate 25 milliGRAM(s) Oral every 12 hours  pantoprazole    Tablet 40 milliGRAM(s) Oral before breakfast  polyethylene glycol 3350 17 Gram(s) Oral daily  senna 2 Tablet(s) Oral at bedtime  sodium chloride 0.9% lock flush 3 milliLiter(s) IV Push every 8 hours    MEDICATIONS  (PRN):  acetaminophen     Tablet .. 650 milliGRAM(s) Oral every 6 hours PRN Mild Pain (1 - 3)  nitroglycerin     SubLingual 0.4 milliGRAM(s) SubLingual every 5 minutes PRN Chest Pain      On Beta Blocker? YES      Physical Exam:  T(C): 36.2 (11-21-24 @ 09:15), Max: 36.7 (11-20-24 @ 17:25)  HR: 66 (11-21-24 @ 12:59) (56 - 70)  BP: 122/63 (11-21-24 @ 12:59) (111/74 - 131/82)  RR: 17 (11-21-24 @ 12:59) (16 - 18)  SpO2: 96% (11-21-24 @ 12:59) (95% - 98%)    Appearance: No acute distress.  Neurologic: AAOx3, no AMS or focal deficits.  Responds appropriately to verbal and physical stimuli; exhibits purposeful movement in all extremities.  Cardiovascular: RRR, S1 S2. No m/r/g.  Respiratory: No acute respiratory distress. CTA b/l, no w/r/r.   Gastrointestinal:  Soft, non-tender, non-distended, + BS.	  Skin: No rashes. No ecchymoses. No cyanosis.  Extremities: Exhibits normal range of motion, no clubbing, cyanosis or edema.  Vascular: Peripheral pulses palpable 2+ bilaterally.     Labs:                        14.6   10.59 )-----------( 266      ( 21 Nov 2024 05:30 )             43.5     11-21    137  |  101  |  19  ----------------------------<  105[H]  4.1   |  25  |  1.06    Ca    9.2      21 Nov 2024 05:30  Phos  4.0     11-21  Mg     2.2     11-21      PT/INR - ( 21 Nov 2024 05:30 )   PT: 10.4 sec;   INR: 0.89          PTT - ( 21 Nov 2024 12:22 )  PTT:68.6 sec  Urinalysis Basic - ( 21 Nov 2024 05:30 )    Color: x / Appearance: x / SG: x / pH: x  Gluc: 105 mg/dL / Ketone: x  / Bili: x / Urobili: x   Blood: x / Protein: x / Nitrite: x   Leuk Esterase: x / RBC: x / WBC x   Sq Epi: x / Non Sq Epi: x / Bacteria: x      ABG - ( 20 Nov 2024 17:23 )  pH, Arterial: 7.41  pH, Blood: x     /  pCO2: 35    /  pO2: 97    / HCO3: 22    / Base Excess: -1.9  /  SaO2: 98.5                      Hgb A1C: 5.6%    EKG: in chart    CXR: WNL    CT Scans:  CT Angio Abdomen and Pelvis w/ IV Cont (11.17.24 @ 16:42) >  - No acute aortic dissection within the limitations of this non-ECG gated   study. Ectasia of the infrarenal abdominal aorta, measuring 2.8 cm, with   small focus of pedunculated plaque. Additional multifocal aortoiliac   plaque as described above.  - Multivessel coronary artery calcifications. Recommend cardiology   evaluation.  - Enlarged liver with diffuse steatosis and morphologic features of early   cirrhosis. Right hepatic lobe 1 cm hypervascular focus on image 97 series   204 which is indeterminate. Contrast-enhanced abdominal MRI is   recommended for characterization.  - Sub-4 mm pulmonary nodules can be reevaluated with subsequent chest CT in   12 months, or aspiration risk factors.    Cath Report:  in chart    Echo:  TTE Echo Complete w/o Contrast w/ Doppler (11.19.24 @ 09:24) >  CONCLUSIONS:   1. Basal and mid inferior septum and basal inferior segment are   hypokinetic.   2. Other walls contract normally. LVEF is preserved.   3. Normal right ventricular size and systolic function.   4. Normal atria.   5. No significant valvular disease.   6. No pericardial effusion.    PFT's:  in chart    Carotid Duplex:  No stenosis     Consent in Chart? YES  Pre-op Orders Placed? YES  Blood Prodeucts Ordered? YES  NPO ordered? YES       performed the STS risk calculator and quoted a 3.84% operative mortality and complication risk and discussed the STS risk factors with the  patient including but not limited to death, heart attack, bleeding, stroke, kidney problems and infection. Dr. Dyson feels the patient will benefit  and  is a candidate for a CABG. All questions were addressed and patient agrees to proceed with surgery.

## 2024-11-21 NOTE — DIETITIAN INITIAL EVALUATION ADULT - ADD RECOMMEND
1. Continue NPO until medically feasible to advance diet to DASH/TLC diet  2. Follow intake closely, adjust prn  3. Monitor electrolytes, adjust and replete prn  4. Pain and bowel regimen per team   5. RD diet edu prn

## 2024-11-21 NOTE — DIETITIAN INITIAL EVALUATION ADULT - OTHER INFO
49M, current smoker (30 pack years), with no PMHx who presented to [facility] on 11/17 with chest pain radiating to back/shoulders x 2 days. On arrival to ED, he was given ASA and Brilinta 180 mg and Plavix 300 mg. Troponin were elevated, patient was placed on heparin gtt and admitted to telemetry unit at [facility]. On 11/18, he had cardiac cath via RRA which demonstrated 3vCAD. Patient was transferred to [facility] under  for CABG. Patient has completed PST, continued on hep gtt for CAD and planned for CABG tomorrow (11/22/24) with Dr. Dyson.     Pt NPO for OR tomorrow. No reports of weight or intake changes PTA.  49M, current smoker (30 pack years), with no PMHx who presented to [facility] on 11/17 with chest pain radiating to back/shoulders x 2 days. On arrival to ED, he was given ASA and Brilinta 180 mg and Plavix 300 mg. Troponin were elevated, patient was placed on heparin gtt and admitted to telemetry unit at [facility]. On 11/18, he had cardiac cath via RRA which demonstrated 3vCAD. Patient was transferred to [facility] under  for CABG. Patient has completed PST, continued on hep gtt for CAD and planned for CABG tomorrow (11/22/24) with Dr. Dyson.     Pt NPO for OR tomorrow. No reports of weight or intake changes PTA. Prior to NPO, pt with decreased PO intake at baseline. Reviewed eventual nutrition goals, increased needs post surgery. Intermittent pain noted. Noah score 21. RD to follow, nutrition recommendations below.

## 2024-11-21 NOTE — DIETITIAN INITIAL EVALUATION ADULT - OTHER CALCULATIONS
Estimated needs based on ABW; calorie/protein needs adjusted for BMI >30, clinical course, increased demands post surgery; fluid per team

## 2024-11-22 ENCOUNTER — APPOINTMENT (OUTPATIENT)
Dept: CARDIOTHORACIC SURGERY | Facility: HOSPITAL | Age: 49
End: 2024-11-22

## 2024-11-22 ENCOUNTER — TRANSCRIPTION ENCOUNTER (OUTPATIENT)
Age: 49
End: 2024-11-22

## 2024-11-22 LAB
ALBUMIN SERPL ELPH-MCNC: 3.5 G/DL — SIGNIFICANT CHANGE UP (ref 3.3–5)
ALBUMIN SERPL ELPH-MCNC: 3.7 G/DL — SIGNIFICANT CHANGE UP (ref 3.3–5)
ALBUMIN SERPL ELPH-MCNC: 4.5 G/DL — SIGNIFICANT CHANGE UP (ref 3.3–5)
ALP SERPL-CCNC: 45 U/L — SIGNIFICANT CHANGE UP (ref 40–120)
ALP SERPL-CCNC: 47 U/L — SIGNIFICANT CHANGE UP (ref 40–120)
ALP SERPL-CCNC: 86 U/L — SIGNIFICANT CHANGE UP (ref 40–120)
ALT FLD-CCNC: 118 U/L — HIGH (ref 10–45)
ALT FLD-CCNC: 65 U/L — HIGH (ref 10–45)
ALT FLD-CCNC: 69 U/L — HIGH (ref 10–45)
ANION GAP SERPL CALC-SCNC: 12 MMOL/L — SIGNIFICANT CHANGE UP (ref 5–17)
ANION GAP SERPL CALC-SCNC: 13 MMOL/L — SIGNIFICANT CHANGE UP (ref 5–17)
ANION GAP SERPL CALC-SCNC: 9 MMOL/L — SIGNIFICANT CHANGE UP (ref 5–17)
APTT BLD: 31.3 SEC — SIGNIFICANT CHANGE UP (ref 24.5–35.6)
APTT BLD: 41.4 SEC — HIGH (ref 24.5–35.6)
APTT BLD: 74.2 SEC — HIGH (ref 24.5–35.6)
AST SERPL-CCNC: 125 U/L — HIGH (ref 10–40)
AST SERPL-CCNC: 72 U/L — HIGH (ref 10–40)
AST SERPL-CCNC: SIGNIFICANT CHANGE UP (ref 10–40)
BASOPHILS # BLD AUTO: 0.02 K/UL — SIGNIFICANT CHANGE UP (ref 0–0.2)
BASOPHILS # BLD AUTO: 0.07 K/UL — SIGNIFICANT CHANGE UP (ref 0–0.2)
BASOPHILS NFR BLD AUTO: 0.1 % — SIGNIFICANT CHANGE UP (ref 0–2)
BASOPHILS NFR BLD AUTO: 0.6 % — SIGNIFICANT CHANGE UP (ref 0–2)
BILIRUB SERPL-MCNC: 0.3 MG/DL — SIGNIFICANT CHANGE UP (ref 0.2–1.2)
BILIRUB SERPL-MCNC: 0.5 MG/DL — SIGNIFICANT CHANGE UP (ref 0.2–1.2)
BILIRUB SERPL-MCNC: 0.5 MG/DL — SIGNIFICANT CHANGE UP (ref 0.2–1.2)
BLD GP AB SCN SERPL QL: NEGATIVE — SIGNIFICANT CHANGE UP
BUN SERPL-MCNC: 13 MG/DL — SIGNIFICANT CHANGE UP (ref 7–23)
BUN SERPL-MCNC: 14 MG/DL — SIGNIFICANT CHANGE UP (ref 7–23)
BUN SERPL-MCNC: 20 MG/DL — SIGNIFICANT CHANGE UP (ref 7–23)
CALCIUM SERPL-MCNC: 8.3 MG/DL — LOW (ref 8.4–10.5)
CALCIUM SERPL-MCNC: 8.8 MG/DL — SIGNIFICANT CHANGE UP (ref 8.4–10.5)
CALCIUM SERPL-MCNC: 9.1 MG/DL — SIGNIFICANT CHANGE UP (ref 8.4–10.5)
CHLORIDE SERPL-SCNC: 100 MMOL/L — SIGNIFICANT CHANGE UP (ref 96–108)
CHLORIDE SERPL-SCNC: 104 MMOL/L — SIGNIFICANT CHANGE UP (ref 96–108)
CHLORIDE SERPL-SCNC: 104 MMOL/L — SIGNIFICANT CHANGE UP (ref 96–108)
CO2 SERPL-SCNC: 22 MMOL/L — SIGNIFICANT CHANGE UP (ref 22–31)
CO2 SERPL-SCNC: 26 MMOL/L — SIGNIFICANT CHANGE UP (ref 22–31)
CO2 SERPL-SCNC: 27 MMOL/L — SIGNIFICANT CHANGE UP (ref 22–31)
CREAT SERPL-MCNC: 1.14 MG/DL — SIGNIFICANT CHANGE UP (ref 0.5–1.3)
CREAT SERPL-MCNC: 1.2 MG/DL — SIGNIFICANT CHANGE UP (ref 0.5–1.3)
CREAT SERPL-MCNC: 1.2 MG/DL — SIGNIFICANT CHANGE UP (ref 0.5–1.3)
EGFR: 74 ML/MIN/1.73M2 — SIGNIFICANT CHANGE UP
EGFR: 74 ML/MIN/1.73M2 — SIGNIFICANT CHANGE UP
EGFR: 79 ML/MIN/1.73M2 — SIGNIFICANT CHANGE UP
EOSINOPHIL # BLD AUTO: 0.02 K/UL — SIGNIFICANT CHANGE UP (ref 0–0.5)
EOSINOPHIL # BLD AUTO: 0.36 K/UL — SIGNIFICANT CHANGE UP (ref 0–0.5)
EOSINOPHIL NFR BLD AUTO: 0.1 % — SIGNIFICANT CHANGE UP (ref 0–6)
EOSINOPHIL NFR BLD AUTO: 3.3 % — SIGNIFICANT CHANGE UP (ref 0–6)
GAS PNL BLDA: SIGNIFICANT CHANGE UP
GLUCOSE SERPL-MCNC: 120 MG/DL — HIGH (ref 70–99)
GLUCOSE SERPL-MCNC: 134 MG/DL — HIGH (ref 70–99)
GLUCOSE SERPL-MCNC: 97 MG/DL — SIGNIFICANT CHANGE UP (ref 70–99)
HCT VFR BLD CALC: 27.9 % — LOW (ref 39–50)
HCT VFR BLD CALC: 28.1 % — LOW (ref 39–50)
HCT VFR BLD CALC: 43.6 % — SIGNIFICANT CHANGE UP (ref 39–50)
HGB BLD-MCNC: 14.6 G/DL — SIGNIFICANT CHANGE UP (ref 13–17)
HGB BLD-MCNC: 9.4 G/DL — LOW (ref 13–17)
HGB BLD-MCNC: 9.5 G/DL — LOW (ref 13–17)
IMM GRANULOCYTES NFR BLD AUTO: 1 % — HIGH (ref 0–0.9)
IMM GRANULOCYTES NFR BLD AUTO: 1.4 % — HIGH (ref 0–0.9)
INR BLD: 0.9 — SIGNIFICANT CHANGE UP (ref 0.85–1.16)
INR BLD: 1.11 — SIGNIFICANT CHANGE UP (ref 0.85–1.16)
INR BLD: 1.18 — HIGH (ref 0.85–1.16)
LACTATE SERPL-SCNC: 1.5 MMOL/L — SIGNIFICANT CHANGE UP (ref 0.5–2)
LYMPHOCYTES # BLD AUTO: 1.66 K/UL — SIGNIFICANT CHANGE UP (ref 1–3.3)
LYMPHOCYTES # BLD AUTO: 11.7 % — LOW (ref 13–44)
LYMPHOCYTES # BLD AUTO: 39.6 % — SIGNIFICANT CHANGE UP (ref 13–44)
LYMPHOCYTES # BLD AUTO: 4.31 K/UL — HIGH (ref 1–3.3)
MAGNESIUM SERPL-MCNC: 2 MG/DL — SIGNIFICANT CHANGE UP (ref 1.6–2.6)
MAGNESIUM SERPL-MCNC: 2.1 MG/DL — SIGNIFICANT CHANGE UP (ref 1.6–2.6)
MCHC RBC-ENTMCNC: 29.7 PG — SIGNIFICANT CHANGE UP (ref 27–34)
MCHC RBC-ENTMCNC: 29.9 PG — SIGNIFICANT CHANGE UP (ref 27–34)
MCHC RBC-ENTMCNC: 31 PG — SIGNIFICANT CHANGE UP (ref 27–34)
MCHC RBC-ENTMCNC: 33.5 G/DL — SIGNIFICANT CHANGE UP (ref 32–36)
MCHC RBC-ENTMCNC: 33.7 G/DL — SIGNIFICANT CHANGE UP (ref 32–36)
MCHC RBC-ENTMCNC: 33.8 G/DL — SIGNIFICANT CHANGE UP (ref 32–36)
MCV RBC AUTO: 88.3 FL — SIGNIFICANT CHANGE UP (ref 80–100)
MCV RBC AUTO: 88.4 FL — SIGNIFICANT CHANGE UP (ref 80–100)
MCV RBC AUTO: 92.6 FL — SIGNIFICANT CHANGE UP (ref 80–100)
MONOCYTES # BLD AUTO: 0.7 K/UL — SIGNIFICANT CHANGE UP (ref 0–0.9)
MONOCYTES # BLD AUTO: 1.17 K/UL — HIGH (ref 0–0.9)
MONOCYTES NFR BLD AUTO: 6.4 % — SIGNIFICANT CHANGE UP (ref 2–14)
MONOCYTES NFR BLD AUTO: 8.2 % — SIGNIFICANT CHANGE UP (ref 2–14)
NEUTROPHILS # BLD AUTO: 11.14 K/UL — HIGH (ref 1.8–7.4)
NEUTROPHILS # BLD AUTO: 5.34 K/UL — SIGNIFICANT CHANGE UP (ref 1.8–7.4)
NEUTROPHILS NFR BLD AUTO: 49.1 % — SIGNIFICANT CHANGE UP (ref 43–77)
NEUTROPHILS NFR BLD AUTO: 78.5 % — HIGH (ref 43–77)
NRBC # BLD: 0 /100 WBCS — SIGNIFICANT CHANGE UP (ref 0–0)
PHOSPHATE SERPL-MCNC: 4.1 MG/DL — SIGNIFICANT CHANGE UP (ref 2.5–4.5)
PLATELET # BLD AUTO: 134 K/UL — LOW (ref 150–400)
PLATELET # BLD AUTO: 162 K/UL — SIGNIFICANT CHANGE UP (ref 150–400)
PLATELET # BLD AUTO: 286 K/UL — SIGNIFICANT CHANGE UP (ref 150–400)
POTASSIUM SERPL-MCNC: 3.8 MMOL/L — SIGNIFICANT CHANGE UP (ref 3.5–5.3)
POTASSIUM SERPL-MCNC: 4.6 MMOL/L — SIGNIFICANT CHANGE UP (ref 3.5–5.3)
POTASSIUM SERPL-MCNC: 5 MMOL/L — SIGNIFICANT CHANGE UP (ref 3.5–5.3)
POTASSIUM SERPL-SCNC: 3.8 MMOL/L — SIGNIFICANT CHANGE UP (ref 3.5–5.3)
POTASSIUM SERPL-SCNC: 4.6 MMOL/L — SIGNIFICANT CHANGE UP (ref 3.5–5.3)
POTASSIUM SERPL-SCNC: 5 MMOL/L — SIGNIFICANT CHANGE UP (ref 3.5–5.3)
PROT SERPL-MCNC: 5.3 G/DL — LOW (ref 6–8.3)
PROT SERPL-MCNC: 5.3 G/DL — LOW (ref 6–8.3)
PROT SERPL-MCNC: 7.6 G/DL — SIGNIFICANT CHANGE UP (ref 6–8.3)
PROTHROM AB SERPL-ACNC: 10.4 SEC — SIGNIFICANT CHANGE UP (ref 9.9–13.4)
PROTHROM AB SERPL-ACNC: 13 SEC — SIGNIFICANT CHANGE UP (ref 9.9–13.4)
PROTHROM AB SERPL-ACNC: 13.8 SEC — HIGH (ref 9.9–13.4)
RBC # BLD: 3.16 M/UL — LOW (ref 4.2–5.8)
RBC # BLD: 3.18 M/UL — LOW (ref 4.2–5.8)
RBC # BLD: 4.71 M/UL — SIGNIFICANT CHANGE UP (ref 4.2–5.8)
RBC # FLD: 13.1 % — SIGNIFICANT CHANGE UP (ref 10.3–14.5)
RBC # FLD: 13.2 % — SIGNIFICANT CHANGE UP (ref 10.3–14.5)
RBC # FLD: 13.2 % — SIGNIFICANT CHANGE UP (ref 10.3–14.5)
RH IG SCN BLD-IMP: POSITIVE — SIGNIFICANT CHANGE UP
SODIUM SERPL-SCNC: 135 MMOL/L — SIGNIFICANT CHANGE UP (ref 135–145)
SODIUM SERPL-SCNC: 140 MMOL/L — SIGNIFICANT CHANGE UP (ref 135–145)
SODIUM SERPL-SCNC: 142 MMOL/L — SIGNIFICANT CHANGE UP (ref 135–145)
WBC # BLD: 10.89 K/UL — HIGH (ref 3.8–10.5)
WBC # BLD: 14.21 K/UL — HIGH (ref 3.8–10.5)
WBC # BLD: 14.83 K/UL — HIGH (ref 3.8–10.5)
WBC # FLD AUTO: 10.89 K/UL — HIGH (ref 3.8–10.5)
WBC # FLD AUTO: 14.21 K/UL — HIGH (ref 3.8–10.5)
WBC # FLD AUTO: 14.83 K/UL — HIGH (ref 3.8–10.5)

## 2024-11-22 PROCEDURE — 33517 CABG ARTERY-VEIN SINGLE: CPT | Mod: AS

## 2024-11-22 PROCEDURE — 33517 CABG ARTERY-VEIN SINGLE: CPT

## 2024-11-22 PROCEDURE — 25020 DECOMPRESS FOREARM 1 SPACE: CPT | Mod: GC,RT,59

## 2024-11-22 PROCEDURE — 99292 CRITICAL CARE ADDL 30 MIN: CPT

## 2024-11-22 PROCEDURE — 35600 OPEN HRV UXTR ART 1 SGM CAB: CPT | Mod: AS

## 2024-11-22 PROCEDURE — 97605 NEG PRS WND THER DME<=50SQCM: CPT | Mod: GC,59

## 2024-11-22 PROCEDURE — 33535 CABG ARTERIAL THREE: CPT | Mod: AS

## 2024-11-22 PROCEDURE — 71045 X-RAY EXAM CHEST 1 VIEW: CPT | Mod: 26

## 2024-11-22 PROCEDURE — 99291 CRITICAL CARE FIRST HOUR: CPT

## 2024-11-22 PROCEDURE — 33508 ENDOSCOPIC VEIN HARVEST: CPT | Mod: AS,59

## 2024-11-22 PROCEDURE — 93010 ELECTROCARDIOGRAM REPORT: CPT

## 2024-11-22 PROCEDURE — 25028 I&D F/ARM&/WRST DP ABSC/HMTM: CPT | Mod: GC,RT

## 2024-11-22 PROCEDURE — 33535 CABG ARTERIAL THREE: CPT

## 2024-11-22 DEVICE — SURGICEL FIBRILLAR 4 X 4": Type: IMPLANTABLE DEVICE | Status: FUNCTIONAL

## 2024-11-22 DEVICE — LIGATING CLIPS WECK HORIZON MEDIUM (BLUE) 24: Type: IMPLANTABLE DEVICE | Status: FUNCTIONAL

## 2024-11-22 DEVICE — CANNULA VENOUS 3 STAGE STANDARD 29/37/37FR X 1/2": Type: IMPLANTABLE DEVICE | Status: FUNCTIONAL

## 2024-11-22 DEVICE — CATH VENT LEFT HEART SILICONE 20FR NON-VENTED: Type: IMPLANTABLE DEVICE | Status: FUNCTIONAL

## 2024-11-22 DEVICE — PACING WIRE STREAMLINE BIPOLAR MYOCARDIAL: Type: IMPLANTABLE DEVICE | Status: FUNCTIONAL

## 2024-11-22 DEVICE — KIT CVC 3LUM SPECTRUM 9FR: Type: IMPLANTABLE DEVICE | Status: FUNCTIONAL

## 2024-11-22 DEVICE — CANNULA ARTERIOTOMY 2MM X 1.3": Type: IMPLANTABLE DEVICE | Status: FUNCTIONAL

## 2024-11-22 DEVICE — SHEATH INTRODUCER TERUMO PINNACLE CORONARY 6FR X 10CM X 0.038" MINI WIRE: Type: IMPLANTABLE DEVICE | Status: FUNCTIONAL

## 2024-11-22 DEVICE — CLIP APPLIER ETHICON LIGACLIP 9 3/8" SMALL: Type: IMPLANTABLE DEVICE | Status: FUNCTIONAL

## 2024-11-22 DEVICE — CATH SILICONE THORACIC STR 24FR 20/BX: Type: IMPLANTABLE DEVICE | Status: FUNCTIONAL

## 2024-11-22 DEVICE — INTRO MICROPUNC 4FRX10CM SS: Type: IMPLANTABLE DEVICE | Status: FUNCTIONAL

## 2024-11-22 DEVICE — CHEST DRAIN THORACIC PVC 32FR: Type: IMPLANTABLE DEVICE | Status: FUNCTIONAL

## 2024-11-22 DEVICE — LIGATING CLIPS WECK HORIZON SMALL (YELLOW) 24: Type: IMPLANTABLE DEVICE | Status: FUNCTIONAL

## 2024-11-22 DEVICE — INTRODUCER MICROPUNCTURE STIFF 4FR X 10CM: Type: IMPLANTABLE DEVICE | Status: FUNCTIONAL

## 2024-11-22 DEVICE — SHEATH INTRODUCER TERUMO PINNACLE PERIPHERAL 4FR X 10CM X 0.035" MINI WIRE: Type: IMPLANTABLE DEVICE | Status: FUNCTIONAL

## 2024-11-22 RX ORDER — CHLORHEXIDINE GLUCONATE 1.2 MG/ML
1 RINSE ORAL DAILY
Refills: 0 | Status: DISCONTINUED | OUTPATIENT
Start: 2024-11-22 | End: 2024-11-27

## 2024-11-22 RX ORDER — MULTIVIT WITH MINERALS/LUTEIN
500 TABLET ORAL
Refills: 0 | Status: COMPLETED | OUTPATIENT
Start: 2024-11-22 | End: 2024-11-27

## 2024-11-22 RX ORDER — GABAPENTIN 300 MG/1
100 CAPSULE ORAL EVERY 8 HOURS
Refills: 0 | Status: COMPLETED | OUTPATIENT
Start: 2024-11-22 | End: 2024-11-27

## 2024-11-22 RX ORDER — INSULIN REG, HUM S-S BUFF 100/ML
2 VIAL (ML) INJECTION
Qty: 50 | Refills: 0 | Status: DISCONTINUED | OUTPATIENT
Start: 2024-11-22 | End: 2024-11-23

## 2024-11-22 RX ORDER — POLYETHYLENE GLYCOL 3350 17 G/17G
17 POWDER, FOR SOLUTION ORAL DAILY
Refills: 0 | Status: DISCONTINUED | OUTPATIENT
Start: 2024-11-23 | End: 2024-11-27

## 2024-11-22 RX ORDER — ACETAMINOPHEN 500MG 500 MG/1
1000 TABLET, COATED ORAL EVERY 6 HOURS
Refills: 0 | Status: DISCONTINUED | OUTPATIENT
Start: 2024-11-23 | End: 2024-11-27

## 2024-11-22 RX ORDER — ACETAMINOPHEN 500MG 500 MG/1
1000 TABLET, COATED ORAL EVERY 6 HOURS
Refills: 0 | Status: COMPLETED | OUTPATIENT
Start: 2024-11-22 | End: 2024-11-23

## 2024-11-22 RX ORDER — OXYCODONE HYDROCHLORIDE 30 MG/1
5 TABLET ORAL EVERY 4 HOURS
Refills: 0 | Status: DISCONTINUED | OUTPATIENT
Start: 2024-11-22 | End: 2024-11-27

## 2024-11-22 RX ORDER — HEPARIN SODIUM,PORCINE 1000/ML
5000 VIAL (ML) INJECTION EVERY 8 HOURS
Refills: 0 | Status: DISCONTINUED | OUTPATIENT
Start: 2024-11-22 | End: 2024-11-27

## 2024-11-22 RX ORDER — SENNOSIDES 8.6 MG
2 TABLET ORAL AT BEDTIME
Refills: 0 | Status: DISCONTINUED | OUTPATIENT
Start: 2024-11-23 | End: 2024-11-27

## 2024-11-22 RX ORDER — AMIODARONE HYDROCHLORIDE 200 MG/1
400 TABLET ORAL
Refills: 0 | Status: DISCONTINUED | OUTPATIENT
Start: 2024-11-22 | End: 2024-11-22

## 2024-11-22 RX ORDER — CHLORHEXIDINE GLUCONATE 1.2 MG/ML
15 RINSE ORAL EVERY 12 HOURS
Refills: 0 | Status: DISCONTINUED | OUTPATIENT
Start: 2024-11-22 | End: 2024-11-22

## 2024-11-22 RX ORDER — CEFAZOLIN SODIUM 10 G
2000 VIAL (EA) INJECTION EVERY 8 HOURS
Refills: 0 | Status: COMPLETED | OUTPATIENT
Start: 2024-11-22 | End: 2024-11-24

## 2024-11-22 RX ORDER — SODIUM CHLORIDE 9 MG/ML
1000 INJECTION, SOLUTION INTRAMUSCULAR; INTRAVENOUS; SUBCUTANEOUS
Refills: 0 | Status: DISCONTINUED | OUTPATIENT
Start: 2024-11-22 | End: 2024-11-27

## 2024-11-22 RX ORDER — PANTOPRAZOLE SODIUM 40 MG/1
40 TABLET, DELAYED RELEASE ORAL DAILY
Refills: 0 | Status: DISCONTINUED | OUTPATIENT
Start: 2024-11-22 | End: 2024-11-23

## 2024-11-22 RX ADMIN — ACETAMINOPHEN 500MG 400 MILLIGRAM(S): 500 TABLET, COATED ORAL at 21:00

## 2024-11-22 RX ADMIN — Medication 1 APPLICATION(S): at 05:08

## 2024-11-22 RX ADMIN — PANTOPRAZOLE SODIUM 40 MILLIGRAM(S): 40 TABLET, DELAYED RELEASE ORAL at 06:01

## 2024-11-22 RX ADMIN — ACETAMINOPHEN 500MG 975 MILLIGRAM(S): 500 TABLET, COATED ORAL at 05:06

## 2024-11-22 RX ADMIN — ACETAMINOPHEN 500MG 650 MILLIGRAM(S): 500 TABLET, COATED ORAL at 00:30

## 2024-11-22 RX ADMIN — METOPROLOL TARTRATE 25 MILLIGRAM(S): 100 TABLET, FILM COATED ORAL at 05:08

## 2024-11-22 RX ADMIN — ACETAMINOPHEN 500MG 975 MILLIGRAM(S): 500 TABLET, COATED ORAL at 05:36

## 2024-11-22 RX ADMIN — ACETAMINOPHEN 500MG 1000 MILLIGRAM(S): 500 TABLET, COATED ORAL at 21:59

## 2024-11-22 RX ADMIN — CHLORHEXIDINE GLUCONATE 12 MILLILITER(S): 1.2 RINSE ORAL at 06:01

## 2024-11-22 NOTE — PROGRESS NOTE ADULT - SUBJECTIVE AND OBJECTIVE BOX
CTICU  CRITICAL  CARE  attending     Hand off received 					   Pertinent clinical, laboratory, radiographic, hemodynamic, echocardiographic, respiratory data, microbiologic data and chart were reviewed and analyzed frequently throughout the course of the day and night      49 year old male current smoker (30 pack years), with no significant past medical history.  He presented to [facility] on 11/17 with angina.  He was complaining of chest pain radiating to back and shoulders for a couple of days.   He ruled in for NSTEMI.   Troponin were elevated, patient was placed on heparin gtt and admitted to telemetry unit at [facility].   On arrival to ED, he was given ASA and Brilinta 180 mg and Plavix 300 mg.  CXR: WNL  ECHO:  Basal and mid inferior septum and basal inferior segment are hypokinetic. Normal LVEF  Normal right ventricular size and systolic function. Normal atria.  No significant valvular disease. No pericardial effusion.  CT Angio Abdomen and Pelvis w/ IV Cont (11.17.24 @ 16:42) -> No acute aortic dissection within the limitations of this non-ECG gated study.   Ectasia of the infrarenal abdominal aorta, measuring 2.8 cm, with small focus of pedunculated plaque. Additional multifocal aortoiliac plaque as described above.  Multivessel coronary artery calcifications.  Enlarged liver with diffuse steatosis and morphologic features of early cirrhosis. Right hepatic lobe 1 cm hypervascular focus.  Sub-4 mm pulmonary nodules.    Cardiac cath demonstrated triple vessel CAD.   The patient was transferred to [facility] under Dr. Dyson for CABG.    S/P CABG x 4.       FAMILY HISTORY:  PAST MEDICAL & SURGICAL HISTORY:  No pertinent past medical history  Back pain            14 system review was unremarkable    Vital signs, hemodynamic and respiratory parameters were reviewed from the bedside nursing flow sheet.  ICU Vital Signs Last 24 Hrs  T(C): 35.9 (22 Nov 2024 22:51), Max: 36.2 (22 Nov 2024 01:01)  T(F): 96.6 (22 Nov 2024 22:51), Max: 97.2 (22 Nov 2024 01:01)  HR: 78 (22 Nov 2024 23:01) (60 - 88)  BP: 131/66 (22 Nov 2024 07:05) (124/59 - 131/66)  BP(mean): 87 (22 Nov 2024 07:05) (85 - 87)  ABP: 107/56 (22 Nov 2024 23:00) (90/50 - 152/78)  ABP(mean): 72 (22 Nov 2024 23:00) (64 - 104)  RR: 18 (22 Nov 2024 23:00) (10 - 24)  SpO2: 96% (22 Nov 2024 23:01) (93% - 100%)    O2 Parameters below as of 22 Nov 2024 23:01  Patient On (Oxygen Delivery Method): nasal cannula, high flow  O2 Flow (L/min): 50  O2 Concentration (%): 50      Adult Advanced Hemodynamics Last 24 Hrs  CVP(mm Hg): 10 (22 Nov 2024 23:00) (2 - 14)  CVP(cm H2O): --  CO: --  CI: --  PA: --  PA(mean): --  PCWP: --  SVR: --  SVRI: --  PVR: --  PVRI: --, ABG - ( 22 Nov 2024 23:14 )  pH, Arterial: 7.47  pH, Blood: x     /  pCO2: 40    /  pO2: 87    / HCO3: 29    / Base Excess: 5.0   /  SaO2: 97.7              Mode: AC/ CMV (Assist Control/ Continuous Mandatory Ventilation)  RR (machine): 10  TV (machine): 500  FiO2: 50  PEEP: 5  ITime: 1  MAP: 9  PIP: 20    Intake and output was reviewed and the fluid balance was calculated  Daily Height in cm: 175.26 (22 Nov 2024 07:05)    Daily   I&O's Summary    21 Nov 2024 07:01  -  22 Nov 2024 07:00  --------------------------------------------------------  IN: 287.5 mL / OUT: 0 mL / NET: 287.5 mL    22 Nov 2024 07:01  -  23 Nov 2024 00:05  --------------------------------------------------------  IN: 500 mL / OUT: 1667 mL / NET: -1167 mL        All lines and drain sites were assessed    Neuro: No focal motor deficit.  Neck: No JVD.  CVS: S1, S2, No S3.  Lungs: Good air entry bilaterally.  Abd: Soft. No tenderness. + Bowel sounds.  Vascular: + DP/PT.  Extremities: Right forearm is swollen. Fasciotomy incision has a wound vac. Distal pulse and sensation is normal.  Lymphatic: Normal.  Skin: No abnormalities.      labs  CBC Full  -  ( 22 Nov 2024 21:29 )  WBC Count : 14.83 K/uL  RBC Count : 3.18 M/uL  Hemoglobin : 9.5 g/dL  Hematocrit : 28.1 %  Platelet Count - Automated : 162 K/uL  Mean Cell Volume : 88.4 fl  Mean Cell Hemoglobin : 29.9 pg  Mean Cell Hemoglobin Concentration : 33.8 g/dL  Auto Neutrophil # : x  Auto Lymphocyte # : x  Auto Monocyte # : x  Auto Eosinophil # : x  Auto Basophil # : x  Auto Neutrophil % : x  Auto Lymphocyte % : x  Auto Monocyte % : x  Auto Eosinophil % : x  Auto Basophil % : x    11-22    140  |  104  |  14  ----------------------------<  134[H]  5.0   |  27  |  1.20    Ca    8.3[L]      22 Nov 2024 21:29  Phos  4.1     11-22  Mg     2.0     11-22    TPro  5.3[L]  /  Alb  3.5  /  TBili  0.5  /  DBili  x   /  AST  125[H]  /  ALT  65[H]  /  AlkPhos  47  11-22    PT/INR - ( 22 Nov 2024 21:29 )   PT: 13.0 sec;   INR: 1.11          PTT - ( 22 Nov 2024 21:29 )  PTT:41.4 sec  The current medications were reviewed   MEDICATIONS  (STANDING):  acetaminophen   IVPB .. 1000 milliGRAM(s) IV Intermittent every 6 hours  ascorbic acid 500 milliGRAM(s) Oral two times a day  aspirin enteric coated 81 milliGRAM(s) Oral daily  ceFAZolin   IVPB 2000 milliGRAM(s) IV Intermittent every 8 hours  chlorhexidine 2% Cloths 1 Application(s) Topical daily  dextrose 50% Injectable 50 milliLiter(s) IV Push every 15 minutes  dextrose 50% Injectable 25 milliLiter(s) IV Push every 15 minutes  gabapentin 100 milliGRAM(s) Oral every 8 hours  heparin   Injectable 5000 Unit(s) SubCutaneous every 8 hours  influenza   Vaccine 0.5 milliLiter(s) IntraMuscular once  insulin regular Infusion 2 Unit(s)/Hr (2 mL/Hr) IV Continuous <Continuous>  mupirocin 2% Ointment 1 Application(s) Both Nostrils two times a day  pantoprazole  Injectable 40 milliGRAM(s) IV Push daily  polyethylene glycol 3350 17 Gram(s) Oral daily  senna 2 Tablet(s) Oral at bedtime  sodium chloride 0.9%. 1000 milliLiter(s) (10 mL/Hr) IV Continuous <Continuous>    MEDICATIONS  (PRN):  oxyCODONE    IR 5 milliGRAM(s) Oral every 4 hours PRN Moderate Pain (4 - 6)              49 year old  Male admitted with triple vessel CAD.  S/P CABG X 4 (LIMA to LAD, LADI to RCA, Radial to OM, SVG to OM).  Intraoperative course complicated by hematoma formation   Emergent vascular consult was called to rule out compartment syndrome.   5cm incision made on forearm over mid radial artery, fascia opened, decompressed. Hematoma noted and evaculated. Palpable radial pulse, palpable ulnar pulse, triphasic palmar arch and digital signals confirmed. Wound vac left in place  Hemodynamically stable.  Good oxygenation.  Fair urine out put.  Neuro exam is unchanged.         My plan includes :  Extubate  Statin Rx  D/C norepinephrine.  Calcium blocker for multiple arterial conduits.  Beta blocker Rx  Close hemodynamic monitoring   Monitor for arrhythmias and monitor parameters for organ perfusion  Monitor neurologic status  Monitor renal function.  Head of the bed should remain elevated to 45 deg .   Chest PT and IS will be encouraged  Monitor adequacy of oxygenation.  Nutritional goals will be met using po eventually , ensure adequate caloric intake and monitor the same  Stress ulcer and VTE prophylaxis will be achieved    Glycemic control is satisfactory  Electrolytes have been repleted as necessary and wound care has been carried out. Pain control has been achieved.   Aggressive physical therapy and early mobility and ambulation goals will be met   The family was updated about the course and plan  CRITICAL CARE TIME SPENT in evaluation and management, reassessments, review and interpretation of labs and x-rays, hemodynamic management, formulating a plan and coordinating care: ___40____ MIN.  Time does not include procedural time.  CTICU ATTENDING     					    Alejandro Dow MD

## 2024-11-22 NOTE — PRE-OP CHECKLIST - SELECT TESTS ORDERED
BMP/CBC/PT/PTT/INR/Type and Screen/Urinalysis/CXR 138/BMP/CBC/PT/PTT/INR/Type and Screen/Urinalysis/CXR/POCT Blood Glucose

## 2024-11-22 NOTE — BRIEF OPERATIVE NOTE - OPERATION/FINDINGS
EF  GSV harvest time: 34 min, GSV prep time: 11 min  Radial artery harvest time: 36 min, Radial artery prep time: 8 min EF 45-50%  GSV harvest time: 34 min, GSV prep time: 11 min  Radial artery harvest time: 36 min, Radial artery prep time: 8 min

## 2024-11-22 NOTE — BRIEF OPERATIVE NOTE - OPERATION/FINDINGS
Intraoperative consult for RUE forearm compartment syndrome after possible damage to radial injury from arterial line. 5cm incision made on forearm over mid radial artery, fascia opened, decompressed. Hematoma noted and evaculated. Palpable radial pulse, palpable ulnar pulse, triphasic palmar arch and digital signals confirmed. Wound vac left in place.

## 2024-11-22 NOTE — BRIEF OPERATIVE NOTE - COMMENTS
Arrived to CTICU intubated, HD stable Arrived to CTICU intubated, HD stable  Aortic cross clamp time: 108

## 2024-11-22 NOTE — PRE-OP CHECKLIST - # OF UNITS
Run the humidifier at night or when napping to loosen any mucous.     Saline in the nose can help immensely-- you can do this as often as needed. You can turn the saline bottle upside down and squeeze a stream of saline on 1 side of the nose. Let this clear and then do the other side of the nose. The excess will just drain down the throat. Use the bulb suction to capture any loosened mucous.  Use saline before naps and bedtimes or after sleeping as well, as needed.     Elevate the head of bed if needed by placing something UNDER the mattress. This will at least allow the baby to fall asleep but they may not stay there all night.     In the morning or after naps, you can offer a little water first before a bottle to allow the mucous to loosen before offering a bottle.     Call if getting worse, sicker, eating/drinking poorly, increasing irritability, or if worried. Most cough colds can take up to 2 weeks to slowly improve.      Use either Ibuprofen or Acetaminophen for pain or fever relief:     Infant Ibuprofen 100 mg/2.5 ml  2.5ml OR  Ibuprofen (Advil, Motrin) 100 mg/5ml 5 ml every 6 hours as needed.      And/or    Acetaminophen (Tylenol) 160 mg/5ml 5  ml (infant and childrens is the SAME concentration) every 4 hours.       If your child still has fever or pain 1 hour after dosing ibuprofen you can give an additional dose of acetaminophen to achive more relief.      Make sure to note the times the ibuprofen and acetaminophen is given because it can get very confusing, there should always be 6 hours between doses of ibuprofen and 4 hours between dosing of acetminophen.     Benedryl--   3.75 ml at bedtime as needed for cold symptoms or sleep.        4

## 2024-11-22 NOTE — PROGRESS NOTE ADULT - SUBJECTIVE AND OBJECTIVE BOX
CTICU  CRITICAL  CARE  attending     Hand off received 					   Pertinent clinical, laboratory, radiographic, hemodynamic, echocardiographic, respiratory data, microbiologic data and chart were reviewed and analyzed frequently throughout the course of the day  Patient seen and examined with CTS/ SH attending at bedside  Pt is a 49yr old male with no reported PMH initially presented to [facility] 11/17 for CP. Found to have troponemia and was admitted for ACS. Cath on 11/18 revealed 3V CAD for which pt was tx to [facility] 11/18 for surgical evaluation. Pt is undergoing preoperative planning. Pt underwent CABG x 4 (LIMA-LAD, Radial-OM, SVG-OM, LADI-RCA, EF 45-50%; CBP 198min, XC 108min) with Dr. Dyson 11/22/24. Procedure cb by hematoma in RUE 2/2 R radial arterial line requiring intraop vascular consult and fasciotomy. Arrived to CTICU intubated on propofol and precedex. Given 4L IVF with 1L urine output.       FAMILY HISTORY:  PAST MEDICAL & SURGICAL HISTORY:  No pertinent past medical history  Back pain        Patient is a 49y old  Male who presents with a chief complaint of CAD/ VESSEL DISEASE        14 system review was unremarkable    Vital signs, hemodynamic and respiratory parameters were reviewed from the bedside nursing flowsheet.  ICU Vital Signs Last 24 Hrs  T(C): 36.1 (22 Nov 2024 07:05), Max: 36.4 (21 Nov 2024 22:50)  T(F): 97 (22 Nov 2024 06:37), Max: 97.6 (21 Nov 2024 22:50)  HR: 62 (22 Nov 2024 07:05) (60 - 76)  BP: 131/66 (22 Nov 2024 07:05) (124/59 - 131/66)  BP(mean): 87 (22 Nov 2024 07:05) (85 - 87)  ABP: --  ABP(mean): --  RR: 16 (22 Nov 2024 07:05) (16 - 18)  SpO2: 95% (22 Nov 2024 07:05) (95% - 98%)    O2 Parameters below as of 22 Nov 2024 06:37  Patient On (Oxygen Delivery Method): room air          Adult Advanced Hemodynamics Last 24 Hrs  CVP(mm Hg): --  CVP(cm H2O): --  CO: --  CI: --  PA: --  PA(mean): --  PCWP: --  SVR: --  SVRI: --  PVR: --  PVRI: --, ABG - ( 22 Nov 2024 18:21 )  pH, Arterial: 7.46  pH, Blood: x     /  pCO2: 37    /  pO2: 89    / HCO3: 26    / Base Excess: 2.5   /  SaO2: 98.5                Intake and output was reviewed and the fluid balance was calculated  Daily Height in cm: 175.26 (22 Nov 2024 07:05)    Daily   I&O's Summary    21 Nov 2024 07:01  -  22 Nov 2024 07:00  --------------------------------------------------------  IN: 287.5 mL / OUT: 0 mL / NET: 287.5 mL        All lines and drain sites were assessed  [facility]      POCT Blood Glucose.: 138 mg/dL (22 Nov 2024 05:44)      Neuro: sedated  HEENT: ett  Heart: s1 s2  Lungs: clear bl  Abdomen: soft nt nd  Extremities: RUE swollen with wound vac, unable to palpate radial pulse, warm le    Lines:  RIJ TLC 11/22  L femoral arterial line 11/22    Tubes:  Substernal   L pleural    labs  CBC Full  -  ( 22 Nov 2024 06:36 )  WBC Count : 10.89 K/uL  RBC Count : 4.71 M/uL  Hemoglobin : 14.6 g/dL  Hematocrit : 43.6 %  Platelet Count - Automated : 286 K/uL  Mean Cell Volume : 92.6 fl  Mean Cell Hemoglobin : 31.0 pg  Mean Cell Hemoglobin Concentration : 33.5 g/dL  Auto Neutrophil # : 5.34 K/uL  Auto Lymphocyte # : 4.31 K/uL  Auto Monocyte # : 0.70 K/uL  Auto Eosinophil # : 0.36 K/uL  Auto Basophil # : 0.07 K/uL  Auto Neutrophil % : 49.1 %  Auto Lymphocyte % : 39.6 %  Auto Monocyte % : 6.4 %  Auto Eosinophil % : 3.3 %  Auto Basophil % : 0.6 %    11-22    135  |  100  |  20  ----------------------------<  120[H]  3.8   |  22  |  1.14    Ca    9.1      22 Nov 2024 06:36  Phos  4.0     11-21  Mg     2.1     11-22    TPro  7.6  /  Alb  4.5  /  TBili  0.3  /  DBili  x   /  AST  72[H]  /  ALT  118[H]  /  AlkPhos  86  11-22    PT/INR - ( 22 Nov 2024 06:36 )   PT: 10.4 sec;   INR: 0.90          PTT - ( 22 Nov 2024 06:36 )  PTT:74.2 sec  The current medications were reviewed   MEDICATIONS  (STANDING):  acetaminophen   IVPB .. 1000 milliGRAM(s) IV Intermittent every 6 hours  aMIOdarone    Tablet 400 milliGRAM(s) Oral two times a day  ascorbic acid 500 milliGRAM(s) Oral two times a day  aspirin enteric coated 81 milliGRAM(s) Oral daily  ceFAZolin   IVPB 2000 milliGRAM(s) IV Intermittent every 8 hours  chlorhexidine 2% Cloths 1 Application(s) Topical daily  dextrose 50% Injectable 50 milliLiter(s) IV Push every 15 minutes  dextrose 50% Injectable 25 milliLiter(s) IV Push every 15 minutes  heparin   Injectable 5000 Unit(s) SubCutaneous every 8 hours  influenza   Vaccine 0.5 milliLiter(s) IntraMuscular once  insulin regular Infusion 2 Unit(s)/Hr (2 mL/Hr) IV Continuous <Continuous>  mupirocin 2% Ointment 1 Application(s) Both Nostrils two times a day  pantoprazole  Injectable 40 milliGRAM(s) IV Push daily  sodium chloride 0.9%. 1000 milliLiter(s) (10 mL/Hr) IV Continuous <Continuous>    MEDICATIONS  (PRN):  oxyCODONE    IR 5 milliGRAM(s) Oral every 4 hours PRN Moderate Pain (4 - 6)      Assessment/Plan:  49yr old male with no reported PMH initially presented to [facility] 11/17 for CP. Found to have troponemia and was admitted for ACS. Cath on 11/18 revealed 3V CAD for which pt was tx to [facility] 11/18 for surgical evaluation. Pt is undergoing preoperative planning.     Sp CABG x 4 (LIMA-LAD, Radial-OM, SVG-OM, LADI-RCA, EF 45-50%; CBP 198min, XC 108min, [place], 11/22/24)  Acute post operative mechanical ventilation requirement-wean to extubate  Vasogenic shock on levo-titrate to MAP 65  Postop labs  Postop CXR  Aspirin  Statin  Periop abx  Replete lytes prn  Monitor CT output  GI/DVT PPX  Bowel Regimen  Pain control  Close hemodynamic, ventilatory and drain monitoring and management per post op routine  Monitor for arrhythmias and monitor parameters for organ perfusion  Beta blockade not administered due to hemodynamic instability and bradycardia  Monitor neurologic status  Head of the bed should remain elevated to 45 deg   Chest PT and IS will be encouraged  Monitor adequacy of oxygenation and ventilation and attempt to wean oxygen  Antibiotic regimen will be tailored to the clinical, laboratory and microbiologic data  Nutritional goals will be met using po eventually, ensure adequate caloric intake and monitor the same  Stress ulcer and VTE prophylaxis will be achieved    Glycemic control is satisfactory  Electrolytes have been repleted as necessary and wound care has been carried out   Pain control has been achieved.   Aggressive physical therapy and early mobility and ambulation goals will be met   The family was updated about the course and plan.    CRITICAL CARE TIME personally provided by me  in evaluation and management, reassessments, review and interpretation of labs and x-rays, ventilator and hemodynamic management, formulating a plan and coordinating care: ___140____ MIN.  Time does not include procedural time.           CTICU ATTENDING     					  Shane Morillo MD

## 2024-11-23 LAB
ALBUMIN SERPL ELPH-MCNC: 3.6 G/DL — SIGNIFICANT CHANGE UP (ref 3.3–5)
ALBUMIN SERPL ELPH-MCNC: 3.7 G/DL — SIGNIFICANT CHANGE UP (ref 3.3–5)
ALBUMIN SERPL ELPH-MCNC: 3.7 G/DL — SIGNIFICANT CHANGE UP (ref 3.3–5)
ALP SERPL-CCNC: 50 U/L — SIGNIFICANT CHANGE UP (ref 40–120)
ALP SERPL-CCNC: 50 U/L — SIGNIFICANT CHANGE UP (ref 40–120)
ALP SERPL-CCNC: 51 U/L — SIGNIFICANT CHANGE UP (ref 40–120)
ALT FLD-CCNC: 43 U/L — SIGNIFICANT CHANGE UP (ref 10–45)
ALT FLD-CCNC: 54 U/L — HIGH (ref 10–45)
ALT FLD-CCNC: 62 U/L — HIGH (ref 10–45)
ANION GAP SERPL CALC-SCNC: 10 MMOL/L — SIGNIFICANT CHANGE UP (ref 5–17)
ANION GAP SERPL CALC-SCNC: 11 MMOL/L — SIGNIFICANT CHANGE UP (ref 5–17)
ANION GAP SERPL CALC-SCNC: 11 MMOL/L — SIGNIFICANT CHANGE UP (ref 5–17)
APTT BLD: 30.6 SEC — SIGNIFICANT CHANGE UP (ref 24.5–35.6)
APTT BLD: 31.4 SEC — SIGNIFICANT CHANGE UP (ref 24.5–35.6)
APTT BLD: 52.1 SEC — HIGH (ref 24.5–35.6)
AST SERPL-CCNC: 116 U/L — HIGH (ref 10–40)
AST SERPL-CCNC: 71 U/L — HIGH (ref 10–40)
AST SERPL-CCNC: 89 U/L — HIGH (ref 10–40)
BASOPHILS # BLD AUTO: 0.02 K/UL — SIGNIFICANT CHANGE UP (ref 0–0.2)
BASOPHILS # BLD AUTO: 0.02 K/UL — SIGNIFICANT CHANGE UP (ref 0–0.2)
BASOPHILS NFR BLD AUTO: 0.1 % — SIGNIFICANT CHANGE UP (ref 0–2)
BASOPHILS NFR BLD AUTO: 0.1 % — SIGNIFICANT CHANGE UP (ref 0–2)
BILIRUB SERPL-MCNC: 0.4 MG/DL — SIGNIFICANT CHANGE UP (ref 0.2–1.2)
BILIRUB SERPL-MCNC: 0.5 MG/DL — SIGNIFICANT CHANGE UP (ref 0.2–1.2)
BILIRUB SERPL-MCNC: 0.6 MG/DL — SIGNIFICANT CHANGE UP (ref 0.2–1.2)
BUN SERPL-MCNC: 16 MG/DL — SIGNIFICANT CHANGE UP (ref 7–23)
BUN SERPL-MCNC: 20 MG/DL — SIGNIFICANT CHANGE UP (ref 7–23)
BUN SERPL-MCNC: 22 MG/DL — SIGNIFICANT CHANGE UP (ref 7–23)
CALCIUM SERPL-MCNC: 8.1 MG/DL — LOW (ref 8.4–10.5)
CALCIUM SERPL-MCNC: 8.2 MG/DL — LOW (ref 8.4–10.5)
CALCIUM SERPL-MCNC: 8.4 MG/DL — SIGNIFICANT CHANGE UP (ref 8.4–10.5)
CHLORIDE SERPL-SCNC: 100 MMOL/L — SIGNIFICANT CHANGE UP (ref 96–108)
CHLORIDE SERPL-SCNC: 102 MMOL/L — SIGNIFICANT CHANGE UP (ref 96–108)
CHLORIDE SERPL-SCNC: 95 MMOL/L — LOW (ref 96–108)
CO2 SERPL-SCNC: 26 MMOL/L — SIGNIFICANT CHANGE UP (ref 22–31)
CO2 SERPL-SCNC: 27 MMOL/L — SIGNIFICANT CHANGE UP (ref 22–31)
CO2 SERPL-SCNC: 28 MMOL/L — SIGNIFICANT CHANGE UP (ref 22–31)
CREAT SERPL-MCNC: 1.27 MG/DL — SIGNIFICANT CHANGE UP (ref 0.5–1.3)
CREAT SERPL-MCNC: 1.29 MG/DL — SIGNIFICANT CHANGE UP (ref 0.5–1.3)
CREAT SERPL-MCNC: 1.32 MG/DL — HIGH (ref 0.5–1.3)
EGFR: 66 ML/MIN/1.73M2 — SIGNIFICANT CHANGE UP
EGFR: 68 ML/MIN/1.73M2 — SIGNIFICANT CHANGE UP
EGFR: 69 ML/MIN/1.73M2 — SIGNIFICANT CHANGE UP
EOSINOPHIL # BLD AUTO: 0 K/UL — SIGNIFICANT CHANGE UP (ref 0–0.5)
EOSINOPHIL # BLD AUTO: 0 K/UL — SIGNIFICANT CHANGE UP (ref 0–0.5)
EOSINOPHIL NFR BLD AUTO: 0 % — SIGNIFICANT CHANGE UP (ref 0–6)
EOSINOPHIL NFR BLD AUTO: 0 % — SIGNIFICANT CHANGE UP (ref 0–6)
GAS PNL BLDA: SIGNIFICANT CHANGE UP
GLUCOSE SERPL-MCNC: 145 MG/DL — HIGH (ref 70–99)
GLUCOSE SERPL-MCNC: 149 MG/DL — HIGH (ref 70–99)
GLUCOSE SERPL-MCNC: 158 MG/DL — HIGH (ref 70–99)
HCT VFR BLD CALC: 26.1 % — LOW (ref 39–50)
HCT VFR BLD CALC: 28 % — LOW (ref 39–50)
HCT VFR BLD CALC: 28 % — LOW (ref 39–50)
HGB BLD-MCNC: 8.4 G/DL — LOW (ref 13–17)
HGB BLD-MCNC: 9 G/DL — LOW (ref 13–17)
HGB BLD-MCNC: 9.4 G/DL — LOW (ref 13–17)
IMM GRANULOCYTES NFR BLD AUTO: 0.9 % — SIGNIFICANT CHANGE UP (ref 0–0.9)
IMM GRANULOCYTES NFR BLD AUTO: 1 % — HIGH (ref 0–0.9)
INR BLD: 1.09 — SIGNIFICANT CHANGE UP (ref 0.85–1.16)
INR BLD: 1.1 — SIGNIFICANT CHANGE UP (ref 0.85–1.16)
INR BLD: 1.14 — SIGNIFICANT CHANGE UP (ref 0.85–1.16)
LACTATE SERPL-SCNC: 1.3 MMOL/L — SIGNIFICANT CHANGE UP (ref 0.5–2)
LYMPHOCYTES # BLD AUTO: 1.83 K/UL — SIGNIFICANT CHANGE UP (ref 1–3.3)
LYMPHOCYTES # BLD AUTO: 11.3 % — LOW (ref 13–44)
LYMPHOCYTES # BLD AUTO: 12.1 % — LOW (ref 13–44)
LYMPHOCYTES # BLD AUTO: 2.03 K/UL — SIGNIFICANT CHANGE UP (ref 1–3.3)
MAGNESIUM SERPL-MCNC: 1.9 MG/DL — SIGNIFICANT CHANGE UP (ref 1.6–2.6)
MAGNESIUM SERPL-MCNC: 2.2 MG/DL — SIGNIFICANT CHANGE UP (ref 1.6–2.6)
MAGNESIUM SERPL-MCNC: 2.3 MG/DL — SIGNIFICANT CHANGE UP (ref 1.6–2.6)
MCHC RBC-ENTMCNC: 29.8 PG — SIGNIFICANT CHANGE UP (ref 27–34)
MCHC RBC-ENTMCNC: 29.9 PG — SIGNIFICANT CHANGE UP (ref 27–34)
MCHC RBC-ENTMCNC: 30.5 PG — SIGNIFICANT CHANGE UP (ref 27–34)
MCHC RBC-ENTMCNC: 32.1 G/DL — SIGNIFICANT CHANGE UP (ref 32–36)
MCHC RBC-ENTMCNC: 32.2 G/DL — SIGNIFICANT CHANGE UP (ref 32–36)
MCHC RBC-ENTMCNC: 33.6 G/DL — SIGNIFICANT CHANGE UP (ref 32–36)
MCV RBC AUTO: 90.9 FL — SIGNIFICANT CHANGE UP (ref 80–100)
MCV RBC AUTO: 92.7 FL — SIGNIFICANT CHANGE UP (ref 80–100)
MCV RBC AUTO: 92.9 FL — SIGNIFICANT CHANGE UP (ref 80–100)
MONOCYTES # BLD AUTO: 1.26 K/UL — HIGH (ref 0–0.9)
MONOCYTES # BLD AUTO: 1.5 K/UL — HIGH (ref 0–0.9)
MONOCYTES NFR BLD AUTO: 8.3 % — SIGNIFICANT CHANGE UP (ref 2–14)
MONOCYTES NFR BLD AUTO: 8.4 % — SIGNIFICANT CHANGE UP (ref 2–14)
NEUTROPHILS # BLD AUTO: 11.83 K/UL — HIGH (ref 1.8–7.4)
NEUTROPHILS # BLD AUTO: 14.31 K/UL — HIGH (ref 1.8–7.4)
NEUTROPHILS NFR BLD AUTO: 78.5 % — HIGH (ref 43–77)
NEUTROPHILS NFR BLD AUTO: 79.3 % — HIGH (ref 43–77)
NRBC # BLD: 0 /100 WBCS — SIGNIFICANT CHANGE UP (ref 0–0)
PHOSPHATE SERPL-MCNC: 2.6 MG/DL — SIGNIFICANT CHANGE UP (ref 2.5–4.5)
PHOSPHATE SERPL-MCNC: 3.8 MG/DL — SIGNIFICANT CHANGE UP (ref 2.5–4.5)
PHOSPHATE SERPL-MCNC: 3.9 MG/DL — SIGNIFICANT CHANGE UP (ref 2.5–4.5)
PLATELET # BLD AUTO: 153 K/UL — SIGNIFICANT CHANGE UP (ref 150–400)
PLATELET # BLD AUTO: 167 K/UL — SIGNIFICANT CHANGE UP (ref 150–400)
PLATELET # BLD AUTO: 184 K/UL — SIGNIFICANT CHANGE UP (ref 150–400)
POTASSIUM SERPL-MCNC: 3.9 MMOL/L — SIGNIFICANT CHANGE UP (ref 3.5–5.3)
POTASSIUM SERPL-MCNC: 4.2 MMOL/L — SIGNIFICANT CHANGE UP (ref 3.5–5.3)
POTASSIUM SERPL-MCNC: 4.2 MMOL/L — SIGNIFICANT CHANGE UP (ref 3.5–5.3)
POTASSIUM SERPL-SCNC: 3.9 MMOL/L — SIGNIFICANT CHANGE UP (ref 3.5–5.3)
POTASSIUM SERPL-SCNC: 4.2 MMOL/L — SIGNIFICANT CHANGE UP (ref 3.5–5.3)
POTASSIUM SERPL-SCNC: 4.2 MMOL/L — SIGNIFICANT CHANGE UP (ref 3.5–5.3)
PROT SERPL-MCNC: 5.7 G/DL — LOW (ref 6–8.3)
PROT SERPL-MCNC: 5.9 G/DL — LOW (ref 6–8.3)
PROT SERPL-MCNC: 6 G/DL — SIGNIFICANT CHANGE UP (ref 6–8.3)
PROTHROM AB SERPL-ACNC: 12.6 SEC — SIGNIFICANT CHANGE UP (ref 9.9–13.4)
PROTHROM AB SERPL-ACNC: 12.7 SEC — SIGNIFICANT CHANGE UP (ref 9.9–13.4)
PROTHROM AB SERPL-ACNC: 13.1 SEC — SIGNIFICANT CHANGE UP (ref 9.9–13.4)
RBC # BLD: 2.81 M/UL — LOW (ref 4.2–5.8)
RBC # BLD: 3.02 M/UL — LOW (ref 4.2–5.8)
RBC # BLD: 3.08 M/UL — LOW (ref 4.2–5.8)
RBC # FLD: 13.5 % — SIGNIFICANT CHANGE UP (ref 10.3–14.5)
RBC # FLD: 14 % — SIGNIFICANT CHANGE UP (ref 10.3–14.5)
RBC # FLD: 14.2 % — SIGNIFICANT CHANGE UP (ref 10.3–14.5)
SODIUM SERPL-SCNC: 134 MMOL/L — LOW (ref 135–145)
SODIUM SERPL-SCNC: 138 MMOL/L — SIGNIFICANT CHANGE UP (ref 135–145)
SODIUM SERPL-SCNC: 138 MMOL/L — SIGNIFICANT CHANGE UP (ref 135–145)
WBC # BLD: 12.31 K/UL — HIGH (ref 3.8–10.5)
WBC # BLD: 15.08 K/UL — HIGH (ref 3.8–10.5)
WBC # BLD: 18.04 K/UL — HIGH (ref 3.8–10.5)
WBC # FLD AUTO: 12.31 K/UL — HIGH (ref 3.8–10.5)
WBC # FLD AUTO: 15.08 K/UL — HIGH (ref 3.8–10.5)
WBC # FLD AUTO: 18.04 K/UL — HIGH (ref 3.8–10.5)

## 2024-11-23 PROCEDURE — 99291 CRITICAL CARE FIRST HOUR: CPT

## 2024-11-23 PROCEDURE — 71045 X-RAY EXAM CHEST 1 VIEW: CPT | Mod: 26

## 2024-11-23 RX ORDER — FUROSEMIDE 40 MG/1
20 TABLET ORAL ONCE
Refills: 0 | Status: COMPLETED | OUTPATIENT
Start: 2024-11-23 | End: 2024-11-23

## 2024-11-23 RX ORDER — 0.9 % SODIUM CHLORIDE 0.9 %
1000 INTRAVENOUS SOLUTION INTRAVENOUS
Refills: 0 | Status: DISCONTINUED | OUTPATIENT
Start: 2024-11-23 | End: 2024-11-27

## 2024-11-23 RX ORDER — OXYCODONE HYDROCHLORIDE 30 MG/1
5 TABLET ORAL ONCE
Refills: 0 | Status: DISCONTINUED | OUTPATIENT
Start: 2024-11-23 | End: 2024-11-23

## 2024-11-23 RX ORDER — POTASSIUM CHLORIDE 600 MG/1
20 TABLET, EXTENDED RELEASE ORAL ONCE
Refills: 0 | Status: COMPLETED | OUTPATIENT
Start: 2024-11-23 | End: 2024-11-23

## 2024-11-23 RX ORDER — METOPROLOL TARTRATE 100 MG/1
12.5 TABLET, FILM COATED ORAL EVERY 8 HOURS
Refills: 0 | Status: DISCONTINUED | OUTPATIENT
Start: 2024-11-23 | End: 2024-11-25

## 2024-11-23 RX ORDER — HYDROMORPHONE HYDROCHLORIDE 2 MG/1
0.5 TABLET ORAL ONCE
Refills: 0 | Status: DISCONTINUED | OUTPATIENT
Start: 2024-11-23 | End: 2024-11-23

## 2024-11-23 RX ORDER — PANTOPRAZOLE SODIUM 40 MG/1
40 TABLET, DELAYED RELEASE ORAL
Refills: 0 | Status: DISCONTINUED | OUTPATIENT
Start: 2024-11-23 | End: 2024-11-27

## 2024-11-23 RX ORDER — FENTANYL 12 UG/H
25 PATCH, EXTENDED RELEASE TRANSDERMAL ONCE
Refills: 0 | Status: DISCONTINUED | OUTPATIENT
Start: 2024-11-23 | End: 2024-11-23

## 2024-11-23 RX ORDER — GLUCAGON INJECTION, SOLUTION 0.5 MG/.1ML
1 INJECTION, SOLUTION SUBCUTANEOUS ONCE
Refills: 0 | Status: DISCONTINUED | OUTPATIENT
Start: 2024-11-23 | End: 2024-11-27

## 2024-11-23 RX ADMIN — GABAPENTIN 100 MILLIGRAM(S): 300 CAPSULE ORAL at 15:18

## 2024-11-23 RX ADMIN — Medication 100 MILLIGRAM(S): at 09:32

## 2024-11-23 RX ADMIN — ACETAMINOPHEN 500MG 1000 MILLIGRAM(S): 500 TABLET, COATED ORAL at 14:44

## 2024-11-23 RX ADMIN — ACETAMINOPHEN 500MG 1000 MILLIGRAM(S): 500 TABLET, COATED ORAL at 03:00

## 2024-11-23 RX ADMIN — OXYCODONE HYDROCHLORIDE 5 MILLIGRAM(S): 30 TABLET ORAL at 19:19

## 2024-11-23 RX ADMIN — Medication 5000 UNIT(S): at 07:43

## 2024-11-23 RX ADMIN — Medication 2 TABLET(S): at 21:32

## 2024-11-23 RX ADMIN — PANTOPRAZOLE SODIUM 40 MILLIGRAM(S): 40 TABLET, DELAYED RELEASE ORAL at 08:18

## 2024-11-23 RX ADMIN — OXYCODONE HYDROCHLORIDE 5 MILLIGRAM(S): 30 TABLET ORAL at 12:41

## 2024-11-23 RX ADMIN — OXYCODONE HYDROCHLORIDE 5 MILLIGRAM(S): 30 TABLET ORAL at 15:17

## 2024-11-23 RX ADMIN — Medication 2: at 11:41

## 2024-11-23 RX ADMIN — ACETAMINOPHEN 500MG 1000 MILLIGRAM(S): 500 TABLET, COATED ORAL at 22:30

## 2024-11-23 RX ADMIN — Medication 500 MILLIGRAM(S): at 17:03

## 2024-11-23 RX ADMIN — ACETAMINOPHEN 500MG 1000 MILLIGRAM(S): 500 TABLET, COATED ORAL at 21:32

## 2024-11-23 RX ADMIN — Medication 100 MILLIGRAM(S): at 17:03

## 2024-11-23 RX ADMIN — OXYCODONE HYDROCHLORIDE 5 MILLIGRAM(S): 30 TABLET ORAL at 06:00

## 2024-11-23 RX ADMIN — Medication 81 MILLIGRAM(S): at 11:41

## 2024-11-23 RX ADMIN — GABAPENTIN 100 MILLIGRAM(S): 300 CAPSULE ORAL at 00:13

## 2024-11-23 RX ADMIN — Medication 100 MILLIGRAM(S): at 01:21

## 2024-11-23 RX ADMIN — Medication 25 GRAM(S): at 04:07

## 2024-11-23 RX ADMIN — Medication 5000 UNIT(S): at 15:18

## 2024-11-23 RX ADMIN — OXYCODONE HYDROCHLORIDE 5 MILLIGRAM(S): 30 TABLET ORAL at 06:45

## 2024-11-23 RX ADMIN — ACETAMINOPHEN 500MG 400 MILLIGRAM(S): 500 TABLET, COATED ORAL at 14:29

## 2024-11-23 RX ADMIN — METOPROLOL TARTRATE 12.5 MILLIGRAM(S): 100 TABLET, FILM COATED ORAL at 12:10

## 2024-11-23 RX ADMIN — Medication 5000 UNIT(S): at 00:14

## 2024-11-23 RX ADMIN — HYDROMORPHONE HYDROCHLORIDE 0.5 MILLIGRAM(S): 2 TABLET ORAL at 09:54

## 2024-11-23 RX ADMIN — OXYCODONE HYDROCHLORIDE 5 MILLIGRAM(S): 30 TABLET ORAL at 19:57

## 2024-11-23 RX ADMIN — METOPROLOL TARTRATE 12.5 MILLIGRAM(S): 100 TABLET, FILM COATED ORAL at 21:32

## 2024-11-23 RX ADMIN — OXYCODONE HYDROCHLORIDE 5 MILLIGRAM(S): 30 TABLET ORAL at 06:55

## 2024-11-23 RX ADMIN — Medication 1 APPLICATION(S): at 17:04

## 2024-11-23 RX ADMIN — POLYETHYLENE GLYCOL 3350 17 GRAM(S): 17 POWDER, FOR SOLUTION ORAL at 11:41

## 2024-11-23 RX ADMIN — OXYCODONE HYDROCHLORIDE 5 MILLIGRAM(S): 30 TABLET ORAL at 00:13

## 2024-11-23 RX ADMIN — CHLORHEXIDINE GLUCONATE 1 APPLICATION(S): 1.2 RINSE ORAL at 11:42

## 2024-11-23 RX ADMIN — ACETAMINOPHEN 500MG 1000 MILLIGRAM(S): 500 TABLET, COATED ORAL at 08:45

## 2024-11-23 RX ADMIN — GABAPENTIN 100 MILLIGRAM(S): 300 CAPSULE ORAL at 07:43

## 2024-11-23 RX ADMIN — FUROSEMIDE 20 MILLIGRAM(S): 40 TABLET ORAL at 15:18

## 2024-11-23 RX ADMIN — Medication 500 MILLIGRAM(S): at 06:05

## 2024-11-23 RX ADMIN — OXYCODONE HYDROCHLORIDE 5 MILLIGRAM(S): 30 TABLET ORAL at 16:17

## 2024-11-23 RX ADMIN — FENTANYL 25 MICROGRAM(S): 12 PATCH, EXTENDED RELEASE TRANSDERMAL at 08:00

## 2024-11-23 RX ADMIN — FENTANYL 25 MICROGRAM(S): 12 PATCH, EXTENDED RELEASE TRANSDERMAL at 07:35

## 2024-11-23 RX ADMIN — OXYCODONE HYDROCHLORIDE 5 MILLIGRAM(S): 30 TABLET ORAL at 01:00

## 2024-11-23 RX ADMIN — ACETAMINOPHEN 500MG 400 MILLIGRAM(S): 500 TABLET, COATED ORAL at 02:35

## 2024-11-23 RX ADMIN — OXYCODONE HYDROCHLORIDE 5 MILLIGRAM(S): 30 TABLET ORAL at 11:41

## 2024-11-23 RX ADMIN — OXYCODONE HYDROCHLORIDE 5 MILLIGRAM(S): 30 TABLET ORAL at 07:40

## 2024-11-23 RX ADMIN — ACETAMINOPHEN 500MG 400 MILLIGRAM(S): 500 TABLET, COATED ORAL at 08:28

## 2024-11-23 RX ADMIN — Medication 2: at 16:44

## 2024-11-23 RX ADMIN — HYDROMORPHONE HYDROCHLORIDE 0.5 MILLIGRAM(S): 2 TABLET ORAL at 09:39

## 2024-11-23 RX ADMIN — Medication 1 APPLICATION(S): at 06:39

## 2024-11-23 NOTE — PHYSICAL THERAPY INITIAL EVALUATION ADULT - ADDITIONAL COMMENTS
Patient lives with wife in private home +3STE, 1st floor set-up. PTA, pt was independent with all ADLs and functional mobility, without the use of any AD.

## 2024-11-23 NOTE — CHART NOTE - NSCHARTNOTEFT_GEN_A_CORE
CT Removal:    Pt seen and examined at bedside.  Case discussed with Dr. Stark and is recommending removal of L pleural CT.  Minimal output from CT.  No air leak appreciated.  CT removed without incident.  Occlusive dressing placed. Follow up CXR with no obvious PTX noted.  Pt tolerated procedure well and remained hemodynamically stable.

## 2024-11-23 NOTE — PHYSICAL THERAPY INITIAL EVALUATION ADULT - LEVEL OF INDEPENDENCE: GAIT, REHAB EVAL
patient amb on NC @6L/min - *of note, patient desatted to ~87%. Patient brought back to room. CARIN Slade MD [facility] informed. Patient placed on HFNC @ 50/50./contact guard

## 2024-11-23 NOTE — PROGRESS NOTE ADULT - SUBJECTIVE AND OBJECTIVE BOX
CTICU  CRITICAL  CARE  attending     Hand off received 					   Pertinent clinical, laboratory, radiographic, hemodynamic, echocardiographic, respiratory data, microbiologic data and chart were reviewed and analyzed frequently throughout the course of the day  Patient seen and examined with CTS/ SH attending at bedside  Pt is a 49yr old male with no reported PMH initially presented to [facility] 11/17 for CP. Found to have troponemia and was admitted for ACS. Cath on 11/18 revealed 3V CAD for which pt was tx to [facility] 11/18 for surgical evaluation. Pt is undergoing preoperative planning. Pt underwent CABG x 4 (LIMA-LAD, Radial-OM, SVG-OM, LADI-RCA, EF 45-50%; CBP 198min, XC 108min) with Dr. Dyson 11/22/24. Procedure cb by hematoma in RUE 2/2 R radial arterial line requiring intraop vascular consult and fasciotomy. Arrived to CTICU intubated on propofol and precedex. Given 4L IVF with 1L urine output.   Extubated overnight. Sensation and motor function intact RUE. L femoral arterial line removed. Substernal CT removed.     FAMILY HISTORY:  PAST MEDICAL & SURGICAL HISTORY:  No pertinent past medical history  Back pain        Patient is a 49y old  Male who presents with a chief complaint of CAD (22 Nov 2024 22:05)      14 system review was unremarkable    Vital signs, hemodynamic and respiratory parameters were reviewed from the bedside nursing flowsheet.  ICU Vital Signs Last 24 Hrs  T(C): 36.4 (23 Nov 2024 09:00), Max: 36.9 (23 Nov 2024 05:11)  T(F): 97.5 (23 Nov 2024 09:00), Max: 98.5 (23 Nov 2024 05:11)  HR: 78 (23 Nov 2024 10:00) (72 - 90)  BP: 163/75 (23 Nov 2024 10:00) (157/87 - 163/75)  BP(mean): 108 (23 Nov 2024 10:00) (100 - 112)  ABP: 127/66 (23 Nov 2024 06:00) (90/50 - 152/78)  ABP(mean): 83 (23 Nov 2024 06:00) (64 - 104)  RR: 18 (23 Nov 2024 10:00) (10 - 24)  SpO2: 94% (23 Nov 2024 10:00) (92% - 100%)    O2 Parameters below as of 23 Nov 2024 11:00  Patient On (Oxygen Delivery Method): nasal cannula w/ humidification  O2 Flow (L/min): 6        Adult Advanced Hemodynamics Last 24 Hrs  CVP(mm Hg): 178 (23 Nov 2024 10:00) (2 - 178)  CVP(cm H2O): --  CO: --  CI: --  PA: --  PA(mean): --  PCWP: --  SVR: --  SVRI: --  PVR: --  PVRI: --, ABG - ( 23 Nov 2024 02:20 )  pH, Arterial: 7.46  pH, Blood: x     /  pCO2: 39    /  pO2: 91    / HCO3: 28    / Base Excess: 3.7   /  SaO2: 98.3              Mode: AC/ CMV (Assist Control/ Continuous Mandatory Ventilation)  RR (machine): 10  TV (machine): 500  FiO2: 50  PEEP: 5  ITime: 1  MAP: 9  PIP: 20    Intake and output was reviewed and the fluid balance was calculated  Daily     Daily   I&O's Summary    22 Nov 2024 07:01  -  23 Nov 2024 07:00  --------------------------------------------------------  IN: 1330 mL / OUT: 2638 mL / NET: -1308 mL    23 Nov 2024 07:01  -  23 Nov 2024 10:53  --------------------------------------------------------  IN: 250 mL / OUT: 187 mL / NET: 63 mL        All lines and drain sites were assessed  Glycemic trend was reviewedCAPILLARY BLOOD GLUCOSE      POCT Blood Glucose.: 138 mg/dL (22 Nov 2024 05:44)    Neuro: sedated  HEENT: ett  Heart: s1 s2  Lungs: clear bl  Abdomen: soft nt nd  Extremities: RUE swollen with wound vac, unable to palpate radial pulse, warm le    Lines:  RIJ TLC 11/22      Tubes:  L pleural  R pleural    labs  CBC Full  -  ( 23 Nov 2024 10:08 )  WBC Count : 15.08 K/uL  RBC Count : 3.02 M/uL  Hemoglobin : 9.0 g/dL  Hematocrit : 28.0 %  Platelet Count - Automated : 184 K/uL  Mean Cell Volume : 92.7 fl  Mean Cell Hemoglobin : 29.8 pg  Mean Cell Hemoglobin Concentration : 32.1 g/dL  Auto Neutrophil # : 11.83 K/uL  Auto Lymphocyte # : 1.83 K/uL  Auto Monocyte # : 1.26 K/uL  Auto Eosinophil # : 0.00 K/uL  Auto Basophil # : 0.02 K/uL  Auto Neutrophil % : 78.5 %  Auto Lymphocyte % : 12.1 %  Auto Monocyte % : 8.4 %  Auto Eosinophil % : 0.0 %  Auto Basophil % : 0.1 %    11-23    138  |  102  |  16  ----------------------------<  158[H]  4.2   |  26  |  1.32[H]    Ca    8.4      23 Nov 2024 02:20  Phos  3.8     11-23  Mg     1.9     11-23    TPro  5.7[L]  /  Alb  3.6  /  TBili  0.5  /  DBili  x   /  AST  116[H]  /  ALT  62[H]  /  AlkPhos  50  11-23    PT/INR - ( 23 Nov 2024 10:08 )   PT: 13.1 sec;   INR: 1.14          PTT - ( 23 Nov 2024 10:08 )  PTT:30.6 sec  The current medications were reviewed   MEDICATIONS  (STANDING):  acetaminophen     Tablet .. 1000 milliGRAM(s) Oral every 6 hours  acetaminophen   IVPB .. 1000 milliGRAM(s) IV Intermittent every 6 hours  ascorbic acid 500 milliGRAM(s) Oral two times a day  aspirin enteric coated 81 milliGRAM(s) Oral daily  ceFAZolin   IVPB 2000 milliGRAM(s) IV Intermittent every 8 hours  chlorhexidine 2% Cloths 1 Application(s) Topical daily  dextrose 5%. 1000 milliLiter(s) (50 mL/Hr) IV Continuous <Continuous>  dextrose 5%. 1000 milliLiter(s) (100 mL/Hr) IV Continuous <Continuous>  dextrose 50% Injectable 25 Gram(s) IV Push once  dextrose 50% Injectable 12.5 Gram(s) IV Push once  dextrose 50% Injectable 25 Gram(s) IV Push once  gabapentin 100 milliGRAM(s) Oral every 8 hours  glucagon  Injectable 1 milliGRAM(s) IntraMuscular once  heparin   Injectable 5000 Unit(s) SubCutaneous every 8 hours  influenza   Vaccine 0.5 milliLiter(s) IntraMuscular once  insulin lispro (ADMELOG) corrective regimen sliding scale   SubCutaneous three times a day before meals  mupirocin 2% Ointment 1 Application(s) Both Nostrils two times a day  pantoprazole    Tablet 40 milliGRAM(s) Oral before breakfast  polyethylene glycol 3350 17 Gram(s) Oral daily  senna 2 Tablet(s) Oral at bedtime  sodium chloride 0.9%. 1000 milliLiter(s) (10 mL/Hr) IV Continuous <Continuous>    MEDICATIONS  (PRN):  dextrose Oral Gel 15 Gram(s) Oral once PRN Blood Glucose LESS THAN 70 milliGRAM(s)/deciliter  oxyCODONE    IR 5 milliGRAM(s) Oral every 4 hours PRN Moderate Pain (4 - 6)      Assessment/Plan:         s/p cardiac surgery    Acute systolic and diastolic heart failure evidenced by SOB and parenchymal infiltrates; will treat with diuresis    Cardiogenic shock on ionotropy    Vasogenic shock due to hypotension in the cticu , will keep on pressors    Hypovolemic shock - > 20% intravascular depletion will replete volume    Acute blood loss anemia with relative hypotension treated with > 1 unit PC    Acute respiratory failure ruled in due to hypoxemia, O2 sats < 91% on RA treated with HFNC    Acute respiratory failure ruled in due to hypercapnea on abg will treat with mechanical ventilation    Acute respiratory failure ruled in due to prolonged mechanical ventilation > 24 hrs on the vent due to failure to wean due to weak respiratory mechanics    Toxic metabolic encephalopathy ; sundowning due to anesthesia pain medications    Acidosis evidenced by anion gap and negative base excess    Acute kidney injury - creatinine > 0.3 due to combined prerenal and intrarenal factors can presume ATN    ESRD    Acute blayne-operative ischemic stroke    Moderate protein calorie malutrition    Diet as tolerated  Replete lytes prn  Monitor CT output  GI/DVT PPX  Bowel Regimen  Pain control  OOB with PT    Titrate pressor support to maintain MAP >70  Titrate inotrope support to maintain CI >2.2/MVO2 >60  Close hemodynamic, ventilatory and drain monitoring and management per post op routine  Monitor for arrhythmias and monitor parameters for organ perfusion  Beta blockade not administered due to hemodynamic instability and bradycardia  Monitor neurologic status  Head of the bed should remain elevated to 45 deg   Chest PT and IS will be encouraged  Monitor adequacy of oxygenation and ventilation and attempt to wean oxygen  Antibiotic regimen will be tailored to the clinical, laboratory and microbiologic data  Nutritional goals will be met using po eventually, ensure adequate caloric intake and monitor the same  Stress ulcer and VTE prophylaxis will be achieved    Glycemic control is satisfactory  Electrolytes have been repleted as necessary and wound care has been carried out   Pain control has been achieved.   Aggressive physical therapy and early mobility and ambulation goals will be met   The family was updated about the course and plan.    CRITICAL CARE TIME personally provided by me  in evaluation and management, reassessments, review and interpretation of labs and x-rays, ventilator and hemodynamic management, formulating a plan and coordinating care: ___105____ MIN.  Time does not include procedural time.         CTICU ATTENDING     					  Shane Morillo MD CTICU  CRITICAL  CARE  attending     Hand off received 					   Pertinent clinical, laboratory, radiographic, hemodynamic, echocardiographic, respiratory data, microbiologic data and chart were reviewed and analyzed frequently throughout the course of the day  Patient seen and examined with CTS/ SH attending at bedside  Pt is a 49yr old male with no reported PMH initially presented to [facility] 11/17 for CP. Found to have troponemia and was admitted for ACS. Cath on 11/18 revealed 3V CAD for which pt was tx to [facility] 11/18 for surgical evaluation. Pt is undergoing preoperative planning. Pt underwent CABG x 4 (LIMA-LAD, Radial-OM, SVG-OM, LADI-RCA, EF 45-50%; CBP 198min, XC 108min) with Dr. Dyson 11/22/24. Procedure cb by hematoma in RUE 2/2 R radial arterial line requiring intraop vascular consult and fasciotomy. Arrived to CTICU intubated on propofol and precedex. Given 4L IVF with 1L urine output.   Extubated overnight. Sensation and motor function intact RUE. L femoral arterial line removed. Substernal CT removed.     FAMILY HISTORY:  PAST MEDICAL & SURGICAL HISTORY:  No pertinent past medical history  Back pain        Patient is a 49y old  Male who presents with a chief complaint of CAD (22 Nov 2024 22:05)      14 system review was unremarkable    Vital signs, hemodynamic and respiratory parameters were reviewed from the bedside nursing flowsheet.  ICU Vital Signs Last 24 Hrs  T(C): 36.4 (23 Nov 2024 09:00), Max: 36.9 (23 Nov 2024 05:11)  T(F): 97.5 (23 Nov 2024 09:00), Max: 98.5 (23 Nov 2024 05:11)  HR: 78 (23 Nov 2024 10:00) (72 - 90)  BP: 163/75 (23 Nov 2024 10:00) (157/87 - 163/75)  BP(mean): 108 (23 Nov 2024 10:00) (100 - 112)  ABP: 127/66 (23 Nov 2024 06:00) (90/50 - 152/78)  ABP(mean): 83 (23 Nov 2024 06:00) (64 - 104)  RR: 18 (23 Nov 2024 10:00) (10 - 24)  SpO2: 94% (23 Nov 2024 10:00) (92% - 100%)    O2 Parameters below as of 23 Nov 2024 11:00  Patient On (Oxygen Delivery Method): nasal cannula w/ humidification  O2 Flow (L/min): 6        Adult Advanced Hemodynamics Last 24 Hrs  CVP(mm Hg): 178 (23 Nov 2024 10:00) (2 - 178)  CVP(cm H2O): --  CO: --  CI: --  PA: --  PA(mean): --  PCWP: --  SVR: --  SVRI: --  PVR: --  PVRI: --, ABG - ( 23 Nov 2024 02:20 )  pH, Arterial: 7.46  pH, Blood: x     /  pCO2: 39    /  pO2: 91    / HCO3: 28    / Base Excess: 3.7   /  SaO2: 98.3              Mode: AC/ CMV (Assist Control/ Continuous Mandatory Ventilation)  RR (machine): 10  TV (machine): 500  FiO2: 50  PEEP: 5  ITime: 1  MAP: 9  PIP: 20    Intake and output was reviewed and the fluid balance was calculated  Daily     Daily   I&O's Summary    22 Nov 2024 07:01  -  23 Nov 2024 07:00  --------------------------------------------------------  IN: 1330 mL / OUT: 2638 mL / NET: -1308 mL    23 Nov 2024 07:01  -  23 Nov 2024 10:53  --------------------------------------------------------  IN: 250 mL / OUT: 187 mL / NET: 63 mL        All lines and drain sites were assessed  Glycemic trend was reviewedCAPILLARY BLOOD GLUCOSE      POCT Blood Glucose.: 138 mg/dL (22 Nov 2024 05:44)    Neuro: sitting in chair, breathing short and shallow 2/2 chest discomfort  HEENT: hfnc  Heart: s1 s2  Lungs: clear bl  Abdomen: soft nt nd  Extremities: RUE swollen with wound vac, sensation and motor intact throughout    Lines:  RIJ TLC 11/22      Tubes:  L pleural  R pleural    labs  CBC Full  -  ( 23 Nov 2024 10:08 )  WBC Count : 15.08 K/uL  RBC Count : 3.02 M/uL  Hemoglobin : 9.0 g/dL  Hematocrit : 28.0 %  Platelet Count - Automated : 184 K/uL  Mean Cell Volume : 92.7 fl  Mean Cell Hemoglobin : 29.8 pg  Mean Cell Hemoglobin Concentration : 32.1 g/dL  Auto Neutrophil # : 11.83 K/uL  Auto Lymphocyte # : 1.83 K/uL  Auto Monocyte # : 1.26 K/uL  Auto Eosinophil # : 0.00 K/uL  Auto Basophil # : 0.02 K/uL  Auto Neutrophil % : 78.5 %  Auto Lymphocyte % : 12.1 %  Auto Monocyte % : 8.4 %  Auto Eosinophil % : 0.0 %  Auto Basophil % : 0.1 %    11-23    138  |  102  |  16  ----------------------------<  158[H]  4.2   |  26  |  1.32[H]    Ca    8.4      23 Nov 2024 02:20  Phos  3.8     11-23  Mg     1.9     11-23    TPro  5.7[L]  /  Alb  3.6  /  TBili  0.5  /  DBili  x   /  AST  116[H]  /  ALT  62[H]  /  AlkPhos  50  11-23    PT/INR - ( 23 Nov 2024 10:08 )   PT: 13.1 sec;   INR: 1.14          PTT - ( 23 Nov 2024 10:08 )  PTT:30.6 sec  The current medications were reviewed   MEDICATIONS  (STANDING):  acetaminophen     Tablet .. 1000 milliGRAM(s) Oral every 6 hours  acetaminophen   IVPB .. 1000 milliGRAM(s) IV Intermittent every 6 hours  ascorbic acid 500 milliGRAM(s) Oral two times a day  aspirin enteric coated 81 milliGRAM(s) Oral daily  ceFAZolin   IVPB 2000 milliGRAM(s) IV Intermittent every 8 hours  chlorhexidine 2% Cloths 1 Application(s) Topical daily  dextrose 5%. 1000 milliLiter(s) (50 mL/Hr) IV Continuous <Continuous>  dextrose 5%. 1000 milliLiter(s) (100 mL/Hr) IV Continuous <Continuous>  dextrose 50% Injectable 25 Gram(s) IV Push once  dextrose 50% Injectable 12.5 Gram(s) IV Push once  dextrose 50% Injectable 25 Gram(s) IV Push once  gabapentin 100 milliGRAM(s) Oral every 8 hours  glucagon  Injectable 1 milliGRAM(s) IntraMuscular once  heparin   Injectable 5000 Unit(s) SubCutaneous every 8 hours  influenza   Vaccine 0.5 milliLiter(s) IntraMuscular once  insulin lispro (ADMELOG) corrective regimen sliding scale   SubCutaneous three times a day before meals  mupirocin 2% Ointment 1 Application(s) Both Nostrils two times a day  pantoprazole    Tablet 40 milliGRAM(s) Oral before breakfast  polyethylene glycol 3350 17 Gram(s) Oral daily  senna 2 Tablet(s) Oral at bedtime  sodium chloride 0.9%. 1000 milliLiter(s) (10 mL/Hr) IV Continuous <Continuous>    MEDICATIONS  (PRN):  dextrose Oral Gel 15 Gram(s) Oral once PRN Blood Glucose LESS THAN 70 milliGRAM(s)/deciliter  oxyCODONE    IR 5 milliGRAM(s) Oral every 4 hours PRN Moderate Pain (4 - 6)      Assessment/Plan:  49yr old male with no reported PMH initially presented to [facility] 11/17 for CP. Found to have troponemia and was admitted for ACS. Cath on 11/18 revealed 3V CAD for which pt was tx to [facility] 11/18 for surgical evaluation. Pt is undergoing preoperative planning.     POD1 CABG x 4 (LIMA-LAD, Radial-OM, SVG-OM, LADI-RCA, EF 45-50%; CBP 198min, XC 108min, [place], 11/22/24)  Post op splinting requiring HFNC-titrate to SPO2>95%  Acute post operative anemia due to acute blood loss-trend H/H  RUE hematoma sp vascular involvement  Wound vac per vascular team  Aspirin  Statin  Periop abx  Start bblocker-uptitrate prn  Lasix prn  Diet as tolerated  Replete lytes prn  Monitor CT output  GI/DVT PPX  Bowel Regimen  Pain control  OOB with PT  Close hemodynamic, ventilatory and drain monitoring and management per post op routine  Monitor for arrhythmias and monitor parameters for organ perfusion  Monitor neurologic status  Head of the bed should remain elevated to 45 deg   Chest PT and IS will be encouraged  Monitor adequacy of oxygenation and ventilation and attempt to wean oxygen  Antibiotic regimen will be tailored to the clinical, laboratory and microbiologic data  Nutritional goals will be met using po eventually, ensure adequate caloric intake and monitor the same  Stress ulcer and VTE prophylaxis will be achieved    Glycemic control is satisfactory  Electrolytes have been repleted as necessary and wound care has been carried out   Pain control has been achieved.   Aggressive physical therapy and early mobility and ambulation goals will be met   The family was updated about the course and plan.    CRITICAL CARE TIME personally provided by me  in evaluation and management, reassessments, review and interpretation of labs and x-rays, ventilator and hemodynamic management, formulating a plan and coordinating care: ___105____ MIN.  Time does not include procedural time.         CTICU ATTENDING     					  Shane Morillo MD

## 2024-11-23 NOTE — PHYSICAL THERAPY INITIAL EVALUATION ADULT - GENERAL OBSERVATIONS, REHAB EVAL
Patient encountered out of bed sitting in bedside chair in no apparent distress, +EKG +cici +central line +R forearm woundvac +2x chest tube to wall suction +solorzano  +temporary pacemaker +NC @6L/min. Wife present at bedside. Patient c/o 8/10 pain at MSI.

## 2024-11-23 NOTE — CHART NOTE - NSCHARTNOTEFT_GEN_A_CORE
CT Removal:    Pt seen and examined at bedside.  Case discussed with Dr. Stark and is recommending removal of R pleural CT.  Minimal output from CT.  No air leak appreciated.  CT removed without incident.  Occlusive dressing placed. Follow up CXR with no obvious PTX noted.  Pt tolerated procedure well and remained hemodynamically stable.

## 2024-11-23 NOTE — PHYSICAL THERAPY INITIAL EVALUATION ADULT - PERTINENT HX OF CURRENT PROBLEM, REHAB EVAL
49M, current smoker (30 pack years), with no PMHx who presented to [facility] on 11/17 with chest pain radiating to back/shoulders x 2 days. On arrival to ED, he was given ASA and Brilinta 180 mg and Plavix 300 mg. Troponin were elevated, patient was placed on heparin gtt and admitted to telemetry unit at [facility]. On 11/18, he had cardiac cath via RRA which demonstrated 3vCAD. Patient was transferred to [facility] under  for CABG. Patient has completed PST, continued on hep gtt for CAD and planned for CABG tomorrow (11/22/24) with . Patient is s/p CABGx4 and R forearm fasciotomy on 11/22/2024.

## 2024-11-24 LAB
ALBUMIN SERPL ELPH-MCNC: 3.1 G/DL — LOW (ref 3.3–5)
ALBUMIN SERPL ELPH-MCNC: 3.7 G/DL — SIGNIFICANT CHANGE UP (ref 3.3–5)
ALBUMIN SERPL ELPH-MCNC: 3.7 G/DL — SIGNIFICANT CHANGE UP (ref 3.3–5)
ALP SERPL-CCNC: 54 U/L — SIGNIFICANT CHANGE UP (ref 40–120)
ALP SERPL-CCNC: 54 U/L — SIGNIFICANT CHANGE UP (ref 40–120)
ALP SERPL-CCNC: 56 U/L — SIGNIFICANT CHANGE UP (ref 40–120)
ALT FLD-CCNC: 28 U/L — SIGNIFICANT CHANGE UP (ref 10–45)
ALT FLD-CCNC: 37 U/L — SIGNIFICANT CHANGE UP (ref 10–45)
ALT FLD-CCNC: 39 U/L — SIGNIFICANT CHANGE UP (ref 10–45)
ANION GAP SERPL CALC-SCNC: 11 MMOL/L — SIGNIFICANT CHANGE UP (ref 5–17)
ANION GAP SERPL CALC-SCNC: 8 MMOL/L — SIGNIFICANT CHANGE UP (ref 5–17)
ANION GAP SERPL CALC-SCNC: 9 MMOL/L — SIGNIFICANT CHANGE UP (ref 5–17)
APTT BLD: 28.8 SEC — SIGNIFICANT CHANGE UP (ref 24.5–35.6)
APTT BLD: 30.1 SEC — SIGNIFICANT CHANGE UP (ref 24.5–35.6)
APTT BLD: 39 SEC — HIGH (ref 24.5–35.6)
AST SERPL-CCNC: 48 U/L — HIGH (ref 10–40)
AST SERPL-CCNC: 62 U/L — HIGH (ref 10–40)
AST SERPL-CCNC: 65 U/L — HIGH (ref 10–40)
BASOPHILS # BLD AUTO: 0.03 K/UL — SIGNIFICANT CHANGE UP (ref 0–0.2)
BASOPHILS # BLD AUTO: 0.03 K/UL — SIGNIFICANT CHANGE UP (ref 0–0.2)
BASOPHILS NFR BLD AUTO: 0.1 % — SIGNIFICANT CHANGE UP (ref 0–2)
BASOPHILS NFR BLD AUTO: 0.2 % — SIGNIFICANT CHANGE UP (ref 0–2)
BILIRUB SERPL-MCNC: 0.3 MG/DL — SIGNIFICANT CHANGE UP (ref 0.2–1.2)
BILIRUB SERPL-MCNC: 0.6 MG/DL — SIGNIFICANT CHANGE UP (ref 0.2–1.2)
BILIRUB SERPL-MCNC: 0.6 MG/DL — SIGNIFICANT CHANGE UP (ref 0.2–1.2)
BUN SERPL-MCNC: 20 MG/DL — SIGNIFICANT CHANGE UP (ref 7–23)
BUN SERPL-MCNC: 21 MG/DL — SIGNIFICANT CHANGE UP (ref 7–23)
BUN SERPL-MCNC: 21 MG/DL — SIGNIFICANT CHANGE UP (ref 7–23)
CALCIUM SERPL-MCNC: 7.6 MG/DL — LOW (ref 8.4–10.5)
CALCIUM SERPL-MCNC: 8 MG/DL — LOW (ref 8.4–10.5)
CALCIUM SERPL-MCNC: 8.2 MG/DL — LOW (ref 8.4–10.5)
CHLORIDE SERPL-SCNC: 88 MMOL/L — LOW (ref 96–108)
CHLORIDE SERPL-SCNC: 96 MMOL/L — SIGNIFICANT CHANGE UP (ref 96–108)
CHLORIDE SERPL-SCNC: 97 MMOL/L — SIGNIFICANT CHANGE UP (ref 96–108)
CO2 SERPL-SCNC: 27 MMOL/L — SIGNIFICANT CHANGE UP (ref 22–31)
CO2 SERPL-SCNC: 29 MMOL/L — SIGNIFICANT CHANGE UP (ref 22–31)
CO2 SERPL-SCNC: 29 MMOL/L — SIGNIFICANT CHANGE UP (ref 22–31)
CREAT SERPL-MCNC: 1.16 MG/DL — SIGNIFICANT CHANGE UP (ref 0.5–1.3)
CREAT SERPL-MCNC: 1.21 MG/DL — SIGNIFICANT CHANGE UP (ref 0.5–1.3)
CREAT SERPL-MCNC: 1.3 MG/DL — SIGNIFICANT CHANGE UP (ref 0.5–1.3)
EGFR: 67 ML/MIN/1.73M2 — SIGNIFICANT CHANGE UP
EGFR: 73 ML/MIN/1.73M2 — SIGNIFICANT CHANGE UP
EGFR: 77 ML/MIN/1.73M2 — SIGNIFICANT CHANGE UP
EOSINOPHIL # BLD AUTO: 0 K/UL — SIGNIFICANT CHANGE UP (ref 0–0.5)
EOSINOPHIL # BLD AUTO: 0.01 K/UL — SIGNIFICANT CHANGE UP (ref 0–0.5)
EOSINOPHIL NFR BLD AUTO: 0 % — SIGNIFICANT CHANGE UP (ref 0–6)
EOSINOPHIL NFR BLD AUTO: 0.1 % — SIGNIFICANT CHANGE UP (ref 0–6)
GLUCOSE SERPL-MCNC: 145 MG/DL — HIGH (ref 70–99)
GLUCOSE SERPL-MCNC: 182 MG/DL — HIGH (ref 70–99)
GLUCOSE SERPL-MCNC: 382 MG/DL — HIGH (ref 70–99)
HCT VFR BLD CALC: 22.5 % — LOW (ref 39–50)
HCT VFR BLD CALC: 25.4 % — LOW (ref 39–50)
HCT VFR BLD CALC: 26.2 % — LOW (ref 39–50)
HGB BLD-MCNC: 7.1 G/DL — LOW (ref 13–17)
HGB BLD-MCNC: 8.5 G/DL — LOW (ref 13–17)
HGB BLD-MCNC: 8.6 G/DL — LOW (ref 13–17)
IMM GRANULOCYTES NFR BLD AUTO: 1.3 % — HIGH (ref 0–0.9)
IMM GRANULOCYTES NFR BLD AUTO: 1.5 % — HIGH (ref 0–0.9)
INR BLD: 1.06 — SIGNIFICANT CHANGE UP (ref 0.85–1.16)
INR BLD: 1.09 — SIGNIFICANT CHANGE UP (ref 0.85–1.16)
INR BLD: 1.17 — HIGH (ref 0.85–1.16)
LYMPHOCYTES # BLD AUTO: 13.6 % — SIGNIFICANT CHANGE UP (ref 13–44)
LYMPHOCYTES # BLD AUTO: 17.6 % — SIGNIFICANT CHANGE UP (ref 13–44)
LYMPHOCYTES # BLD AUTO: 2.81 K/UL — SIGNIFICANT CHANGE UP (ref 1–3.3)
LYMPHOCYTES # BLD AUTO: 3.07 K/UL — SIGNIFICANT CHANGE UP (ref 1–3.3)
MAGNESIUM SERPL-MCNC: 2.2 MG/DL — SIGNIFICANT CHANGE UP (ref 1.6–2.6)
MCHC RBC-ENTMCNC: 29.8 PG — SIGNIFICANT CHANGE UP (ref 27–34)
MCHC RBC-ENTMCNC: 31 PG — SIGNIFICANT CHANGE UP (ref 27–34)
MCHC RBC-ENTMCNC: 31.6 G/DL — LOW (ref 32–36)
MCHC RBC-ENTMCNC: 31.7 PG — SIGNIFICANT CHANGE UP (ref 27–34)
MCHC RBC-ENTMCNC: 32.8 G/DL — SIGNIFICANT CHANGE UP (ref 32–36)
MCHC RBC-ENTMCNC: 33.5 G/DL — SIGNIFICANT CHANGE UP (ref 32–36)
MCV RBC AUTO: 94.5 FL — SIGNIFICANT CHANGE UP (ref 80–100)
MCV RBC AUTO: 94.6 FL — SIGNIFICANT CHANGE UP (ref 80–100)
MCV RBC AUTO: 94.8 FL — SIGNIFICANT CHANGE UP (ref 80–100)
MONOCYTES # BLD AUTO: 1.19 K/UL — HIGH (ref 0–0.9)
MONOCYTES # BLD AUTO: 1.5 K/UL — HIGH (ref 0–0.9)
MONOCYTES NFR BLD AUTO: 6.8 % — SIGNIFICANT CHANGE UP (ref 2–14)
MONOCYTES NFR BLD AUTO: 7.3 % — SIGNIFICANT CHANGE UP (ref 2–14)
NEUTROPHILS # BLD AUTO: 12.91 K/UL — HIGH (ref 1.8–7.4)
NEUTROPHILS # BLD AUTO: 16.04 K/UL — HIGH (ref 1.8–7.4)
NEUTROPHILS NFR BLD AUTO: 73.8 % — SIGNIFICANT CHANGE UP (ref 43–77)
NEUTROPHILS NFR BLD AUTO: 77.7 % — HIGH (ref 43–77)
NRBC # BLD: 0 /100 WBCS — SIGNIFICANT CHANGE UP (ref 0–0)
PHOSPHATE SERPL-MCNC: 1.7 MG/DL — LOW (ref 2.5–4.5)
PHOSPHATE SERPL-MCNC: 2.2 MG/DL — LOW (ref 2.5–4.5)
PHOSPHATE SERPL-MCNC: 2.3 MG/DL — LOW (ref 2.5–4.5)
PLATELET # BLD AUTO: 159 K/UL — SIGNIFICANT CHANGE UP (ref 150–400)
PLATELET # BLD AUTO: 163 K/UL — SIGNIFICANT CHANGE UP (ref 150–400)
PLATELET # BLD AUTO: 178 K/UL — SIGNIFICANT CHANGE UP (ref 150–400)
POTASSIUM SERPL-MCNC: 3.8 MMOL/L — SIGNIFICANT CHANGE UP (ref 3.5–5.3)
POTASSIUM SERPL-MCNC: 4.1 MMOL/L — SIGNIFICANT CHANGE UP (ref 3.5–5.3)
POTASSIUM SERPL-MCNC: 4.2 MMOL/L — SIGNIFICANT CHANGE UP (ref 3.5–5.3)
POTASSIUM SERPL-SCNC: 3.8 MMOL/L — SIGNIFICANT CHANGE UP (ref 3.5–5.3)
POTASSIUM SERPL-SCNC: 4.1 MMOL/L — SIGNIFICANT CHANGE UP (ref 3.5–5.3)
POTASSIUM SERPL-SCNC: 4.2 MMOL/L — SIGNIFICANT CHANGE UP (ref 3.5–5.3)
PROT SERPL-MCNC: 5.9 G/DL — LOW (ref 6–8.3)
PROT SERPL-MCNC: 6 G/DL — SIGNIFICANT CHANGE UP (ref 6–8.3)
PROT SERPL-MCNC: 6.1 G/DL — SIGNIFICANT CHANGE UP (ref 6–8.3)
PROTHROM AB SERPL-ACNC: 12.4 SEC — SIGNIFICANT CHANGE UP (ref 9.9–13.4)
PROTHROM AB SERPL-ACNC: 12.5 SEC — SIGNIFICANT CHANGE UP (ref 9.9–13.4)
PROTHROM AB SERPL-ACNC: 13.7 SEC — HIGH (ref 9.9–13.4)
RBC # BLD: 2.38 M/UL — LOW (ref 4.2–5.8)
RBC # BLD: 2.68 M/UL — LOW (ref 4.2–5.8)
RBC # BLD: 2.77 M/UL — LOW (ref 4.2–5.8)
RBC # FLD: 14.1 % — SIGNIFICANT CHANGE UP (ref 10.3–14.5)
RBC # FLD: 14.2 % — SIGNIFICANT CHANGE UP (ref 10.3–14.5)
RBC # FLD: 14.4 % — SIGNIFICANT CHANGE UP (ref 10.3–14.5)
SODIUM SERPL-SCNC: 126 MMOL/L — LOW (ref 135–145)
SODIUM SERPL-SCNC: 133 MMOL/L — LOW (ref 135–145)
SODIUM SERPL-SCNC: 135 MMOL/L — SIGNIFICANT CHANGE UP (ref 135–145)
WBC # BLD: 17.47 K/UL — HIGH (ref 3.8–10.5)
WBC # BLD: 19.08 K/UL — HIGH (ref 3.8–10.5)
WBC # BLD: 20.65 K/UL — HIGH (ref 3.8–10.5)
WBC # FLD AUTO: 17.47 K/UL — HIGH (ref 3.8–10.5)
WBC # FLD AUTO: 19.08 K/UL — HIGH (ref 3.8–10.5)
WBC # FLD AUTO: 20.65 K/UL — HIGH (ref 3.8–10.5)

## 2024-11-24 PROCEDURE — 99233 SBSQ HOSP IP/OBS HIGH 50: CPT

## 2024-11-24 PROCEDURE — 71045 X-RAY EXAM CHEST 1 VIEW: CPT | Mod: 26

## 2024-11-24 RX ORDER — POTASSIUM CHLORIDE 600 MG/1
20 TABLET, EXTENDED RELEASE ORAL ONCE
Refills: 0 | Status: COMPLETED | OUTPATIENT
Start: 2024-11-24 | End: 2024-11-24

## 2024-11-24 RX ORDER — SODIUM,POTASSIUM PHOSPHATES 278-250MG
1 POWDER IN PACKET (EA) ORAL ONCE
Refills: 0 | Status: COMPLETED | OUTPATIENT
Start: 2024-11-24 | End: 2024-11-24

## 2024-11-24 RX ORDER — METOPROLOL TARTRATE 100 MG/1
2.5 TABLET, FILM COATED ORAL ONCE
Refills: 0 | Status: COMPLETED | OUTPATIENT
Start: 2024-11-24 | End: 2024-11-24

## 2024-11-24 RX ORDER — FUROSEMIDE 40 MG/1
20 TABLET ORAL ONCE
Refills: 0 | Status: COMPLETED | OUTPATIENT
Start: 2024-11-24 | End: 2024-11-25

## 2024-11-24 RX ORDER — FUROSEMIDE 40 MG/1
10 TABLET ORAL ONCE
Refills: 0 | Status: COMPLETED | OUTPATIENT
Start: 2024-11-24 | End: 2024-11-24

## 2024-11-24 RX ORDER — AMIODARONE HYDROCHLORIDE 200 MG/1
TABLET ORAL
Refills: 0 | Status: DISCONTINUED | OUTPATIENT
Start: 2024-11-24 | End: 2024-11-27

## 2024-11-24 RX ORDER — AMIODARONE HYDROCHLORIDE 200 MG/1
150 TABLET ORAL ONCE
Refills: 0 | Status: COMPLETED | OUTPATIENT
Start: 2024-11-24 | End: 2024-11-24

## 2024-11-24 RX ORDER — HEPARIN SODIUM 5000 [USP'U]/.5ML
15 INJECTION, SOLUTION INTRAVENOUS; SUBCUTANEOUS ONCE
Refills: 0 | Status: DISCONTINUED | OUTPATIENT
Start: 2024-11-24 | End: 2024-11-25

## 2024-11-24 RX ORDER — AMIODARONE HYDROCHLORIDE 200 MG/1
400 TABLET ORAL EVERY 8 HOURS
Refills: 0 | Status: DISCONTINUED | OUTPATIENT
Start: 2024-11-24 | End: 2024-11-27

## 2024-11-24 RX ORDER — AMIODARONE HYDROCHLORIDE 200 MG/1
200 TABLET ORAL DAILY
Refills: 0 | Status: DISCONTINUED | OUTPATIENT
Start: 2024-11-28 | End: 2024-11-27

## 2024-11-24 RX ORDER — METOCLOPRAMIDE HYDROCHLORIDE 10 MG/1
10 TABLET ORAL ONCE
Refills: 0 | Status: COMPLETED | OUTPATIENT
Start: 2024-11-24 | End: 2024-11-24

## 2024-11-24 RX ADMIN — METOPROLOL TARTRATE 2.5 MILLIGRAM(S): 100 TABLET, FILM COATED ORAL at 18:43

## 2024-11-24 RX ADMIN — AMIODARONE HYDROCHLORIDE 600 MILLIGRAM(S): 200 TABLET ORAL at 12:04

## 2024-11-24 RX ADMIN — ACETAMINOPHEN 500MG 1000 MILLIGRAM(S): 500 TABLET, COATED ORAL at 17:35

## 2024-11-24 RX ADMIN — Medication 100 MILLIGRAM(S): at 09:37

## 2024-11-24 RX ADMIN — ACETAMINOPHEN 500MG 1000 MILLIGRAM(S): 500 TABLET, COATED ORAL at 18:20

## 2024-11-24 RX ADMIN — POTASSIUM CHLORIDE 20 MILLIEQUIVALENT(S): 600 TABLET, EXTENDED RELEASE ORAL at 00:31

## 2024-11-24 RX ADMIN — AMIODARONE HYDROCHLORIDE 400 MILLIGRAM(S): 200 TABLET ORAL at 22:07

## 2024-11-24 RX ADMIN — METOCLOPRAMIDE HYDROCHLORIDE 10 MILLIGRAM(S): 10 TABLET ORAL at 06:28

## 2024-11-24 RX ADMIN — AMIODARONE HYDROCHLORIDE 133.33 MILLIGRAM(S): 200 TABLET ORAL at 20:45

## 2024-11-24 RX ADMIN — OXYCODONE HYDROCHLORIDE 5 MILLIGRAM(S): 30 TABLET ORAL at 06:40

## 2024-11-24 RX ADMIN — Medication 5000 UNIT(S): at 07:04

## 2024-11-24 RX ADMIN — POLYETHYLENE GLYCOL 3350 17 GRAM(S): 17 POWDER, FOR SOLUTION ORAL at 11:17

## 2024-11-24 RX ADMIN — Medication 500 MILLIGRAM(S): at 17:35

## 2024-11-24 RX ADMIN — METOPROLOL TARTRATE 12.5 MILLIGRAM(S): 100 TABLET, FILM COATED ORAL at 14:58

## 2024-11-24 RX ADMIN — GABAPENTIN 100 MILLIGRAM(S): 300 CAPSULE ORAL at 00:31

## 2024-11-24 RX ADMIN — Medication 5000 UNIT(S): at 16:44

## 2024-11-24 RX ADMIN — Medication 1 PACKET(S): at 17:52

## 2024-11-24 RX ADMIN — PANTOPRAZOLE SODIUM 40 MILLIGRAM(S): 40 TABLET, DELAYED RELEASE ORAL at 06:01

## 2024-11-24 RX ADMIN — ACETAMINOPHEN 500MG 1000 MILLIGRAM(S): 500 TABLET, COATED ORAL at 12:04

## 2024-11-24 RX ADMIN — Medication 1 APPLICATION(S): at 06:01

## 2024-11-24 RX ADMIN — OXYCODONE HYDROCHLORIDE 5 MILLIGRAM(S): 30 TABLET ORAL at 18:32

## 2024-11-24 RX ADMIN — GABAPENTIN 100 MILLIGRAM(S): 300 CAPSULE ORAL at 07:04

## 2024-11-24 RX ADMIN — AMIODARONE HYDROCHLORIDE 600 MILLIGRAM(S): 200 TABLET ORAL at 18:30

## 2024-11-24 RX ADMIN — Medication 81 MILLIGRAM(S): at 11:17

## 2024-11-24 RX ADMIN — ACETAMINOPHEN 500MG 1000 MILLIGRAM(S): 500 TABLET, COATED ORAL at 06:40

## 2024-11-24 RX ADMIN — OXYCODONE HYDROCHLORIDE 5 MILLIGRAM(S): 30 TABLET ORAL at 01:00

## 2024-11-24 RX ADMIN — Medication 2: at 11:01

## 2024-11-24 RX ADMIN — ACETAMINOPHEN 500MG 1000 MILLIGRAM(S): 500 TABLET, COATED ORAL at 11:17

## 2024-11-24 RX ADMIN — Medication 2: at 07:44

## 2024-11-24 RX ADMIN — ACETAMINOPHEN 500MG 1000 MILLIGRAM(S): 500 TABLET, COATED ORAL at 06:00

## 2024-11-24 RX ADMIN — Medication 5000 UNIT(S): at 00:31

## 2024-11-24 RX ADMIN — POTASSIUM CHLORIDE 20 MILLIEQUIVALENT(S): 600 TABLET, EXTENDED RELEASE ORAL at 22:34

## 2024-11-24 RX ADMIN — Medication 500 MILLIGRAM(S): at 05:57

## 2024-11-24 RX ADMIN — OXYCODONE HYDROCHLORIDE 5 MILLIGRAM(S): 30 TABLET ORAL at 05:58

## 2024-11-24 RX ADMIN — CHLORHEXIDINE GLUCONATE 1 APPLICATION(S): 1.2 RINSE ORAL at 11:17

## 2024-11-24 RX ADMIN — FUROSEMIDE 10 MILLIGRAM(S): 40 TABLET ORAL at 06:28

## 2024-11-24 RX ADMIN — METOPROLOL TARTRATE 12.5 MILLIGRAM(S): 100 TABLET, FILM COATED ORAL at 22:07

## 2024-11-24 RX ADMIN — OXYCODONE HYDROCHLORIDE 5 MILLIGRAM(S): 30 TABLET ORAL at 00:40

## 2024-11-24 RX ADMIN — METOPROLOL TARTRATE 12.5 MILLIGRAM(S): 100 TABLET, FILM COATED ORAL at 06:00

## 2024-11-24 RX ADMIN — Medication 100 MILLIGRAM(S): at 03:23

## 2024-11-24 RX ADMIN — GABAPENTIN 100 MILLIGRAM(S): 300 CAPSULE ORAL at 16:44

## 2024-11-24 NOTE — PROGRESS NOTE ADULT - SUBJECTIVE AND OBJECTIVE BOX
CTICU  CRITICAL  CARE  attending     Hand off received 					   Pertinent clinical, laboratory, radiographic, hemodynamic, echocardiographic, respiratory data, microbiologic data and chart were reviewed and analyzed frequently throughout the course of the day  Patient seen and examined with CTS/ SH attending at bedside  Pt is a 49yr old male with no reported PMH initially presented to [facility] 11/17 for CP. Found to have troponemia and was admitted for ACS. Cath on 11/18 revealed 3V CAD for which pt was tx to [facility] 11/18 for surgical evaluation. Pt is undergoing preoperative planning. Pt underwent CABG x 4 (LIMA-LAD, Radial-OM, SVG-OM, LADI-RCA, EF 45-50%; CBP 198min, XC 108min) with Dr. Dyson 11/22/24. Procedure cb by hematoma in RUE 2/2 R radial arterial line requiring intraop vascular consult and fasciotomy. Arrived to CTICU intubated on propofol and precedex. Given 4L IVF with 1L urine output.   Extubated overnight. Sensation and motor function intact RUE. L femoral arterial line removed. Substernal CT removed. 11/23: Remaining CT removed.       FAMILY HISTORY:  PAST MEDICAL & SURGICAL HISTORY:  No pertinent past medical history  Back pain        Patient is a 49y old  Male who presents with a chief complaint of CAD.      14 system review was unremarkable    Vital signs, hemodynamic and respiratory parameters were reviewed from the bedside nursing flowsheet.  ICU Vital Signs Last 24 Hrs  T(C): 36.4 (24 Nov 2024 05:09), Max: 36.7 (23 Nov 2024 21:12)  T(F): 97.5 (24 Nov 2024 05:09), Max: 98.1 (23 Nov 2024 21:12)  HR: 85 (24 Nov 2024 09:00) (71 - 100)  BP: 98/62 (24 Nov 2024 09:00) (98/62 - 206/87)  BP(mean): 72 (24 Nov 2024 09:00) (72 - 125)  ABP: --  ABP(mean): --  RR: 16 (24 Nov 2024 09:00) (15 - 19)  SpO2: 92% (24 Nov 2024 09:00) (86% - 96%)    O2 Parameters below as of 24 Nov 2024 09:00  Patient On (Oxygen Delivery Method): nasal cannula, high flow  O2 Flow (L/min): 50  O2 Concentration (%): 50      Adult Advanced Hemodynamics Last 24 Hrs  CVP(mm Hg): 170 (23 Nov 2024 19:00) (11 - 178)  CVP(cm H2O): --  CO: --  CI: --  PA: --  PA(mean): --  PCWP: --  SVR: --  SVRI: --  PVR: --  PVRI: --, ABG - ( 23 Nov 2024 02:20 )  pH, Arterial: 7.46  pH, Blood: x     /  pCO2: 39    /  pO2: 91    / HCO3: 28    / Base Excess: 3.7   /  SaO2: 98.3                Intake and output was reviewed and the fluid balance was calculated  Daily     Daily   I&O's Summary    23 Nov 2024 07:01  -  24 Nov 2024 07:00  --------------------------------------------------------  IN: 1135 mL / OUT: 1802 mL / NET: -667 mL    24 Nov 2024 07:01  -  24 Nov 2024 09:48  --------------------------------------------------------  IN: 100 mL / OUT: 185 mL / NET: -85 mL        All lines and drain sites were assessed  [facility]      POCT Blood Glucose.: 151 mg/dL (24 Nov 2024 07:39)    Neuro: sitting in chair, breathing short and shallow 2/2 chest discomfort  HEENT: hfnc  Heart: s1 s2  Lungs: clear bl  Abdomen: soft nt nd  Extremities: RUE swollen with wound vac, sensation and motor intact throughout  2+ radial pulse    Lines:  RIJ TLC 11/22        labs  CBC Full  -  ( 24 Nov 2024 08:58 )  WBC Count : 20.65 K/uL  RBC Count : 2.68 M/uL  Hemoglobin : 8.5 g/dL  Hematocrit : 25.4 %  Platelet Count - Automated : 178 K/uL  Mean Cell Volume : 94.8 fl  Mean Cell Hemoglobin : 31.7 pg  Mean Cell Hemoglobin Concentration : 33.5 g/dL  Auto Neutrophil # : 16.04 K/uL  Auto Lymphocyte # : 2.81 K/uL  Auto Monocyte # : 1.50 K/uL  Auto Eosinophil # : 0.00 K/uL  Auto Basophil # : 0.03 K/uL  Auto Neutrophil % : 77.7 %  Auto Lymphocyte % : 13.6 %  Auto Monocyte % : 7.3 %  Auto Eosinophil % : 0.0 %  Auto Basophil % : 0.1 %    11-24    133[L]  |  96  |  20  ----------------------------<  182[H]  4.2   |  29  |  1.30    Ca    8.2[L]      24 Nov 2024 08:58  Phos  2.2     11-24  Mg     2.2     11-24    TPro  6.1  /  Alb  3.7  /  TBili  0.6  /  DBili  x   /  AST  62[H]  /  ALT  37  /  AlkPhos  56  11-24    PT/INR - ( 24 Nov 2024 08:58 )   PT: 12.4 sec;   INR: 1.06          PTT - ( 24 Nov 2024 08:58 )  PTT:30.1 sec  The current medications were reviewed   MEDICATIONS  (STANDING):  acetaminophen     Tablet .. 1000 milliGRAM(s) Oral every 6 hours  ascorbic acid 500 milliGRAM(s) Oral two times a day  aspirin enteric coated 81 milliGRAM(s) Oral daily  bisacodyl Suppository 10 milliGRAM(s) Rectal once  chlorhexidine 2% Cloths 1 Application(s) Topical daily  dextrose 5%. 1000 milliLiter(s) (50 mL/Hr) IV Continuous <Continuous>  dextrose 5%. 1000 milliLiter(s) (100 mL/Hr) IV Continuous <Continuous>  dextrose 50% Injectable 25 Gram(s) IV Push once  dextrose 50% Injectable 12.5 Gram(s) IV Push once  dextrose 50% Injectable 25 Gram(s) IV Push once  gabapentin 100 milliGRAM(s) Oral every 8 hours  glucagon  Injectable 1 milliGRAM(s) IntraMuscular once  heparin   Injectable 5000 Unit(s) SubCutaneous every 8 hours  influenza   Vaccine 0.5 milliLiter(s) IntraMuscular once  insulin lispro (ADMELOG) corrective regimen sliding scale   SubCutaneous three times a day before meals  metoprolol tartrate 12.5 milliGRAM(s) Oral every 8 hours  mupirocin 2% Ointment 1 Application(s) Both Nostrils two times a day  pantoprazole    Tablet 40 milliGRAM(s) Oral before breakfast  polyethylene glycol 3350 17 Gram(s) Oral daily  senna 2 Tablet(s) Oral at bedtime  sodium chloride 0.9%. 1000 milliLiter(s) (10 mL/Hr) IV Continuous <Continuous>    MEDICATIONS  (PRN):  dextrose Oral Gel 15 Gram(s) Oral once PRN Blood Glucose LESS THAN 70 milliGRAM(s)/deciliter  oxyCODONE    IR 5 milliGRAM(s) Oral every 4 hours PRN Moderate Pain (4 - 6)      Assessment/Plan:  49yr old male with no reported PMH initially presented to [facility] 11/17 for CP. Found to have troponemia and was admitted for ACS. Cath on 11/18 revealed 3V CAD for which pt was tx to [facility] 11/18 for surgical evaluation. Pt is undergoing preoperative planning.     POD2 CABG x 4 (LIMA-LAD, Radial-OM, SVG-OM, LADI-RCA, EF 45-50%; CBP 198min, XC 108min, [place], 11/22/24)  Post op splinting requiring HFNC-titrate to SPO2>95%  Acute post operative anemia due to acute blood loss-trend H/H  RUE hematoma sp vascular involvement  Wound vac per vascular team  Aspirin  Statin  BBlocker-uptitrate prn  Lasix prn  Diet as tolerated  Replete lytes prn  GI/DVT PPX  Bowel Regimen  Pain control  OOB with PT  Close hemodynamic, ventilatory and drain monitoring and management per post op routine  Monitor for arrhythmias and monitor parameters for organ perfusion  Monitor neurologic status  Head of the bed should remain elevated to 45 deg   Chest PT and IS will be encouraged  Monitor adequacy of oxygenation and ventilation and attempt to wean oxygen  Antibiotic regimen will be tailored to the clinical, laboratory and microbiologic data  Nutritional goals will be met using po eventually, ensure adequate caloric intake and monitor the same  Stress ulcer and VTE prophylaxis will be achieved    Glycemic control is satisfactory  Electrolytes have been repleted as necessary and wound care has been carried out   Pain control has been achieved.   Aggressive physical therapy and early mobility and ambulation goals will be met   The family was updated about the course and plan.    CRITICAL CARE TIME personally provided by me  in evaluation and management, reassessments, review and interpretation of labs and x-rays, ventilator and hemodynamic management, formulating a plan and coordinating care: ___35____ MIN.  Time does not include procedural time.        CTICU ATTENDING     					  Shane Morillo MD

## 2024-11-24 NOTE — PROGRESS NOTE ADULT - SUBJECTIVE AND OBJECTIVE BOX
Patient is s/p intraoperative consult for RUE forearm compartment syndrome after possible damage to radial injury from arterial line. 5cm incision made on forearm over mid radial artery, fascia opened, decompressed. Hematoma noted and evaculated. Palpable radial pulse, palpable ulnar pulse, triphasic palmar arch and digital signals confirmed. Wound vac left in place.    S: Patient does not have any complaints    O: Examined in chair resting comfortably     aspirin enteric coated 81  heparin   Injectable 5000  metoprolol tartrate 12.5      Allergies    No Known Allergies    Intolerances        Vital Signs Last 24 Hrs  T(C): 36.5 (24 Nov 2024 09:01), Max: 36.7 (23 Nov 2024 21:12)  T(F): 97.7 (24 Nov 2024 09:01), Max: 98.1 (23 Nov 2024 21:12)  HR: 82 (24 Nov 2024 10:00) (71 - 100)  BP: 102/59 (24 Nov 2024 10:00) (98/62 - 206/87)  BP(mean): 73 (24 Nov 2024 10:00) (72 - 125)  RR: 16 (24 Nov 2024 10:00) (6 - 19)  SpO2: 96% (24 Nov 2024 10:00) (86% - 96%)    Parameters below as of 24 Nov 2024 10:00  Patient On (Oxygen Delivery Method): nasal cannula, high flow  O2 Flow (L/min): 50  O2 Concentration (%): 50  I&O's Summary    23 Nov 2024 07:01  -  24 Nov 2024 07:00  --------------------------------------------------------  IN: 1135 mL / OUT: 1802 mL / NET: -667 mL    24 Nov 2024 07:01  -  24 Nov 2024 10:29  --------------------------------------------------------  IN: 100 mL / OUT: 185 mL / NET: -85 mL        Physical Exam:  General: alert and awake, NAD  Pulmonary: no respiratory distress  Cardiovascular: RRR  Extremities: RUE VAC in place with good seal, minimal drainage, forearm soft, well perfused, mild edema, not tense.   Pulses: Triphasic/palpable radial, ulnar and palmar arch doppler signals. Hand warm and well perfused, good cap refill<2sec. Motor/sensory intact without deficits.         LABS:                        8.5    20.65 )-----------( 178      ( 24 Nov 2024 08:58 )             25.4     11-24    133[L]  |  96  |  20  ----------------------------<  182[H]  4.2   |  29  |  1.30    Ca    8.2[L]      24 Nov 2024 08:58  Phos  2.2     11-24  Mg     2.2     11-24    TPro  6.1  /  Alb  3.7  /  TBili  0.6  /  DBili  x   /  AST  62[H]  /  ALT  37  /  AlkPhos  56  11-24    PT/INR - ( 24 Nov 2024 08:58 )   PT: 12.4 sec;   INR: 1.06          PTT - ( 24 Nov 2024 08:58 )  PTT:30.1 sec      A/P: Pt is a 49yr old male with no reported PMH initially presented to [facility] 11/17 for CP. Found to have troponemia and was admitted for ACS. Cath on 11/18 revealed 3V CAD for which pt was tx to [facility] 11/18 for surgical evaluation. Pt is undergoing preoperative planning. Pt underwent CABG x 4 (LIMA-LAD, Radial-OM, SVG-OM, LADI-RCA, EF 45-50%; CBP 198min, XC 108min) with Dr. Dyson 11/22/24. Procedure c/b hematoma in RUE 2/2 R radial arterial line requiring intraop vascular consult and fasciotomy.     -Continue wound VAC  -Will plan to close fasciotomy site on Wednesday 11/27      Case d/w attending and chief resident on call.

## 2024-11-25 LAB
ALBUMIN SERPL ELPH-MCNC: 3.6 G/DL — SIGNIFICANT CHANGE UP (ref 3.3–5)
ALP SERPL-CCNC: 65 U/L — SIGNIFICANT CHANGE UP (ref 40–120)
ALT FLD-CCNC: 28 U/L — SIGNIFICANT CHANGE UP (ref 10–45)
ANION GAP SERPL CALC-SCNC: 9 MMOL/L — SIGNIFICANT CHANGE UP (ref 5–17)
APTT BLD: 30 SEC — SIGNIFICANT CHANGE UP (ref 24.5–35.6)
AST SERPL-CCNC: 50 U/L — HIGH (ref 10–40)
BASOPHILS # BLD AUTO: 0.06 K/UL — SIGNIFICANT CHANGE UP (ref 0–0.2)
BASOPHILS NFR BLD AUTO: 0.3 % — SIGNIFICANT CHANGE UP (ref 0–2)
BILIRUB SERPL-MCNC: 0.4 MG/DL — SIGNIFICANT CHANGE UP (ref 0.2–1.2)
BLD GP AB SCN SERPL QL: NEGATIVE — SIGNIFICANT CHANGE UP
BUN SERPL-MCNC: 20 MG/DL — SIGNIFICANT CHANGE UP (ref 7–23)
CALCIUM SERPL-MCNC: 7.9 MG/DL — LOW (ref 8.4–10.5)
CHLORIDE SERPL-SCNC: 97 MMOL/L — SIGNIFICANT CHANGE UP (ref 96–108)
CO2 SERPL-SCNC: 27 MMOL/L — SIGNIFICANT CHANGE UP (ref 22–31)
CREAT SERPL-MCNC: 1.18 MG/DL — SIGNIFICANT CHANGE UP (ref 0.5–1.3)
EGFR: 76 ML/MIN/1.73M2 — SIGNIFICANT CHANGE UP
EOSINOPHIL # BLD AUTO: 0.03 K/UL — SIGNIFICANT CHANGE UP (ref 0–0.5)
EOSINOPHIL NFR BLD AUTO: 0.2 % — SIGNIFICANT CHANGE UP (ref 0–6)
GLUCOSE SERPL-MCNC: 122 MG/DL — HIGH (ref 70–99)
HCT VFR BLD CALC: 27.5 % — LOW (ref 39–50)
HGB BLD-MCNC: 9.2 G/DL — LOW (ref 13–17)
IMM GRANULOCYTES NFR BLD AUTO: 1.4 % — HIGH (ref 0–0.9)
INR BLD: 1.03 — SIGNIFICANT CHANGE UP (ref 0.85–1.16)
LYMPHOCYTES # BLD AUTO: 15.6 % — SIGNIFICANT CHANGE UP (ref 13–44)
LYMPHOCYTES # BLD AUTO: 3 K/UL — SIGNIFICANT CHANGE UP (ref 1–3.3)
MAGNESIUM SERPL-MCNC: 2.4 MG/DL — SIGNIFICANT CHANGE UP (ref 1.6–2.6)
MCHC RBC-ENTMCNC: 31.2 PG — SIGNIFICANT CHANGE UP (ref 27–34)
MCHC RBC-ENTMCNC: 33.5 G/DL — SIGNIFICANT CHANGE UP (ref 32–36)
MCV RBC AUTO: 93.2 FL — SIGNIFICANT CHANGE UP (ref 80–100)
MONOCYTES # BLD AUTO: 1.45 K/UL — HIGH (ref 0–0.9)
MONOCYTES NFR BLD AUTO: 7.5 % — SIGNIFICANT CHANGE UP (ref 2–14)
NEUTROPHILS # BLD AUTO: 14.47 K/UL — HIGH (ref 1.8–7.4)
NEUTROPHILS NFR BLD AUTO: 75 % — SIGNIFICANT CHANGE UP (ref 43–77)
NRBC # BLD: 0 /100 WBCS — SIGNIFICANT CHANGE UP (ref 0–0)
PHOSPHATE SERPL-MCNC: 1.7 MG/DL — LOW (ref 2.5–4.5)
PLATELET # BLD AUTO: 177 K/UL — SIGNIFICANT CHANGE UP (ref 150–400)
POTASSIUM SERPL-MCNC: 4.1 MMOL/L — SIGNIFICANT CHANGE UP (ref 3.5–5.3)
POTASSIUM SERPL-SCNC: 4.1 MMOL/L — SIGNIFICANT CHANGE UP (ref 3.5–5.3)
PROT SERPL-MCNC: 6.3 G/DL — SIGNIFICANT CHANGE UP (ref 6–8.3)
PROTHROM AB SERPL-ACNC: 12.1 SEC — SIGNIFICANT CHANGE UP (ref 9.9–13.4)
RBC # BLD: 2.95 M/UL — LOW (ref 4.2–5.8)
RBC # FLD: 15.3 % — HIGH (ref 10.3–14.5)
RH IG SCN BLD-IMP: POSITIVE — SIGNIFICANT CHANGE UP
SODIUM SERPL-SCNC: 133 MMOL/L — LOW (ref 135–145)
WBC # BLD: 19.28 K/UL — HIGH (ref 3.8–10.5)
WBC # FLD AUTO: 19.28 K/UL — HIGH (ref 3.8–10.5)

## 2024-11-25 PROCEDURE — 99291 CRITICAL CARE FIRST HOUR: CPT

## 2024-11-25 PROCEDURE — 36410 VNPNXR 3YR/> PHY/QHP DX/THER: CPT

## 2024-11-25 PROCEDURE — 36000 PLACE NEEDLE IN VEIN: CPT

## 2024-11-25 PROCEDURE — 76937 US GUIDE VASCULAR ACCESS: CPT | Mod: 26

## 2024-11-25 PROCEDURE — 71045 X-RAY EXAM CHEST 1 VIEW: CPT | Mod: 26

## 2024-11-25 RX ORDER — METOPROLOL TARTRATE 100 MG/1
25 TABLET, FILM COATED ORAL EVERY 6 HOURS
Refills: 0 | Status: DISCONTINUED | OUTPATIENT
Start: 2024-11-25 | End: 2024-11-27

## 2024-11-25 RX ORDER — FUROSEMIDE 40 MG/1
40 TABLET ORAL DAILY
Refills: 0 | Status: DISCONTINUED | OUTPATIENT
Start: 2024-11-25 | End: 2024-11-27

## 2024-11-25 RX ORDER — METOPROLOL TARTRATE 100 MG/1
25 TABLET, FILM COATED ORAL ONCE
Refills: 0 | Status: COMPLETED | OUTPATIENT
Start: 2024-11-25 | End: 2024-11-25

## 2024-11-25 RX ORDER — HEPARIN SODIUM 5000 [USP'U]/.5ML
15 INJECTION, SOLUTION INTRAVENOUS; SUBCUTANEOUS ONCE
Refills: 0 | Status: COMPLETED | OUTPATIENT
Start: 2024-11-25 | End: 2024-11-25

## 2024-11-25 RX ORDER — METOPROLOL TARTRATE 100 MG/1
25 TABLET, FILM COATED ORAL EVERY 8 HOURS
Refills: 0 | Status: DISCONTINUED | OUTPATIENT
Start: 2024-11-25 | End: 2024-11-25

## 2024-11-25 RX ORDER — HEPARIN SODIUM 5000 [USP'U]/.5ML
15 INJECTION, SOLUTION INTRAVENOUS; SUBCUTANEOUS ONCE
Refills: 0 | Status: DISCONTINUED | OUTPATIENT
Start: 2024-11-25 | End: 2024-11-25

## 2024-11-25 RX ADMIN — ACETAMINOPHEN 500MG 1000 MILLIGRAM(S): 500 TABLET, COATED ORAL at 11:14

## 2024-11-25 RX ADMIN — Medication 500 MILLIGRAM(S): at 18:08

## 2024-11-25 RX ADMIN — ACETAMINOPHEN 500MG 1000 MILLIGRAM(S): 500 TABLET, COATED ORAL at 00:31

## 2024-11-25 RX ADMIN — Medication 5000 UNIT(S): at 23:09

## 2024-11-25 RX ADMIN — AMIODARONE HYDROCHLORIDE 400 MILLIGRAM(S): 200 TABLET ORAL at 14:35

## 2024-11-25 RX ADMIN — FUROSEMIDE 40 MILLIGRAM(S): 40 TABLET ORAL at 06:49

## 2024-11-25 RX ADMIN — METOPROLOL TARTRATE 25 MILLIGRAM(S): 100 TABLET, FILM COATED ORAL at 23:09

## 2024-11-25 RX ADMIN — ACETAMINOPHEN 500MG 1000 MILLIGRAM(S): 500 TABLET, COATED ORAL at 06:49

## 2024-11-25 RX ADMIN — POLYETHYLENE GLYCOL 3350 17 GRAM(S): 17 POWDER, FOR SOLUTION ORAL at 11:14

## 2024-11-25 RX ADMIN — ACETAMINOPHEN 500MG 1000 MILLIGRAM(S): 500 TABLET, COATED ORAL at 23:09

## 2024-11-25 RX ADMIN — GABAPENTIN 100 MILLIGRAM(S): 300 CAPSULE ORAL at 00:31

## 2024-11-25 RX ADMIN — Medication 500 MILLIGRAM(S): at 06:49

## 2024-11-25 RX ADMIN — ACETAMINOPHEN 500MG 1000 MILLIGRAM(S): 500 TABLET, COATED ORAL at 07:49

## 2024-11-25 RX ADMIN — Medication 1 APPLICATION(S): at 06:50

## 2024-11-25 RX ADMIN — GABAPENTIN 100 MILLIGRAM(S): 300 CAPSULE ORAL at 16:08

## 2024-11-25 RX ADMIN — Medication 1 APPLICATION(S): at 18:57

## 2024-11-25 RX ADMIN — Medication 81 MILLIGRAM(S): at 11:14

## 2024-11-25 RX ADMIN — Medication 5000 UNIT(S): at 16:08

## 2024-11-25 RX ADMIN — ACETAMINOPHEN 500MG 1000 MILLIGRAM(S): 500 TABLET, COATED ORAL at 18:08

## 2024-11-25 RX ADMIN — AMIODARONE HYDROCHLORIDE 400 MILLIGRAM(S): 200 TABLET ORAL at 06:49

## 2024-11-25 RX ADMIN — FUROSEMIDE 20 MILLIGRAM(S): 40 TABLET ORAL at 00:46

## 2024-11-25 RX ADMIN — HEPARIN SODIUM 62.5 MILLIMOLE(S): 5000 INJECTION, SOLUTION INTRAVENOUS; SUBCUTANEOUS at 05:59

## 2024-11-25 RX ADMIN — METOPROLOL TARTRATE 25 MILLIGRAM(S): 100 TABLET, FILM COATED ORAL at 06:49

## 2024-11-25 RX ADMIN — ACETAMINOPHEN 500MG 1000 MILLIGRAM(S): 500 TABLET, COATED ORAL at 19:08

## 2024-11-25 RX ADMIN — METOPROLOL TARTRATE 25 MILLIGRAM(S): 100 TABLET, FILM COATED ORAL at 14:38

## 2024-11-25 RX ADMIN — METOPROLOL TARTRATE 25 MILLIGRAM(S): 100 TABLET, FILM COATED ORAL at 19:13

## 2024-11-25 RX ADMIN — GABAPENTIN 100 MILLIGRAM(S): 300 CAPSULE ORAL at 06:49

## 2024-11-25 RX ADMIN — CHLORHEXIDINE GLUCONATE 1 APPLICATION(S): 1.2 RINSE ORAL at 11:15

## 2024-11-25 RX ADMIN — ACETAMINOPHEN 500MG 1000 MILLIGRAM(S): 500 TABLET, COATED ORAL at 01:25

## 2024-11-25 RX ADMIN — OXYCODONE HYDROCHLORIDE 5 MILLIGRAM(S): 30 TABLET ORAL at 10:00

## 2024-11-25 RX ADMIN — AMIODARONE HYDROCHLORIDE 400 MILLIGRAM(S): 200 TABLET ORAL at 22:18

## 2024-11-25 RX ADMIN — GABAPENTIN 100 MILLIGRAM(S): 300 CAPSULE ORAL at 23:09

## 2024-11-25 RX ADMIN — ACETAMINOPHEN 500MG 1000 MILLIGRAM(S): 500 TABLET, COATED ORAL at 12:14

## 2024-11-25 RX ADMIN — Medication 5000 UNIT(S): at 00:31

## 2024-11-25 RX ADMIN — OXYCODONE HYDROCHLORIDE 5 MILLIGRAM(S): 30 TABLET ORAL at 08:32

## 2024-11-25 RX ADMIN — PANTOPRAZOLE SODIUM 40 MILLIGRAM(S): 40 TABLET, DELAYED RELEASE ORAL at 06:49

## 2024-11-25 RX ADMIN — Medication 5000 UNIT(S): at 06:50

## 2024-11-25 NOTE — CHART NOTE - NSCHARTNOTEFT_GEN_A_CORE
Admitting Diagnosis:   Patient is a 49y old  Male who presents with a chief complaint of CAD (25 Nov 2024 11:02)      PAST MEDICAL & SURGICAL HISTORY:  No pertinent past medical history      Back pain          Current Nutrition Order: DASH/TLC       PO Intake: Good (%) [   ]  Fair (50-75%) [   ] Poor (<25%) [   ] - 25-50%     GI Issues: noted with constipation - ordered for miralax and senna     Pain: noted with mild pain (3/10 per flowsheets) - on pain regimen      Skin Integrity:  no pressure injuries, noted with +1 generalized edema   surgical incision MSI, R arm, R leg, L forearm   Noah score 20      11-24-24 @ 07:01  -  11-25-24 @ 07:00  --------------------------------------------------------  IN: 692.5 mL / OUT: 1185 mL / NET: -492.5 mL    11-25-24 @ 07:01  -  11-25-24 @ 12:05  --------------------------------------------------------  IN: 125 mL / OUT: 550 mL / NET: -425 mL        Labs:   11-25    133[L]  |  97  |  20  ----------------------------<  122[H]  4.1   |  27  |  1.18    Ca    7.9[L]      25 Nov 2024 04:55  Phos  1.7     11-25  Mg     2.4     11-25    TPro  6.3  /  Alb  3.6  /  TBili  0.4  /  DBili  x   /  AST  50[H]  /  ALT  28  /  AlkPhos  65  11-25    CAPILLARY BLOOD GLUCOSE      POCT Blood Glucose.: 126 mg/dL (25 Nov 2024 11:17)  POCT Blood Glucose.: 130 mg/dL (25 Nov 2024 07:17)  POCT Blood Glucose.: 143 mg/dL (24 Nov 2024 21:17)  POCT Blood Glucose.: 425 mg/dL (24 Nov 2024 21:14)  POCT Blood Glucose.: 425 mg/dL (24 Nov 2024 21:13)      Medications:  MEDICATIONS  (STANDING):  acetaminophen     Tablet .. 1000 milliGRAM(s) Oral every 6 hours  aMIOdarone    Tablet   Oral   aMIOdarone    Tablet 400 milliGRAM(s) Oral every 8 hours  ascorbic acid 500 milliGRAM(s) Oral two times a day  aspirin enteric coated 81 milliGRAM(s) Oral daily  bisacodyl Suppository 10 milliGRAM(s) Rectal once  chlorhexidine 2% Cloths 1 Application(s) Topical daily  dextrose 5%. 1000 milliLiter(s) (50 mL/Hr) IV Continuous <Continuous>  dextrose 5%. 1000 milliLiter(s) (100 mL/Hr) IV Continuous <Continuous>  dextrose 50% Injectable 25 Gram(s) IV Push once  dextrose 50% Injectable 12.5 Gram(s) IV Push once  dextrose 50% Injectable 25 Gram(s) IV Push once  furosemide    Tablet 40 milliGRAM(s) Oral daily  gabapentin 100 milliGRAM(s) Oral every 8 hours  glucagon  Injectable 1 milliGRAM(s) IntraMuscular once  heparin   Injectable 5000 Unit(s) SubCutaneous every 8 hours  influenza   Vaccine 0.5 milliLiter(s) IntraMuscular once  insulin lispro (ADMELOG) corrective regimen sliding scale   SubCutaneous three times a day before meals  metoprolol tartrate 25 milliGRAM(s) Oral every 8 hours  mupirocin 2% Ointment 1 Application(s) Both Nostrils two times a day  pantoprazole    Tablet 40 milliGRAM(s) Oral before breakfast  polyethylene glycol 3350 17 Gram(s) Oral daily  senna 2 Tablet(s) Oral at bedtime  sodium chloride 0.9%. 1000 milliLiter(s) (10 mL/Hr) IV Continuous <Continuous>    MEDICATIONS  (PRN):  dextrose Oral Gel 15 Gram(s) Oral once PRN Blood Glucose LESS THAN 70 milliGRAM(s)/deciliter  oxyCODONE    IR 5 milliGRAM(s) Oral every 4 hours PRN Moderate Pain (4 - 6)      Anthropometrics:  Dosing height: 69in  Dosing weight: 99.8kg/220 pounds  BMI 32.5   pounds, %%    Weight Change:   11/20 102.5kg/225.9 pounds     Estimated energy needs:   IBW for EER as %IBW>100% in ICU setting. Needs adjusted for age, post-op healing.     EEN: 1963-2327kcal based on 27-32kcal/kg  EPN: 87-109g protein based on 1.2-1.5g/kg protein  EFN: 1963-2327mL based on 27-32mL/kg     Subjective:   49M, current smoker (30 pack years), with no PMHx who presented to [facility] on 11/17 with chest pain radiating to back/shoulders x 2 days. On arrival to ED, he was given ASA and Brilinta 180 mg and Plavix 300 mg. Troponin were elevated, patient was placed on heparin gtt and admitted to telemetry unit at [facility]. On 11/18, he had cardiac cath via RRA which demonstrated 3vCAD. Patient was transferred to [facility] under  for CABG. Patient has completed PST, continued on hep gtt for CAD and planned for CABG tomorrow (11/22/24) with Dr. Dyson.     Pt seen for follow up, however sleeping upon assessment. Subjective information obtained from spouse. Pt with decreased appetite s/p OR. Consumed <50% of eggs and oatmeal for breakfast this AM, however spouse confirms pt has good appetite and intake at baseline. Spouse also notes pt with constipation in setting of poor PO intake- ordered for miralax and senna. Labs and medications reviewed. Na 133<L>, Phos 1.7<L>, glucose 122-382 x 24 hours<H>, HgbA1c 5.6% (11/18). Ordered for insulin sliding scale, furosemide, protonix, vitamin C. RD to remain available for additional nutrition interventions as needed.     Previous Nutrition Diagnosis: Inadequate Oral Intake related to clinical course, pending surgery as evidenced by NPO status    Active [   ]  Resolved [ x ]    If resolved, new PES: Increased nutrient needs (energy, protein) related to increased physiological demand for nutrients as evidenced by post-op healing    Goal: Pt to meet at least 75% of nutritional needs consistently     Recommendations:  1. Continue DASH/TLC diet  >>Consider Consistent Carbohydrate diet modifier if glucose remains elevated   2. Encourage and monitor PO intake, honor preferences as able   >> Consistently meet >75% of estimated needs during admission   >> Consider oral nutrition supplement or liberalizing diet should intake decline </= 50%   3. Monitor wt trends, GI function, skin integrity  4. Monitor lytes, renal indices, blood glucose, LFTs    5. Pain and bowel regimen per team     Education: Discussed adequate intake goals with spouse; importance of adequate kcal/protein/fluids for post-op healing.     Risk Level: High [ x ] Moderate [   ] Low [   ]

## 2024-11-25 NOTE — PROGRESS NOTE ADULT - ASSESSMENT
49M current smoker presented to OSH with NSTEMI s/p cardiac cath 11/18 revealing multivCAD. Tx to H for CABG eval and mgmt.  S/p CABGx 4( LIMA to LAD, Radial to OM, GSV to OM, LADI to RCA )   EF 50%  Procedure c/b hematoma in RUE 2/2 R radial arterial line requiring intraop vascular consult and fasciotomy  A/p  Post op Afib RVR -- converted to sinus with amio boluses, now transitioned to PO amio load/ BB started and titrated up   Lytes repleted and in good range  Postop Anemia -- stable H&H / CTs dc'd  labs otherwise stable and in good ramge  bilateral basilar atelectasis improving with improved IS and chest PT-- to continue  Maintaining Neg FB with daily PO lasix for mild anasarca and mobilizing fluid   Right arm fasciotomy/ Wound vac changed by vascular today with plan to close on Wednesday 11/27    Continue ASA/ICU ppx- to start statin   delined and jeanine dc'd/floor eligible     ATTENDING: I have personally and independently provided 50 min of critical care services. This excludes any time spent on separate procedures or teaching.

## 2024-11-25 NOTE — PROGRESS NOTE ADULT - SUBJECTIVE AND OBJECTIVE BOX
events of weekend noted and followed  today patient seen and examined  patient without complaint decrease motor/ sensory function right arm /hand.  my exam reveals motor and sensory 5/5  good capillary refill  continue management as per vascular/ cardiac surgery  will continue to follow

## 2024-11-25 NOTE — PROGRESS NOTE ADULT - SUBJECTIVE AND OBJECTIVE BOX
SUBJECTIVE:  Flatus: [ ] YES [ ] NO             Bowel Movement: [ ] YES [ ] NO  Pain (0-10):            Pain Control Adequate: [ ] YES [ ] NO  Nausea: [ ] YES [ ] NO            Vomiting: [ ] YES [ ] NO  Diarrhea: [ ] YES [ ] NO         Constipation: [ ] YES [ ] NO     Chest Pain: [ ] YES [ ] NO    SOB:  [ ] YES [ ] NO    MEDICATIONS  (STANDING):  acetaminophen     Tablet .. 1000 milliGRAM(s) Oral every 6 hours  aMIOdarone    Tablet   Oral   aMIOdarone    Tablet 400 milliGRAM(s) Oral every 8 hours  ascorbic acid 500 milliGRAM(s) Oral two times a day  aspirin enteric coated 81 milliGRAM(s) Oral daily  bisacodyl Suppository 10 milliGRAM(s) Rectal once  chlorhexidine 2% Cloths 1 Application(s) Topical daily  dextrose 5%. 1000 milliLiter(s) (100 mL/Hr) IV Continuous <Continuous>  dextrose 5%. 1000 milliLiter(s) (50 mL/Hr) IV Continuous <Continuous>  dextrose 50% Injectable 25 Gram(s) IV Push once  dextrose 50% Injectable 12.5 Gram(s) IV Push once  dextrose 50% Injectable 25 Gram(s) IV Push once  furosemide    Tablet 40 milliGRAM(s) Oral daily  gabapentin 100 milliGRAM(s) Oral every 8 hours  glucagon  Injectable 1 milliGRAM(s) IntraMuscular once  heparin   Injectable 5000 Unit(s) SubCutaneous every 8 hours  influenza   Vaccine 0.5 milliLiter(s) IntraMuscular once  insulin lispro (ADMELOG) corrective regimen sliding scale   SubCutaneous three times a day before meals  metoprolol tartrate 25 milliGRAM(s) Oral every 8 hours  mupirocin 2% Ointment 1 Application(s) Both Nostrils two times a day  pantoprazole    Tablet 40 milliGRAM(s) Oral before breakfast  polyethylene glycol 3350 17 Gram(s) Oral daily  senna 2 Tablet(s) Oral at bedtime  sodium chloride 0.9%. 1000 milliLiter(s) (10 mL/Hr) IV Continuous <Continuous>    MEDICATIONS  (PRN):  dextrose Oral Gel 15 Gram(s) Oral once PRN Blood Glucose LESS THAN 70 milliGRAM(s)/deciliter  oxyCODONE    IR 5 milliGRAM(s) Oral every 4 hours PRN Moderate Pain (4 - 6)      Vital Signs Last 24 Hrs  T(C): 36.1 (25 Nov 2024 09:49), Max: 37.9 (24 Nov 2024 17:33)  T(F): 97 (25 Nov 2024 09:49), Max: 100.2 (24 Nov 2024 17:33)  HR: 83 (25 Nov 2024 09:01) (78 - 145)  BP: 115/65 (25 Nov 2024 09:00) (86/66 - 144/76)  BP(mean): 82 (25 Nov 2024 09:00) (72 - 101)  RR: 18 (25 Nov 2024 09:01) (16 - 20)  SpO2: 93% (25 Nov 2024 09:01) (87% - 98%)    Parameters below as of 25 Nov 2024 09:01  Patient On (Oxygen Delivery Method): nasal cannula w/ humidification  O2 Flow (L/min): 6      Physical Exam:  General: NAD, resting comfortably in bed  Pulmonary: Nonlabored breathing, no respiratory distress  Cardiovascular: NSR  Abdominal: soft, NT/ND  Extremities: WWP, normal strength  Neuro: A/O x 3, CNs II-XII grossly intact, no focal deficits, normal motor/sensation  Pulses: palpable distal pulses    I&O's Summary    24 Nov 2024 07:01  -  25 Nov 2024 07:00  --------------------------------------------------------  IN: 692.5 mL / OUT: 1185 mL / NET: -492.5 mL        LABS:                        9.2    19.28 )-----------( 177      ( 25 Nov 2024 04:55 )             27.5     11-25    133[L]  |  97  |  20  ----------------------------<  122[H]  4.1   |  27  |  1.18    Ca    7.9[L]      25 Nov 2024 04:55  Phos  1.7     11-25  Mg     2.4     11-25    TPro  6.3  /  Alb  3.6  /  TBili  0.4  /  DBili  x   /  AST  50[H]  /  ALT  28  /  AlkPhos  65  11-25    PT/INR - ( 25 Nov 2024 04:55 )   PT: 12.1 sec;   INR: 1.03          PTT - ( 25 Nov 2024 04:55 )  PTT:30.0 sec  Urinalysis Basic - ( 25 Nov 2024 04:55 )    Color: x / Appearance: x / SG: x / pH: x  Gluc: 122 mg/dL / Ketone: x  / Bili: x / Urobili: x   Blood: x / Protein: x / Nitrite: x   Leuk Esterase: x / RBC: x / WBC x   Sq Epi: x / Non Sq Epi: x / Bacteria: x      CAPILLARY BLOOD GLUCOSE      POCT Blood Glucose.: 130 mg/dL (25 Nov 2024 07:17)  POCT Blood Glucose.: 143 mg/dL (24 Nov 2024 21:17)  POCT Blood Glucose.: 425 mg/dL (24 Nov 2024 21:14)  POCT Blood Glucose.: 425 mg/dL (24 Nov 2024 21:13)  POCT Blood Glucose.: 178 mg/dL (24 Nov 2024 10:51)    LIVER FUNCTIONS - ( 25 Nov 2024 04:55 )  Alb: 3.6 g/dL / Pro: 6.3 g/dL / ALK PHOS: 65 U/L / ALT: 28 U/L / AST: 50 U/L / GGT: x             RADIOLOGY & ADDITIONAL STUDIES:       SUBJECTIVE: Patient was evaluated at bedside this AM by vascular surgery team. Patient has no complaints at this time. Patient was informed about likelihood for RTOR for R incision closure. Patient had bedside wound vac changed this AM by vascular surgery team without difficulty.    MEDICATIONS  (STANDING):  acetaminophen     Tablet .. 1000 milliGRAM(s) Oral every 6 hours  aMIOdarone    Tablet   Oral   aMIOdarone    Tablet 400 milliGRAM(s) Oral every 8 hours  ascorbic acid 500 milliGRAM(s) Oral two times a day  aspirin enteric coated 81 milliGRAM(s) Oral daily  bisacodyl Suppository 10 milliGRAM(s) Rectal once  chlorhexidine 2% Cloths 1 Application(s) Topical daily  dextrose 5%. 1000 milliLiter(s) (100 mL/Hr) IV Continuous <Continuous>  dextrose 5%. 1000 milliLiter(s) (50 mL/Hr) IV Continuous <Continuous>  dextrose 50% Injectable 25 Gram(s) IV Push once  dextrose 50% Injectable 12.5 Gram(s) IV Push once  dextrose 50% Injectable 25 Gram(s) IV Push once  furosemide    Tablet 40 milliGRAM(s) Oral daily  gabapentin 100 milliGRAM(s) Oral every 8 hours  glucagon  Injectable 1 milliGRAM(s) IntraMuscular once  heparin   Injectable 5000 Unit(s) SubCutaneous every 8 hours  influenza   Vaccine 0.5 milliLiter(s) IntraMuscular once  insulin lispro (ADMELOG) corrective regimen sliding scale   SubCutaneous three times a day before meals  metoprolol tartrate 25 milliGRAM(s) Oral every 8 hours  mupirocin 2% Ointment 1 Application(s) Both Nostrils two times a day  pantoprazole    Tablet 40 milliGRAM(s) Oral before breakfast  polyethylene glycol 3350 17 Gram(s) Oral daily  senna 2 Tablet(s) Oral at bedtime  sodium chloride 0.9%. 1000 milliLiter(s) (10 mL/Hr) IV Continuous <Continuous>    MEDICATIONS  (PRN):  dextrose Oral Gel 15 Gram(s) Oral once PRN Blood Glucose LESS THAN 70 milliGRAM(s)/deciliter  oxyCODONE    IR 5 milliGRAM(s) Oral every 4 hours PRN Moderate Pain (4 - 6)      Vital Signs Last 24 Hrs  T(C): 36.1 (25 Nov 2024 09:49), Max: 37.9 (24 Nov 2024 17:33)  T(F): 97 (25 Nov 2024 09:49), Max: 100.2 (24 Nov 2024 17:33)  HR: 83 (25 Nov 2024 09:01) (78 - 145)  BP: 115/65 (25 Nov 2024 09:00) (86/66 - 144/76)  BP(mean): 82 (25 Nov 2024 09:00) (72 - 101)  RR: 18 (25 Nov 2024 09:01) (16 - 20)  SpO2: 93% (25 Nov 2024 09:01) (87% - 98%)    Parameters below as of 25 Nov 2024 09:01  Patient On (Oxygen Delivery Method): nasal cannula w/ humidification  O2 Flow (L/min): 6      Physical Exam:  General: alert and awake, NAD  Pulmonary: no respiratory distress  Cardiovascular: RRR  Extremities: RUE VAC in place with good seal, minimal drainage, forearm soft, well perfused, mild edema, not tense.   Pulses: Triphasicpalmar arch doppler signals, palpable radial and ulnar. Hand warm and well perfused, good cap refill<2sec. Motor/sensory intact without deficits.       I&O's Summary    24 Nov 2024 07:01  -  25 Nov 2024 07:00  --------------------------------------------------------  IN: 692.5 mL / OUT: 1185 mL / NET: -492.5 mL        LABS:                        9.2    19.28 )-----------( 177      ( 25 Nov 2024 04:55 )             27.5     11-25    133[L]  |  97  |  20  ----------------------------<  122[H]  4.1   |  27  |  1.18    Ca    7.9[L]      25 Nov 2024 04:55  Phos  1.7     11-25  Mg     2.4     11-25    TPro  6.3  /  Alb  3.6  /  TBili  0.4  /  DBili  x   /  AST  50[H]  /  ALT  28  /  AlkPhos  65  11-25    PT/INR - ( 25 Nov 2024 04:55 )   PT: 12.1 sec;   INR: 1.03          PTT - ( 25 Nov 2024 04:55 )  PTT:30.0 sec  Urinalysis Basic - ( 25 Nov 2024 04:55 )    Color: x / Appearance: x / SG: x / pH: x  Gluc: 122 mg/dL / Ketone: x  / Bili: x / Urobili: x   Blood: x / Protein: x / Nitrite: x   Leuk Esterase: x / RBC: x / WBC x   Sq Epi: x / Non Sq Epi: x / Bacteria: x      CAPILLARY BLOOD GLUCOSE      POCT Blood Glucose.: 130 mg/dL (25 Nov 2024 07:17)  POCT Blood Glucose.: 143 mg/dL (24 Nov 2024 21:17)  POCT Blood Glucose.: 425 mg/dL (24 Nov 2024 21:14)  POCT Blood Glucose.: 425 mg/dL (24 Nov 2024 21:13)  POCT Blood Glucose.: 178 mg/dL (24 Nov 2024 10:51)    LIVER FUNCTIONS - ( 25 Nov 2024 04:55 )  Alb: 3.6 g/dL / Pro: 6.3 g/dL / ALK PHOS: 65 U/L / ALT: 28 U/L / AST: 50 U/L / GGT: x             RADIOLOGY & ADDITIONAL STUDIES:

## 2024-11-25 NOTE — PROGRESS NOTE ADULT - SUBJECTIVE AND OBJECTIVE BOX
INTERVAL COURSE  POD#3  CABGx4    Intraop course c/b right arm hematoma requiring fasciotomy   Afib converted to sinus with amio boluses and BB      VITALS  Vital Signs Last 24 Hrs  T(C): 36.8 (25 Nov 2024 17:56), Max: 36.8 (25 Nov 2024 17:56)  T(F): 98.2 (25 Nov 2024 17:56), Max: 98.2 (25 Nov 2024 17:56)  HR: 77 (25 Nov 2024 22:00) (77 - 134)  BP: 110/71 (25 Nov 2024 22:00) (100/66 - 144/76)  BP(mean): 86 (25 Nov 2024 22:00) (76 - 101)  RR: 18 (25 Nov 2024 22:00) (16 - 116)  SpO2: 100% (25 Nov 2024 22:00) (90% - 100%)    Parameters below as of 25 Nov 2024 22:00  Patient On (Oxygen Delivery Method): nasal cannula w/ humidification  O2 Flow (L/min): 2      I&O's Summary  25 Nov 2024 07:01  -  25 Nov 2024 22:41  --------------------------------------------------------  IN: 125 mL / OUT: 1200 mL / NET: -1075 mL      PHYSICAL EXAM  General: A&Ox 3; NAD  Respiratory: CTA B/L; no wheezes/crackles/rales auscultated w/ good air movement  Cardiovascular: Regular rhythm/rate; S1/S2; no gallops or murmurs auscultated  Gastrointestinal: Soft; NTND w/out rebound tenderness or guarding; bowel sounds normal  Extremities: WWP; no edema or cyanosis; radial/pedal pulses palpable  Neurological:  CNII-XII grossly intact; no obvious focal deficits      IMAGING/EKG/ETC  Improving atelectasis and pulmonary congestion

## 2024-11-26 LAB
ALBUMIN SERPL ELPH-MCNC: 3.4 G/DL — SIGNIFICANT CHANGE UP (ref 3.3–5)
ALP SERPL-CCNC: 65 U/L — SIGNIFICANT CHANGE UP (ref 40–120)
ALT FLD-CCNC: 30 U/L — SIGNIFICANT CHANGE UP (ref 10–45)
ANION GAP SERPL CALC-SCNC: 10 MMOL/L — SIGNIFICANT CHANGE UP (ref 5–17)
APTT BLD: 26.3 SEC — SIGNIFICANT CHANGE UP (ref 24.5–35.6)
AST SERPL-CCNC: 46 U/L — HIGH (ref 10–40)
BASOPHILS # BLD AUTO: 0.05 K/UL — SIGNIFICANT CHANGE UP (ref 0–0.2)
BASOPHILS NFR BLD AUTO: 0.3 % — SIGNIFICANT CHANGE UP (ref 0–2)
BILIRUB SERPL-MCNC: 0.4 MG/DL — SIGNIFICANT CHANGE UP (ref 0.2–1.2)
BUN SERPL-MCNC: 20 MG/DL — SIGNIFICANT CHANGE UP (ref 7–23)
CALCIUM SERPL-MCNC: 7.7 MG/DL — LOW (ref 8.4–10.5)
CHLORIDE SERPL-SCNC: 99 MMOL/L — SIGNIFICANT CHANGE UP (ref 96–108)
CO2 SERPL-SCNC: 25 MMOL/L — SIGNIFICANT CHANGE UP (ref 22–31)
CREAT SERPL-MCNC: 1.02 MG/DL — SIGNIFICANT CHANGE UP (ref 0.5–1.3)
EGFR: 90 ML/MIN/1.73M2 — SIGNIFICANT CHANGE UP
EOSINOPHIL # BLD AUTO: 0.21 K/UL — SIGNIFICANT CHANGE UP (ref 0–0.5)
EOSINOPHIL NFR BLD AUTO: 1.4 % — SIGNIFICANT CHANGE UP (ref 0–6)
GLUCOSE SERPL-MCNC: 100 MG/DL — HIGH (ref 70–99)
HCT VFR BLD CALC: 24.7 % — LOW (ref 39–50)
HGB BLD-MCNC: 8 G/DL — LOW (ref 13–17)
IMM GRANULOCYTES NFR BLD AUTO: 1.3 % — HIGH (ref 0–0.9)
INR BLD: 1.01 — SIGNIFICANT CHANGE UP (ref 0.85–1.16)
LYMPHOCYTES # BLD AUTO: 20.5 % — SIGNIFICANT CHANGE UP (ref 13–44)
LYMPHOCYTES # BLD AUTO: 3.04 K/UL — SIGNIFICANT CHANGE UP (ref 1–3.3)
MAGNESIUM SERPL-MCNC: 2.4 MG/DL — SIGNIFICANT CHANGE UP (ref 1.6–2.6)
MCHC RBC-ENTMCNC: 29.6 PG — SIGNIFICANT CHANGE UP (ref 27–34)
MCHC RBC-ENTMCNC: 32.4 G/DL — SIGNIFICANT CHANGE UP (ref 32–36)
MCV RBC AUTO: 91.5 FL — SIGNIFICANT CHANGE UP (ref 80–100)
MONOCYTES # BLD AUTO: 1.22 K/UL — HIGH (ref 0–0.9)
MONOCYTES NFR BLD AUTO: 8.2 % — SIGNIFICANT CHANGE UP (ref 2–14)
NEUTROPHILS # BLD AUTO: 10.09 K/UL — HIGH (ref 1.8–7.4)
NEUTROPHILS NFR BLD AUTO: 68.3 % — SIGNIFICANT CHANGE UP (ref 43–77)
NRBC # BLD: 0 /100 WBCS — SIGNIFICANT CHANGE UP (ref 0–0)
PHOSPHATE SERPL-MCNC: 2 MG/DL — LOW (ref 2.5–4.5)
PLATELET # BLD AUTO: 233 K/UL — SIGNIFICANT CHANGE UP (ref 150–400)
POTASSIUM SERPL-MCNC: 4 MMOL/L — SIGNIFICANT CHANGE UP (ref 3.5–5.3)
POTASSIUM SERPL-SCNC: 4 MMOL/L — SIGNIFICANT CHANGE UP (ref 3.5–5.3)
PROT SERPL-MCNC: 6.1 G/DL — SIGNIFICANT CHANGE UP (ref 6–8.3)
PROTHROM AB SERPL-ACNC: 11.6 SEC — SIGNIFICANT CHANGE UP (ref 9.9–13.4)
RBC # BLD: 2.7 M/UL — LOW (ref 4.2–5.8)
RBC # FLD: 15.6 % — HIGH (ref 10.3–14.5)
SODIUM SERPL-SCNC: 134 MMOL/L — LOW (ref 135–145)
WBC # BLD: 14.8 K/UL — HIGH (ref 3.8–10.5)
WBC # FLD AUTO: 14.8 K/UL — HIGH (ref 3.8–10.5)

## 2024-11-26 PROCEDURE — 71045 X-RAY EXAM CHEST 1 VIEW: CPT | Mod: 26

## 2024-11-26 PROCEDURE — 11043 DBRDMT MUSC&/FSCA 1ST 20/<: CPT | Mod: GC,58

## 2024-11-26 PROCEDURE — 99232 SBSQ HOSP IP/OBS MODERATE 35: CPT

## 2024-11-26 PROCEDURE — 12032 INTMD RPR S/A/T/EXT 2.6-7.5: CPT | Mod: GC,58

## 2024-11-26 RX ORDER — AMLODIPINE BESYLATE 10 MG/1
2.5 TABLET ORAL DAILY
Refills: 0 | Status: DISCONTINUED | OUTPATIENT
Start: 2024-11-26 | End: 2024-11-27

## 2024-11-26 RX ADMIN — GABAPENTIN 100 MILLIGRAM(S): 300 CAPSULE ORAL at 16:10

## 2024-11-26 RX ADMIN — ACETAMINOPHEN 500MG 1000 MILLIGRAM(S): 500 TABLET, COATED ORAL at 23:11

## 2024-11-26 RX ADMIN — ACETAMINOPHEN 500MG 1000 MILLIGRAM(S): 500 TABLET, COATED ORAL at 06:18

## 2024-11-26 RX ADMIN — OXYCODONE HYDROCHLORIDE 5 MILLIGRAM(S): 30 TABLET ORAL at 07:09

## 2024-11-26 RX ADMIN — Medication 5 MILLIGRAM(S): at 10:45

## 2024-11-26 RX ADMIN — Medication 1 APPLICATION(S): at 05:19

## 2024-11-26 RX ADMIN — AMLODIPINE BESYLATE 2.5 MILLIGRAM(S): 10 TABLET ORAL at 18:32

## 2024-11-26 RX ADMIN — Medication 500 MILLIGRAM(S): at 17:03

## 2024-11-26 RX ADMIN — AMIODARONE HYDROCHLORIDE 400 MILLIGRAM(S): 200 TABLET ORAL at 21:40

## 2024-11-26 RX ADMIN — METOPROLOL TARTRATE 25 MILLIGRAM(S): 100 TABLET, FILM COATED ORAL at 05:17

## 2024-11-26 RX ADMIN — Medication 1 APPLICATION(S): at 17:11

## 2024-11-26 RX ADMIN — Medication 500 MILLIGRAM(S): at 05:17

## 2024-11-26 RX ADMIN — AMIODARONE HYDROCHLORIDE 400 MILLIGRAM(S): 200 TABLET ORAL at 14:17

## 2024-11-26 RX ADMIN — Medication 81 MILLIGRAM(S): at 11:07

## 2024-11-26 RX ADMIN — Medication 5000 UNIT(S): at 16:10

## 2024-11-26 RX ADMIN — FUROSEMIDE 40 MILLIGRAM(S): 40 TABLET ORAL at 05:18

## 2024-11-26 RX ADMIN — GABAPENTIN 100 MILLIGRAM(S): 300 CAPSULE ORAL at 07:11

## 2024-11-26 RX ADMIN — ACETAMINOPHEN 500MG 1000 MILLIGRAM(S): 500 TABLET, COATED ORAL at 11:07

## 2024-11-26 RX ADMIN — METOPROLOL TARTRATE 25 MILLIGRAM(S): 100 TABLET, FILM COATED ORAL at 17:04

## 2024-11-26 RX ADMIN — ACETAMINOPHEN 500MG 1000 MILLIGRAM(S): 500 TABLET, COATED ORAL at 12:07

## 2024-11-26 RX ADMIN — METOPROLOL TARTRATE 25 MILLIGRAM(S): 100 TABLET, FILM COATED ORAL at 23:11

## 2024-11-26 RX ADMIN — ACETAMINOPHEN 500MG 1000 MILLIGRAM(S): 500 TABLET, COATED ORAL at 18:02

## 2024-11-26 RX ADMIN — Medication 5000 UNIT(S): at 23:10

## 2024-11-26 RX ADMIN — ACETAMINOPHEN 500MG 1000 MILLIGRAM(S): 500 TABLET, COATED ORAL at 00:00

## 2024-11-26 RX ADMIN — GABAPENTIN 100 MILLIGRAM(S): 300 CAPSULE ORAL at 23:11

## 2024-11-26 RX ADMIN — PANTOPRAZOLE SODIUM 40 MILLIGRAM(S): 40 TABLET, DELAYED RELEASE ORAL at 05:18

## 2024-11-26 RX ADMIN — AMIODARONE HYDROCHLORIDE 400 MILLIGRAM(S): 200 TABLET ORAL at 05:17

## 2024-11-26 RX ADMIN — ACETAMINOPHEN 500MG 1000 MILLIGRAM(S): 500 TABLET, COATED ORAL at 05:18

## 2024-11-26 RX ADMIN — Medication 40 MILLIGRAM(S): at 21:41

## 2024-11-26 RX ADMIN — Medication 5000 UNIT(S): at 07:12

## 2024-11-26 RX ADMIN — ACETAMINOPHEN 500MG 1000 MILLIGRAM(S): 500 TABLET, COATED ORAL at 17:04

## 2024-11-26 RX ADMIN — OXYCODONE HYDROCHLORIDE 5 MILLIGRAM(S): 30 TABLET ORAL at 06:09

## 2024-11-26 RX ADMIN — POLYETHYLENE GLYCOL 3350 17 GRAM(S): 17 POWDER, FOR SOLUTION ORAL at 11:07

## 2024-11-26 NOTE — PROGRESS NOTE ADULT - ASSESSMENT
Pt is a 49yr old male with no reported PMH initially presented to [facility] 11/17 for CP. Found to have troponemia and was admitted for ACS. Cath on 11/18 revealed 3V CAD for which pt was tx to [facility] 11/18 for surgical evaluation. Pt is undergoing preoperative planning. Pt underwent CABG x 4 (LIMA-LAD, Radial-OM, SVG-OM, LADI-RCA, EF 45-50%; CBP 198min, XC 108min) with Dr. Dyson 11/22/24. Procedure c/b hematoma in RUE 2/2 R radial arterial line requiring intraop vascular consult and fasciotomy.     Recommendations:  -Continue wound VAC  -Will plan to close fasciotomy site on Wednesday 11/27  -Please make patient [email or web address], obtain CBC/BMP/Coags/T&S for OR tomorrow   -Vascular surgery will continue to follow    Plan discussed with chief resident and attending

## 2024-11-26 NOTE — PROGRESS NOTE ADULT - SUBJECTIVE AND OBJECTIVE BOX
SUBJECTIVE:  Flatus: [ ] YES [ ] NO             Bowel Movement: [ ] YES [ ] NO  Pain (0-10):            Pain Control Adequate: [ ] YES [ ] NO  Nausea: [ ] YES [ ] NO            Vomiting: [ ] YES [ ] NO  Diarrhea: [ ] YES [ ] NO         Constipation: [ ] YES [ ] NO     Chest Pain: [ ] YES [ ] NO    SOB:  [ ] YES [ ] NO    MEDICATIONS  (STANDING):  acetaminophen     Tablet .. 1000 milliGRAM(s) Oral every 6 hours  aMIOdarone    Tablet   Oral   aMIOdarone    Tablet 400 milliGRAM(s) Oral every 8 hours  ascorbic acid 500 milliGRAM(s) Oral two times a day  aspirin enteric coated 81 milliGRAM(s) Oral daily  atorvastatin 40 milliGRAM(s) Oral at bedtime  bisacodyl Suppository 10 milliGRAM(s) Rectal once  chlorhexidine 2% Cloths 1 Application(s) Topical daily  dextrose 5%. 1000 milliLiter(s) (100 mL/Hr) IV Continuous <Continuous>  dextrose 5%. 1000 milliLiter(s) (50 mL/Hr) IV Continuous <Continuous>  dextrose 50% Injectable 25 Gram(s) IV Push once  dextrose 50% Injectable 12.5 Gram(s) IV Push once  dextrose 50% Injectable 25 Gram(s) IV Push once  furosemide    Tablet 40 milliGRAM(s) Oral daily  gabapentin 100 milliGRAM(s) Oral every 8 hours  glucagon  Injectable 1 milliGRAM(s) IntraMuscular once  heparin   Injectable 5000 Unit(s) SubCutaneous every 8 hours  influenza   Vaccine 0.5 milliLiter(s) IntraMuscular once  insulin lispro (ADMELOG) corrective regimen sliding scale   SubCutaneous Before meals and at bedtime  metoprolol tartrate 25 milliGRAM(s) Oral every 6 hours  mupirocin 2% Ointment 1 Application(s) Both Nostrils two times a day  pantoprazole    Tablet 40 milliGRAM(s) Oral before breakfast  polyethylene glycol 3350 17 Gram(s) Oral daily  senna 2 Tablet(s) Oral at bedtime  sodium chloride 0.9%. 1000 milliLiter(s) (10 mL/Hr) IV Continuous <Continuous>    MEDICATIONS  (PRN):  dextrose Oral Gel 15 Gram(s) Oral once PRN Blood Glucose LESS THAN 70 milliGRAM(s)/deciliter  oxyCODONE    IR 5 milliGRAM(s) Oral every 4 hours PRN Moderate Pain (4 - 6)      Vital Signs Last 24 Hrs  T(C): 36.7 (26 Nov 2024 09:03), Max: 36.8 (25 Nov 2024 17:56)  T(F): 98 (26 Nov 2024 09:03), Max: 98.2 (25 Nov 2024 17:56)  HR: 87 (26 Nov 2024 11:00) (77 - 93)  BP: 106/59 (26 Nov 2024 11:00) (100/66 - 129/66)  BP(mean): 76 (26 Nov 2024 11:00) (76 - 91)  RR: 18 (26 Nov 2024 11:00) (16 - 116)  SpO2: 92% (26 Nov 2024 11:00) (90% - 100%)    Parameters below as of 26 Nov 2024 12:00  Patient On (Oxygen Delivery Method): room air        Physical Exam:  General: NAD, resting comfortably in bed  Pulmonary: Nonlabored breathing, no respiratory distress  Cardiovascular: NSR  Abdominal: soft, NT/ND  Extremities: WWP, normal strength  Neuro: A/O x 3, CNs II-XII grossly intact, no focal deficits, normal motor/sensation  Pulses: palpable distal pulses    I&O's Summary    25 Nov 2024 07:01  -  26 Nov 2024 07:00  --------------------------------------------------------  IN: 125 mL / OUT: 2200 mL / NET: -2075 mL    26 Nov 2024 07:01  -  26 Nov 2024 11:36  --------------------------------------------------------  IN: 0 mL / OUT: 500 mL / NET: -500 mL        LABS:                        8.0    14.80 )-----------( 233      ( 26 Nov 2024 04:04 )             24.7     11-26    134[L]  |  99  |  20  ----------------------------<  100[H]  4.0   |  25  |  1.02    Ca    7.7[L]      26 Nov 2024 04:04  Phos  2.0     11-26  Mg     2.4     11-26    TPro  6.1  /  Alb  3.4  /  TBili  0.4  /  DBili  x   /  AST  46[H]  /  ALT  30  /  AlkPhos  65  11-26    PT/INR - ( 26 Nov 2024 04:04 )   PT: 11.6 sec;   INR: 1.01          PTT - ( 26 Nov 2024 04:04 )  PTT:26.3 sec  Urinalysis Basic - ( 26 Nov 2024 04:04 )    Color: x / Appearance: x / SG: x / pH: x  Gluc: 100 mg/dL / Ketone: x  / Bili: x / Urobili: x   Blood: x / Protein: x / Nitrite: x   Leuk Esterase: x / RBC: x / WBC x   Sq Epi: x / Non Sq Epi: x / Bacteria: x      CAPILLARY BLOOD GLUCOSE      POCT Blood Glucose.: 122 mg/dL (26 Nov 2024 07:05)  POCT Blood Glucose.: 102 mg/dL (25 Nov 2024 21:53)  POCT Blood Glucose.: 123 mg/dL (25 Nov 2024 16:12)    LIVER FUNCTIONS - ( 26 Nov 2024 04:04 )  Alb: 3.4 g/dL / Pro: 6.1 g/dL / ALK PHOS: 65 U/L / ALT: 30 U/L / AST: 46 U/L / GGT: x             RADIOLOGY & ADDITIONAL STUDIES:       SUBJECTIVE: Patient was evaluated at bedside this AM by vascular surgery team. Patient reports improved pain in RUE and believes the arm to look better than prior. Patient currently has no complaints at this time and is inquiring about OR timing.    MEDICATIONS  (STANDING):  acetaminophen     Tablet .. 1000 milliGRAM(s) Oral every 6 hours  aMIOdarone    Tablet   Oral   aMIOdarone    Tablet 400 milliGRAM(s) Oral every 8 hours  ascorbic acid 500 milliGRAM(s) Oral two times a day  aspirin enteric coated 81 milliGRAM(s) Oral daily  atorvastatin 40 milliGRAM(s) Oral at bedtime  bisacodyl Suppository 10 milliGRAM(s) Rectal once  chlorhexidine 2% Cloths 1 Application(s) Topical daily  dextrose 5%. 1000 milliLiter(s) (100 mL/Hr) IV Continuous <Continuous>  dextrose 5%. 1000 milliLiter(s) (50 mL/Hr) IV Continuous <Continuous>  dextrose 50% Injectable 25 Gram(s) IV Push once  dextrose 50% Injectable 12.5 Gram(s) IV Push once  dextrose 50% Injectable 25 Gram(s) IV Push once  furosemide    Tablet 40 milliGRAM(s) Oral daily  gabapentin 100 milliGRAM(s) Oral every 8 hours  glucagon  Injectable 1 milliGRAM(s) IntraMuscular once  heparin   Injectable 5000 Unit(s) SubCutaneous every 8 hours  influenza   Vaccine 0.5 milliLiter(s) IntraMuscular once  insulin lispro (ADMELOG) corrective regimen sliding scale   SubCutaneous Before meals and at bedtime  metoprolol tartrate 25 milliGRAM(s) Oral every 6 hours  mupirocin 2% Ointment 1 Application(s) Both Nostrils two times a day  pantoprazole    Tablet 40 milliGRAM(s) Oral before breakfast  polyethylene glycol 3350 17 Gram(s) Oral daily  senna 2 Tablet(s) Oral at bedtime  sodium chloride 0.9%. 1000 milliLiter(s) (10 mL/Hr) IV Continuous <Continuous>    MEDICATIONS  (PRN):  dextrose Oral Gel 15 Gram(s) Oral once PRN Blood Glucose LESS THAN 70 milliGRAM(s)/deciliter  oxyCODONE    IR 5 milliGRAM(s) Oral every 4 hours PRN Moderate Pain (4 - 6)      Vital Signs Last 24 Hrs  T(C): 36.7 (26 Nov 2024 09:03), Max: 36.8 (25 Nov 2024 17:56)  T(F): 98 (26 Nov 2024 09:03), Max: 98.2 (25 Nov 2024 17:56)  HR: 87 (26 Nov 2024 11:00) (77 - 93)  BP: 106/59 (26 Nov 2024 11:00) (100/66 - 129/66)  BP(mean): 76 (26 Nov 2024 11:00) (76 - 91)  RR: 18 (26 Nov 2024 11:00) (16 - 116)  SpO2: 92% (26 Nov 2024 11:00) (90% - 100%)    Parameters below as of 26 Nov 2024 12:00  Patient On (Oxygen Delivery Method): room air        Physical Exam:  General: alert and awake, NAD  Pulmonary: no respiratory distress  Cardiovascular: RRR  Extremities: RUE VAC in place with good seal, minimal drainage, forearm soft, well perfused, mild edema, not tense.   Pulses: palpable distal pulses    I&O's Summary    25 Nov 2024 07:01  -  26 Nov 2024 07:00  --------------------------------------------------------  IN: 125 mL / OUT: 2200 mL / NET: -2075 mL    26 Nov 2024 07:01  -  26 Nov 2024 11:36  --------------------------------------------------------  IN: 0 mL / OUT: 500 mL / NET: -500 mL        LABS:                        8.0    14.80 )-----------( 233      ( 26 Nov 2024 04:04 )             24.7     11-26    134[L]  |  99  |  20  ----------------------------<  100[H]  4.0   |  25  |  1.02    Ca    7.7[L]      26 Nov 2024 04:04  Phos  2.0     11-26  Mg     2.4     11-26    TPro  6.1  /  Alb  3.4  /  TBili  0.4  /  DBili  x   /  AST  46[H]  /  ALT  30  /  AlkPhos  65  11-26    PT/INR - ( 26 Nov 2024 04:04 )   PT: 11.6 sec;   INR: 1.01          PTT - ( 26 Nov 2024 04:04 )  PTT:26.3 sec  Urinalysis Basic - ( 26 Nov 2024 04:04 )    Color: x / Appearance: x / SG: x / pH: x  Gluc: 100 mg/dL / Ketone: x  / Bili: x / Urobili: x   Blood: x / Protein: x / Nitrite: x   Leuk Esterase: x / RBC: x / WBC x   Sq Epi: x / Non Sq Epi: x / Bacteria: x      CAPILLARY BLOOD GLUCOSE      POCT Blood Glucose.: 122 mg/dL (26 Nov 2024 07:05)  POCT Blood Glucose.: 102 mg/dL (25 Nov 2024 21:53)  POCT Blood Glucose.: 123 mg/dL (25 Nov 2024 16:12)    LIVER FUNCTIONS - ( 26 Nov 2024 04:04 )  Alb: 3.4 g/dL / Pro: 6.1 g/dL / ALK PHOS: 65 U/L / ALT: 30 U/L / AST: 46 U/L / GGT: x             RADIOLOGY & ADDITIONAL STUDIES:

## 2024-11-27 ENCOUNTER — TRANSCRIPTION ENCOUNTER (OUTPATIENT)
Age: 49
End: 2024-11-27

## 2024-11-27 ENCOUNTER — NON-APPOINTMENT (OUTPATIENT)
Age: 49
End: 2024-11-27

## 2024-11-27 VITALS — SYSTOLIC BLOOD PRESSURE: 120 MMHG | RESPIRATION RATE: 18 BRPM | DIASTOLIC BLOOD PRESSURE: 70 MMHG | HEART RATE: 82 BPM

## 2024-11-27 LAB
ALBUMIN SERPL ELPH-MCNC: 3.4 G/DL — SIGNIFICANT CHANGE UP (ref 3.3–5)
ALP SERPL-CCNC: 69 U/L — SIGNIFICANT CHANGE UP (ref 40–120)
ALT FLD-CCNC: 46 U/L — HIGH (ref 10–45)
ANION GAP SERPL CALC-SCNC: 11 MMOL/L — SIGNIFICANT CHANGE UP (ref 5–17)
APTT BLD: 29.1 SEC — SIGNIFICANT CHANGE UP (ref 24.5–35.6)
AST SERPL-CCNC: 47 U/L — HIGH (ref 10–40)
BASOPHILS # BLD AUTO: 0.04 K/UL — SIGNIFICANT CHANGE UP (ref 0–0.2)
BASOPHILS NFR BLD AUTO: 0.3 % — SIGNIFICANT CHANGE UP (ref 0–2)
BILIRUB SERPL-MCNC: 0.4 MG/DL — SIGNIFICANT CHANGE UP (ref 0.2–1.2)
BUN SERPL-MCNC: 20 MG/DL — SIGNIFICANT CHANGE UP (ref 7–23)
CALCIUM SERPL-MCNC: 8 MG/DL — LOW (ref 8.4–10.5)
CHLORIDE SERPL-SCNC: 101 MMOL/L — SIGNIFICANT CHANGE UP (ref 96–108)
CO2 SERPL-SCNC: 24 MMOL/L — SIGNIFICANT CHANGE UP (ref 22–31)
CREAT SERPL-MCNC: 1.1 MG/DL — SIGNIFICANT CHANGE UP (ref 0.5–1.3)
EGFR: 82 ML/MIN/1.73M2 — SIGNIFICANT CHANGE UP
EOSINOPHIL # BLD AUTO: 0.26 K/UL — SIGNIFICANT CHANGE UP (ref 0–0.5)
EOSINOPHIL NFR BLD AUTO: 2.1 % — SIGNIFICANT CHANGE UP (ref 0–6)
GLUCOSE SERPL-MCNC: 107 MG/DL — HIGH (ref 70–99)
HCT VFR BLD CALC: 24.9 % — LOW (ref 39–50)
HGB BLD-MCNC: 8.2 G/DL — LOW (ref 13–17)
IMM GRANULOCYTES NFR BLD AUTO: 1.2 % — HIGH (ref 0–0.9)
INR BLD: 1.03 — SIGNIFICANT CHANGE UP (ref 0.85–1.16)
LYMPHOCYTES # BLD AUTO: 2.5 K/UL — SIGNIFICANT CHANGE UP (ref 1–3.3)
LYMPHOCYTES # BLD AUTO: 20.1 % — SIGNIFICANT CHANGE UP (ref 13–44)
MAGNESIUM SERPL-MCNC: 2.3 MG/DL — SIGNIFICANT CHANGE UP (ref 1.6–2.6)
MCHC RBC-ENTMCNC: 29.8 PG — SIGNIFICANT CHANGE UP (ref 27–34)
MCHC RBC-ENTMCNC: 32.9 G/DL — SIGNIFICANT CHANGE UP (ref 32–36)
MCV RBC AUTO: 90.5 FL — SIGNIFICANT CHANGE UP (ref 80–100)
MONOCYTES # BLD AUTO: 1.16 K/UL — HIGH (ref 0–0.9)
MONOCYTES NFR BLD AUTO: 9.3 % — SIGNIFICANT CHANGE UP (ref 2–14)
NEUTROPHILS # BLD AUTO: 8.34 K/UL — HIGH (ref 1.8–7.4)
NEUTROPHILS NFR BLD AUTO: 67 % — SIGNIFICANT CHANGE UP (ref 43–77)
NRBC # BLD: 0 /100 WBCS — SIGNIFICANT CHANGE UP (ref 0–0)
PHOSPHATE SERPL-MCNC: 3 MG/DL — SIGNIFICANT CHANGE UP (ref 2.5–4.5)
PLATELET # BLD AUTO: 306 K/UL — SIGNIFICANT CHANGE UP (ref 150–400)
POTASSIUM SERPL-MCNC: 3.9 MMOL/L — SIGNIFICANT CHANGE UP (ref 3.5–5.3)
POTASSIUM SERPL-SCNC: 3.9 MMOL/L — SIGNIFICANT CHANGE UP (ref 3.5–5.3)
PROT SERPL-MCNC: 6.4 G/DL — SIGNIFICANT CHANGE UP (ref 6–8.3)
PROTHROM AB SERPL-ACNC: 12.1 SEC — SIGNIFICANT CHANGE UP (ref 9.9–13.4)
RBC # BLD: 2.75 M/UL — LOW (ref 4.2–5.8)
RBC # FLD: 15.7 % — HIGH (ref 10.3–14.5)
SODIUM SERPL-SCNC: 136 MMOL/L — SIGNIFICANT CHANGE UP (ref 135–145)
WBC # BLD: 12.45 K/UL — HIGH (ref 3.8–10.5)
WBC # FLD AUTO: 12.45 K/UL — HIGH (ref 3.8–10.5)

## 2024-11-27 PROCEDURE — 83880 ASSAY OF NATRIURETIC PEPTIDE: CPT

## 2024-11-27 PROCEDURE — 82947 ASSAY GLUCOSE BLOOD QUANT: CPT

## 2024-11-27 PROCEDURE — P9016: CPT

## 2024-11-27 PROCEDURE — 82550 ASSAY OF CK (CPK): CPT

## 2024-11-27 PROCEDURE — 94002 VENT MGMT INPAT INIT DAY: CPT

## 2024-11-27 PROCEDURE — 84132 ASSAY OF SERUM POTASSIUM: CPT

## 2024-11-27 PROCEDURE — 12032 INTMD RPR S/A/T/EXT 2.6-7.5: CPT | Mod: GC,58

## 2024-11-27 PROCEDURE — 85027 COMPLETE CBC AUTOMATED: CPT

## 2024-11-27 PROCEDURE — 84443 ASSAY THYROID STIM HORMONE: CPT

## 2024-11-27 PROCEDURE — 11043 DBRDMT MUSC&/FSCA 1ST 20/<: CPT | Mod: GC,58

## 2024-11-27 PROCEDURE — 71045 X-RAY EXAM CHEST 1 VIEW: CPT

## 2024-11-27 PROCEDURE — 83605 ASSAY OF LACTIC ACID: CPT

## 2024-11-27 PROCEDURE — 84481 FREE ASSAY (FT-3): CPT

## 2024-11-27 PROCEDURE — 84295 ASSAY OF SERUM SODIUM: CPT

## 2024-11-27 PROCEDURE — C1751: CPT

## 2024-11-27 PROCEDURE — C1894: CPT

## 2024-11-27 PROCEDURE — 85610 PROTHROMBIN TIME: CPT

## 2024-11-27 PROCEDURE — 86923 COMPATIBILITY TEST ELECTRIC: CPT

## 2024-11-27 PROCEDURE — 93005 ELECTROCARDIOGRAM TRACING: CPT

## 2024-11-27 PROCEDURE — 82553 CREATINE MB FRACTION: CPT

## 2024-11-27 PROCEDURE — 93306 TTE W/DOPPLER COMPLETE: CPT

## 2024-11-27 PROCEDURE — 97116 GAIT TRAINING THERAPY: CPT

## 2024-11-27 PROCEDURE — C9399: CPT

## 2024-11-27 PROCEDURE — C1769: CPT

## 2024-11-27 PROCEDURE — 83735 ASSAY OF MAGNESIUM: CPT

## 2024-11-27 PROCEDURE — 80061 LIPID PANEL: CPT

## 2024-11-27 PROCEDURE — 85025 COMPLETE CBC W/AUTO DIFF WBC: CPT

## 2024-11-27 PROCEDURE — 85576 BLOOD PLATELET AGGREGATION: CPT

## 2024-11-27 PROCEDURE — 36430 TRANSFUSION BLD/BLD COMPNT: CPT

## 2024-11-27 PROCEDURE — 82962 GLUCOSE BLOOD TEST: CPT

## 2024-11-27 PROCEDURE — P9045: CPT

## 2024-11-27 PROCEDURE — 82803 BLOOD GASES ANY COMBINATION: CPT

## 2024-11-27 PROCEDURE — 97161 PT EVAL LOW COMPLEX 20 MIN: CPT

## 2024-11-27 PROCEDURE — 80048 BASIC METABOLIC PNL TOTAL CA: CPT

## 2024-11-27 PROCEDURE — C1889: CPT

## 2024-11-27 PROCEDURE — 36415 COLL VENOUS BLD VENIPUNCTURE: CPT

## 2024-11-27 PROCEDURE — 84100 ASSAY OF PHOSPHORUS: CPT

## 2024-11-27 PROCEDURE — 86891 AUTOLOGOUS BLOOD OP SALVAGE: CPT

## 2024-11-27 PROCEDURE — 84439 ASSAY OF FREE THYROXINE: CPT

## 2024-11-27 PROCEDURE — 80053 COMPREHEN METABOLIC PANEL: CPT

## 2024-11-27 PROCEDURE — 86901 BLOOD TYPING SEROLOGIC RH(D): CPT

## 2024-11-27 PROCEDURE — 83036 HEMOGLOBIN GLYCOSYLATED A1C: CPT

## 2024-11-27 PROCEDURE — 86850 RBC ANTIBODY SCREEN: CPT

## 2024-11-27 PROCEDURE — 82330 ASSAY OF CALCIUM: CPT

## 2024-11-27 PROCEDURE — C1729: CPT

## 2024-11-27 PROCEDURE — 85014 HEMATOCRIT: CPT

## 2024-11-27 PROCEDURE — 85730 THROMBOPLASTIN TIME PARTIAL: CPT

## 2024-11-27 PROCEDURE — 81001 URINALYSIS AUTO W/SCOPE: CPT

## 2024-11-27 PROCEDURE — 93880 EXTRACRANIAL BILAT STUDY: CPT

## 2024-11-27 PROCEDURE — 94150 VITAL CAPACITY TEST: CPT

## 2024-11-27 PROCEDURE — 84484 ASSAY OF TROPONIN QUANT: CPT

## 2024-11-27 PROCEDURE — 86900 BLOOD TYPING SEROLOGIC ABO: CPT

## 2024-11-27 PROCEDURE — 71045 X-RAY EXAM CHEST 1 VIEW: CPT | Mod: 26

## 2024-11-27 RX ORDER — PANTOPRAZOLE SODIUM 40 MG/1
1 TABLET, DELAYED RELEASE ORAL
Qty: 30 | Refills: 0
Start: 2024-11-27 | End: 2024-12-26

## 2024-11-27 RX ORDER — ACETAMINOPHEN 500MG 500 MG/1
2 TABLET, COATED ORAL
Qty: 56 | Refills: 0
Start: 2024-11-27 | End: 2024-12-03

## 2024-11-27 RX ORDER — FUROSEMIDE 40 MG/1
1 TABLET ORAL
Qty: 7 | Refills: 0
Start: 2024-11-27 | End: 2024-12-03

## 2024-11-27 RX ORDER — METOPROLOL TARTRATE 100 MG/1
1 TABLET, FILM COATED ORAL
Qty: 60 | Refills: 0
Start: 2024-11-27 | End: 2024-12-26

## 2024-11-27 RX ORDER — OXYCODONE HYDROCHLORIDE 30 MG/1
1 TABLET ORAL
Qty: 42 | Refills: 0
Start: 2024-11-27 | End: 2024-12-03

## 2024-11-27 RX ORDER — AMLODIPINE BESYLATE 10 MG/1
1 TABLET ORAL
Qty: 30 | Refills: 0
Start: 2024-11-27 | End: 2024-12-26

## 2024-11-27 RX ORDER — POLYETHYLENE GLYCOL 3350 17 G/17G
17 POWDER, FOR SOLUTION ORAL
Qty: 119 | Refills: 0
Start: 2024-11-27 | End: 2024-12-03

## 2024-11-27 RX ORDER — AMIODARONE HYDROCHLORIDE 200 MG/1
1 TABLET ORAL
Qty: 30 | Refills: 0
Start: 2024-11-27 | End: 2024-12-26

## 2024-11-27 RX ORDER — POTASSIUM CHLORIDE 600 MG/1
1 TABLET, EXTENDED RELEASE ORAL
Qty: 7 | Refills: 0
Start: 2024-11-27 | End: 2024-12-03

## 2024-11-27 RX ORDER — SENNOSIDES 8.6 MG
2 TABLET ORAL
Qty: 14 | Refills: 0
Start: 2024-11-27 | End: 2024-12-03

## 2024-11-27 RX ADMIN — PANTOPRAZOLE SODIUM 40 MILLIGRAM(S): 40 TABLET, DELAYED RELEASE ORAL at 05:25

## 2024-11-27 RX ADMIN — METOPROLOL TARTRATE 25 MILLIGRAM(S): 100 TABLET, FILM COATED ORAL at 05:25

## 2024-11-27 RX ADMIN — GABAPENTIN 100 MILLIGRAM(S): 300 CAPSULE ORAL at 17:39

## 2024-11-27 RX ADMIN — AMLODIPINE BESYLATE 2.5 MILLIGRAM(S): 10 TABLET ORAL at 05:25

## 2024-11-27 RX ADMIN — Medication 500 MILLIGRAM(S): at 05:25

## 2024-11-27 RX ADMIN — CHLORHEXIDINE GLUCONATE 1 APPLICATION(S): 1.2 RINSE ORAL at 05:27

## 2024-11-27 RX ADMIN — FUROSEMIDE 40 MILLIGRAM(S): 40 TABLET ORAL at 05:25

## 2024-11-27 RX ADMIN — Medication 1 APPLICATION(S): at 05:25

## 2024-11-27 RX ADMIN — METOPROLOL TARTRATE 25 MILLIGRAM(S): 100 TABLET, FILM COATED ORAL at 11:45

## 2024-11-27 RX ADMIN — ACETAMINOPHEN 500MG 1000 MILLIGRAM(S): 500 TABLET, COATED ORAL at 00:00

## 2024-11-27 RX ADMIN — Medication 5000 UNIT(S): at 17:39

## 2024-11-27 RX ADMIN — Medication 10 MILLIGRAM(S): at 13:28

## 2024-11-27 RX ADMIN — METOPROLOL TARTRATE 25 MILLIGRAM(S): 100 TABLET, FILM COATED ORAL at 18:01

## 2024-11-27 RX ADMIN — ACETAMINOPHEN 500MG 1000 MILLIGRAM(S): 500 TABLET, COATED ORAL at 05:25

## 2024-11-27 RX ADMIN — ACETAMINOPHEN 500MG 1000 MILLIGRAM(S): 500 TABLET, COATED ORAL at 06:25

## 2024-11-27 RX ADMIN — OXYCODONE HYDROCHLORIDE 5 MILLIGRAM(S): 30 TABLET ORAL at 11:05

## 2024-11-27 RX ADMIN — AMIODARONE HYDROCHLORIDE 400 MILLIGRAM(S): 200 TABLET ORAL at 13:27

## 2024-11-27 RX ADMIN — AMIODARONE HYDROCHLORIDE 400 MILLIGRAM(S): 200 TABLET ORAL at 05:25

## 2024-11-27 RX ADMIN — OXYCODONE HYDROCHLORIDE 5 MILLIGRAM(S): 30 TABLET ORAL at 10:05

## 2024-11-27 RX ADMIN — Medication 81 MILLIGRAM(S): at 11:44

## 2024-11-27 NOTE — DISCHARGE NOTE PROVIDER - NSDCCPTREATMENT_GEN_ALL_CORE_FT
PRINCIPAL PROCEDURE  Procedure: CABG, with JOSTIN  Findings and Treatment: LIMA to LAD, Radial to OM, GSV to OM, LADI to RCA      SECONDARY PROCEDURE  Procedure: Fasciotomy of left forearm  Findings and Treatment:

## 2024-11-27 NOTE — PROGRESS NOTE ADULT - SUBJECTIVE AND OBJECTIVE BOX
Vascular Surgery Post-Op Note    Procedure: R forearm wound washed out with hydrogen peroxide and closed in layers with 2-0, 3-0 vicryl and skin with 4-0 monocryl    Diagnosis/Indication: RUE hematoma    Surgeon: Dr. Flores    S: Pt has no complaints. Denies right hand numbness or tingling. Pain controlled with medication.    O:  T(C): --  T(F): --  HR: 81 (11-27-24 @ 09:15) (81 - 81)  BP: 128/83 (11-27-24 @ 09:15) (128/83 - 128/83)  RR: 18 (11-27-24 @ 09:15) (18 - 18)  SpO2: 95% (11-27-24 @ 09:15) (95% - 95%)  Wt(kg): --                        8.2    12.45 )-----------( 306      ( 27 Nov 2024 04:01 )             24.9     11-27    136  |  101  |  20  ----------------------------<  107[H]  3.9   |  24  |  1.10    Ca    8.0[L]      27 Nov 2024 04:01  Phos  3.0     11-27  Mg     2.3     11-27    TPro  6.4  /  Alb  3.4  /  TBili  0.4  /  DBili  x   /  AST  47[H]  /  ALT  46[H]  /  AlkPhos  69  11-27      Gen: NAD, resting comfortably in bed  C/V: NSR  Pulm: Nonlabored breathing, no respiratory distress  Abd: soft, NT/ND  Extrem: RUE incision dressing C/D/I, hand warm, well perfused, cap rfill <2sec, 2+radial and ulnar pulse, motor/sensory intact without deficits      A/P: 49yMale s/p above procedure  Plan per primary team  Will follow    Case d/w attending and chief on call Vascular Surgery Post-Op Note    Procedure: R forearm wound washed out with hydrogen peroxide and closed in layers with 2-0, 3-0 vicryl and skin with 4-0 monocryl    Diagnosis/Indication: RUE hematoma    Surgeon: Dr. Flores    S: Pt has no complaints. Denies right hand numbness or tingling. Pain controlled with medication.    O:  T(C): --  T(F): --  HR: 81 (11-27-24 @ 09:15) (81 - 81)  BP: 128/83 (11-27-24 @ 09:15) (128/83 - 128/83)  RR: 18 (11-27-24 @ 09:15) (18 - 18)  SpO2: 95% (11-27-24 @ 09:15) (95% - 95%)  Wt(kg): --                        8.2    12.45 )-----------( 306      ( 27 Nov 2024 04:01 )             24.9     11-27    136  |  101  |  20  ----------------------------<  107[H]  3.9   |  24  |  1.10    Ca    8.0[L]      27 Nov 2024 04:01  Phos  3.0     11-27  Mg     2.3     11-27    TPro  6.4  /  Alb  3.4  /  TBili  0.4  /  DBili  x   /  AST  47[H]  /  ALT  46[H]  /  AlkPhos  69  11-27      Gen: NAD, resting comfortably in bed  C/V: NSR  Pulm: Nonlabored breathing, no respiratory distress  Abd: soft, NT/ND  Extrem: RUE incision dressing C/D/I, hand warm, well perfused, cap rfill <2sec, 2+radial and ulnar pulse, motor/sensory intact without deficits      A/P: 49yMale s/p above procedure  Plan per primary team  Vascular surgery will sign off at this time    Case d/w attending and chief on call

## 2024-11-27 NOTE — DISCHARGE NOTE PROVIDER - PROVIDER TOKENS
PROVIDER:[TOKEN:[[id number]],SCHEDULEDAPPT:[12/09/2024],SCHEDULEDAPPTTIME:[01:30 PM]],PROVIDER:[TOKEN:[[id number]],FOLLOWUP:[2 weeks]]

## 2024-11-27 NOTE — DISCHARGE NOTE PROVIDER - CARE PROVIDER_API CALL
AMANDA Dyson  Thoracic and Cardiac Surgery  [address]  [place] [address]  Phone: [phone]  Fax: [phone]  Scheduled Appointment: 12/09/2024 01:30 PM    Talat Flores  Vascular Surgery  [address]  [place] [address]  Phone: [phone]  Fax: [phone]  Follow Up Time: 2 weeks

## 2024-11-27 NOTE — PROGRESS NOTE ADULT - PROVIDER SPECIALTY LIST ADULT
CT Surgery
Critical Care
Critical Care
Vascular Surgery
CT Surgery
Critical Care
Critical Care
Vascular Surgery
Anesthesia
Critical Care
Vascular Surgery

## 2024-11-27 NOTE — DISCHARGE NOTE PROVIDER - HOSPITAL COURSE
Patient discussed on morning rounds with Dr. Dyson   Operation Date: 11/22 CABG X4 (LIMA to LAD, Radial to OM, GSV to OM, LADI to RCA), EF 45-50%  Intraop R arm fasciotomy   Primary Surgeon/Attending MD: Kiel   Referring Physician: self referring  _ _ _ _ _ _ _ _ _ _ _ _   HOSPITAL COURSE:   49M, current smoker (30 pack years) who presented to [facility] on 11/17 with chest pain radiating to back/shoulders x 2 days. On arrival to ED, he was given ASA and Brilinta 180 mg and Plavix 300 mg. Troponin were elevated  He was placed on a heparin gtt and admitted to telemetry unit at [facility]. On 11/18, he had cardiac cath via RRA which demonstrated 3vCAD. Patient transferred to [facility] under Dr. Dyson for CABG evaluation. On 11/22 patient underwent a CABGX4 LIMA to LAD, Radial to OM, GSV to OM, LADI to RCA . Intra-operatively Vascular surgery was consulted for right arm compartment syndrome requiring fasciotomy. Wound Vac was placed. Patient arrived to the ICU intubated on Levo. He was extubated overnight with good mobility and sensation in his right hand. POD#1 He went into Afib RVR for which he was started on amio and BB. POD#2 Norvasc was started for radial graft and his BB was increased. Patient does not want PACES enrollment. POD#4 Vascular closed arm in operating room. Sensation and mobility remained intact. He received Lasix in OR with a good response. His PW were removed without incident. Patient saturating 89-91% on room air. Per Dr. Dyson patient is a current smoker and can go home with these saturations.   _ _ _ _ _ _ _ _ _ _ _ _       DISCHARGE PHYSICAL EXAM:   GEN: NAD, looks comfortable  Neuro: A&Ox3.  No focal deficits.  Moving all extremities.   CV: S1S2, regular, no murmurs appreciated.  No carotid bruits.  No JVD  Lungs: decreased bibasilar lung sounds. No wheeze or rhonchi.   ABD: Soft, non-tender, non-distended.  +Bowel sounds  EXT: Warm and well perfused.  right arm fasciotomy incision c/d/i. . All Distal pulses palpable bilaterally.   Musculoskeletal: Moving all extremities with normal ROM, no joint swelling  _ _ _ _ _ _ _ _ _ _ _ _   REMOVAL CHECKLIST:         [ x] Epicardial wires         [x ] Stitches/tie downs,   If no, why?          [ n/a ] PICC/Midline,   If no, why?    _ _ _ _ _ _ _ _ _ _ _ _   MEDICATION DISCHARGE CHECKLIST     CABG         [ x] Aspirin, [  ] Contraindicated, Reason:         [ ] Plavix, [  x] Contraindicated, Reason:         [ x] Statin, [  ] Contraindicated, Reason:         [ x] Lasix , [  ] Contraindicated, Reason:              Duration:          [x ] Beta-Blocker, [  ] Contraindicated, Reason:       _ _ _ _ _ _ _ _ _ _ _   RELEVANT LABS/IMAGING:   < from: Xray Chest 1 View- PORTABLE-Routine (Xray Chest 1 View- PORTABLE-Routine .) (11.27.24 @ 04:27) >      IMPRESSION:    Similar appearance to prior exam11/26/2024. Postop change.   Hypoinflation. No consolidation or pneumothorax. No significant pleural   effusion.    --- End of Report ---    < end of copied text >      _  _ _ _ _ _ _ _ _ _ _   CLINICAL FOLLOW UP NEEDS:      [ n/a ] Lab work needed:      [ n/a ] Imaging needed:      [ n/a ] Home equipment            Type: (i.e. wound vac, pneumostat, prevena, wet/dry dressings, picc/midlines, MCOT, solorzano etc)   _ _ _ _ _ _ _ _ _ _ _ _   Over 35 minutes was spent with the patient reviewing the discharge material including medications, follow up appointments, recovery, concerning symptoms, and how to contact their health care providers if they have questions.   Patient discussed on morning rounds with Dr. Dyson   Operation Date: 11/22 CABG X4 (LIMA to LAD, Radial to OM, GSV to OM, LADI to RCA), EF 45-50%  Intraop R arm fasciotomy   Primary Surgeon/Attending MD: Kiel   Referring Physician: self referring  _ _ _ _ _ _ _ _ _ _ _ _   HOSPITAL COURSE:   49M, current smoker (30 pack years) who presented to [facility] on 11/17 with chest pain radiating to back/shoulders x 2 days. On arrival to ED, he was given ASA and Brilinta 180 mg and Plavix 300 mg. Troponin were elevated  He was placed on a heparin gtt and admitted to telemetry unit at [facility]. On 11/18, he had cardiac cath via RRA which demonstrated 3vCAD. Patient transferred to [facility] under Dr. Dyson for CABG evaluation. On 11/22 patient underwent a CABGX4 LIMA to LAD, Radial to OM, GSV to OM, LADI to RCA . Intra-operatively Vascular surgery was consulted for right arm compartment syndrome requiring fasciotomy. Wound Vac was placed. Patient arrived to the ICU intubated on Levo. He was extubated overnight with good mobility and sensation in his right hand. POD#1 He went into Afib RVR for which he was started on amio and BB. POD#2 Norvasc was started for radial graft and his BB was increased. Patient does not want PACES enrollment. POD#4 Vascular closed arm in operating room. Sensation and mobility remained intact. He received Lasix in OR with a good response. His PW were removed without incident. Patient saturating 89-91% on room air. Per Dr. Dyson patient is a current smoker and can go home with these saturations.   _ _ _ _ _ _ _ _ _ _ _ _       DISCHARGE PHYSICAL EXAM:   GEN: NAD, looks comfortable  Neuro: A&Ox3.  No focal deficits.  Moving all extremities.   CV: S1S2, regular, no murmurs appreciated.  No carotid bruits.  No JVD  Lungs: decreased bibasilar lung sounds. No wheeze or rhonchi.   ABD: Soft, non-tender, non-distended.  +Bowel sounds  EXT: Warm and well perfused.  right arm fasciotomy incision c/d/i. . All Distal pulses palpable bilaterally.   Musculoskeletal: Moving all extremities with normal ROM, no joint swelling  Incisions: MSI c/d/o Left Radial harvest C/d/i   _ _ _ _ _ _ _ _ _ _ _ _   REMOVAL CHECKLIST:         [ x] Epicardial wires         [x ] Stitches/tie downs,   If no, why?          [ n/a ] PICC/Midline,   If no, why?    _ _ _ _ _ _ _ _ _ _ _ _   MEDICATION DISCHARGE CHECKLIST     CABG         [ x] Aspirin, [  ] Contraindicated, Reason:         [ ] Plavix, [  x] Contraindicated, Reason:         [ x] Statin, [  ] Contraindicated, Reason:         [ x] Lasix , [  ] Contraindicated, Reason:              Duration:          [x ] Beta-Blocker, [  ] Contraindicated, Reason:       _ _ _ _ _ _ _ _ _ _ _   RELEVANT LABS/IMAGING:   < from: Xray Chest 1 View- PORTABLE-Routine (Xray Chest 1 View- PORTABLE-Routine .) (11.27.24 @ 04:27) >      IMPRESSION:    Similar appearance to prior exam11/26/2024. Postop change.   Hypoinflation. No consolidation or pneumothorax. No significant pleural   effusion.    --- End of Report ---    < end of copied text >      _  _ _ _ _ _ _ _ _ _ _   CLINICAL FOLLOW UP NEEDS:      [ n/a ] Lab work needed:      [ n/a ] Imaging needed:      [ n/a ] Home equipment            Type: (i.e. wound vac, pneumostat, prevena, wet/dry dressings, picc/midlines, MCOT, solorzano etc)   _ _ _ _ _ _ _ _ _ _ _ _   Over 35 minutes was spent with the patient reviewing the discharge material including medications, follow up appointments, recovery, concerning symptoms, and how to contact their health care providers if they have questions.

## 2024-11-27 NOTE — DISCHARGE NOTE NURSING/CASE MANAGEMENT/SOCIAL WORK - NSDCPEFALRISK_GEN_ALL_CORE
For information on Fall & Injury Prevention, visit: [email or web address] OR  [email or web address] OR  https://www.cdc.gov/steadi/patient.html 1

## 2024-11-27 NOTE — DISCHARGE NOTE PROVIDER - NSDCACTIVITY_GEN_ALL_CORE
1. Labs ordered -   2. Stay on current medication  3. Get covid booster after recovery from viral URI  4. Await PCR test results  5. F/U in 6mth   Showering allowed/Stairs allowed/Walking - Indoors allowed/No heavy lifting/straining/Walking - Outdoors allowed

## 2024-11-27 NOTE — PROGRESS NOTE ADULT - SUBJECTIVE AND OBJECTIVE BOX
Pre-op Diagnosis: R brachial sheath hematoma  Procedure:  closure of R brachial sheath hematoma  Surgeon: Dr. Flores    Consent: obtained                          8.0    14.80 )-----------( 233      ( 26 Nov 2024 04:04 )             24.7     11-26    134[L]  |  99  |  20  ----------------------------<  100[H]  4.0   |  25  |  1.02    Ca    7.7[L]      26 Nov 2024 04:04  Phos  2.0     11-26  Mg     2.4     11-26    TPro  6.1  /  Alb  3.4  /  TBili  0.4  /  DBili  x   /  AST  46[H]  /  ALT  30  /  AlkPhos  65  11-26    PT/INR - ( 26 Nov 2024 04:04 )   PT: 11.6 sec;   INR: 1.01          PTT - ( 26 Nov 2024 04:04 )  PTT:26.3 sec  Urinalysis Basic - ( 26 Nov 2024 04:04 )    Color: x / Appearance: x / SG: x / pH: x  Gluc: 100 mg/dL / Ketone: x  / Bili: x / Urobili: x   Blood: x / Protein: x / Nitrite: x   Leuk Esterase: x / RBC: x / WBC x   Sq Epi: x / Non Sq Epi: x / Bacteria: x        Type & Screen: O+. Ab -         (*With most recent within 72hrs of OR)  CXR: 11/26/24 lung markings/pulmonary vascular congestion appear partially improved compared to prior exam  EKG: in chart 11/17 sinus umm     Is patient on ACE/ARB? [x ]No [ ]Yes   *If yes, please hold any ACE/ARB the day of surgery    Is patient on Lantus at bedtime?  [x ]No [ ]Yes   *If yes, please half the dose the night before OR since patient will be NPO    Does patient have a contrast allergy? [x ]No [ ]Yes  *If yes, please pre-medicate per protocol    Is patient on anticoagulation? [ ]No [x ] Yes  *If yes, please discuss with team when to hold it    Is the patient Female and <54yo [ x]No [ ] Yes  If yes, pregnancy test must be documented in the chart    Is patient on dialysis? [x ]No [ ]Yes  *If yes, please obtain all labs including K level EARLY the day of surgery   *Also, will NOT require IVF past midnight    A/P: 49yMale pre-op for above procedure  1. NPO past midnight, except medications  2. IVF at midnight: per primary  3. [ ] Blood on hold, Units: none

## 2024-11-27 NOTE — DISCHARGE NOTE NURSING/CASE MANAGEMENT/SOCIAL WORK - PATIENT PORTAL LINK FT
You can access the FollowMyHealth Patient Portal offered by [facility] by registering at the following website: [email or web address]. By joining [facility]’s FollowMyHealth portal, you will also be able to view your health information using other applications (apps) compatible with our system.

## 2024-11-27 NOTE — PRE-ANESTHESIA EVALUATION ADULT - NSANTHOSAYNRD_GEN_A_CORE
No. MIRIAM screening performed.  STOP BANG Legend: 0-2 = LOW Risk; 3-4 = INTERMEDIATE Risk; 5-8 = HIGH Risk
No. MIRIAM screening performed.  STOP BANG Legend: 0-2 = LOW Risk; 3-4 = INTERMEDIATE Risk; 5-8 = HIGH Risk

## 2024-11-27 NOTE — DISCHARGE NOTE PROVIDER - NSDCFUADDINST_GEN_ALL_CORE_FT
-Walk daily as tolerated and use your incentive spirometer 10 times every hour while you are awake.     -Please weigh yourself daily. If you notice over a 3 pound weight gain in 3 days, this is a sign you are likely retaining too much fluid. It is imperative you call our right away with unexplained rapid weight gain.      -Please continue to wear the compression stockings given to you in the hospital at home. This is a way to prevent fluid from building up in your legs.     -No driving or strenuous activity/exercise until cleared by your surgeon.    -Gently clean your incisions with unscented/antibacterial soap and water, pat dry.  You may leave them open to air.    -Call your doctor if you have shortness of breath, chest pain not relieved by pain medication, dizziness, fever >100.5, or increased redness or drainage from incisions.    You had a MCOT monitor (an external cardiac rhythm monitoring device) placed on your day of discharge.  This helps us monitor your heart while you are out of the hospital for 30 days after discharge. Should your heart go into an abnormal or dangerous rhythm you will receieve a call from the MCOT team and your Structural Heart team of Doctors and PA's will be notified.    1. Keep the monitor within 30 feet of you at all times.  2. When you feel any symptom (chest pain, dizziness, palpitations, weakness, fatigue or anything outside of your normal), press the “Record Symptoms” button on the main phone of your phone  3. Shower or exercise as normal whilewearing the MCOT Patch. Do not swim or take a bath. Patch is water-resistant, not waterproof  4. When the battery is low on the phone or on the device, use the supplied . The monitor will show a warning message when the battery is low.  5. Do not remove the patch from yourskin after you begin monitoring. With normal wear, each patch should last 5 days. To replace the patch follow instructions in the MCOT box with the Patch Guide  6. Any issues with the MCOT device or phone please call Customer Service at [phone].  7. If you have any other questions at all please call the Structural Heart office at [phone]

## 2024-11-27 NOTE — DISCHARGE NOTE PROVIDER - CARE PROVIDERS DIRECT ADDRESSES
[email or web address]

## 2024-11-27 NOTE — BRIEF OPERATIVE NOTE - NSICDXBRIEFPROCEDURE_GEN_ALL_CORE_FT
PROCEDURES:  Fasciotomy of left forearm 22-Nov-2024 18:57:17  Yasmine Ramirez  
PROCEDURES:  Closure, wound, delayed 27-Nov-2024 08:56:13  Roman López  
PROCEDURES:  CABG, with JOSTIN 22-Nov-2024 10:29:55 LIMA to LAD, Radial to OM, GSV to OM, LADI to RCA Mirtha Escalera

## 2024-11-27 NOTE — BRIEF OPERATIVE NOTE - OPERATION/FINDINGS
R forearm wound washed out with hydrogen peroxide and closed in layers with 2-0, 3-0 vicryl and skin with 4-0 monocryl

## 2024-11-27 NOTE — DISCHARGE NOTE NURSING/CASE MANAGEMENT/SOCIAL WORK - FINANCIAL ASSISTANCE
[facility] provides services at a reduced cost to those who are determined to be eligible through [facility]’s financial assistance program. Information regarding [facility]’s financial assistance program can be found by going to [email or web address] or by calling [phone].

## 2024-11-27 NOTE — BRIEF OPERATIVE NOTE - NSICDXBRIEFPOSTOP_GEN_ALL_CORE_FT
POST-OP DIAGNOSIS:  CAD (coronary artery disease) 22-Nov-2024 10:30:44  Mirtha Escalera V  
POST-OP DIAGNOSIS:  S/P compartment syndrome decompression 22-Nov-2024 18:58:10  Yasmine Ramirez  
POST-OP DIAGNOSIS:  S/P compartment syndrome decompression 22-Nov-2024 18:58:10  Yasmine Ramirez

## 2024-11-27 NOTE — DISCHARGE NOTE PROVIDER - NSDCFUSCHEDAPPT_GEN_ALL_CORE_FT
AMANDA Dyson  [facility]  [facility] [address]  Scheduled Appointment: 12/09/2024

## 2024-11-27 NOTE — BRIEF OPERATIVE NOTE - NSICDXBRIEFPREOP_GEN_ALL_CORE_FT
PRE-OP DIAGNOSIS:  S/P compartment syndrome decompression 22-Nov-2024 18:57:45  Yasmine Ramriez  
PRE-OP DIAGNOSIS:  S/P compartment syndrome decompression 22-Nov-2024 18:57:45  Yasmine Ramirez  
PRE-OP DIAGNOSIS:  CAD (coronary artery disease) 22-Nov-2024 10:30:33  Mirtha Escalera V

## 2024-11-29 ENCOUNTER — NON-APPOINTMENT (OUTPATIENT)
Age: 49
End: 2024-11-29

## 2024-11-29 PROBLEM — Z95.1 S/P CABG (CORONARY ARTERY BYPASS GRAFT): Status: ACTIVE | Noted: 2024-11-29

## 2024-11-29 RX ORDER — FUROSEMIDE 40 MG/1
40 TABLET ORAL
Refills: 0 | Status: ACTIVE | COMMUNITY

## 2024-11-29 RX ORDER — SENNOSIDES 8.6 MG TABLETS 8.6 MG/1
8.6 TABLET ORAL
Refills: 0 | Status: ACTIVE | COMMUNITY

## 2024-11-29 RX ORDER — AMLODIPINE BESYLATE 2.5 MG/1
2.5 TABLET ORAL
Refills: 0 | Status: ACTIVE | COMMUNITY

## 2024-11-29 RX ORDER — ASPIRIN ENTERIC COATED TABLETS 81 MG 81 MG/1
81 TABLET, DELAYED RELEASE ORAL
Refills: 0 | Status: ACTIVE | COMMUNITY

## 2024-11-29 RX ORDER — AMIODARONE HYDROCHLORIDE 200 MG/1
200 TABLET ORAL
Refills: 0 | Status: ACTIVE | COMMUNITY

## 2024-11-29 RX ORDER — OXYCODONE 5 MG/1
5 TABLET ORAL
Refills: 0 | Status: ACTIVE | COMMUNITY

## 2024-11-29 RX ORDER — LORATADINE 10 MG
17 TABLET,DISINTEGRATING ORAL
Refills: 0 | Status: ACTIVE | COMMUNITY

## 2024-11-29 RX ORDER — POTASSIUM CHLORIDE 1500 MG/1
20 TABLET, FILM COATED, EXTENDED RELEASE ORAL
Refills: 0 | Status: ACTIVE | COMMUNITY

## 2024-11-29 RX ORDER — ATORVASTATIN CALCIUM 40 MG/1
40 TABLET, FILM COATED ORAL
Refills: 0 | Status: ACTIVE | COMMUNITY

## 2024-11-29 RX ORDER — PANTOPRAZOLE 40 MG/1
40 TABLET, DELAYED RELEASE ORAL
Refills: 0 | Status: ACTIVE | COMMUNITY

## 2024-12-02 ENCOUNTER — APPOINTMENT (OUTPATIENT)
Dept: CARE COORDINATION | Facility: HOME HEALTH | Age: 49
End: 2024-12-02
Payer: COMMERCIAL

## 2024-12-02 VITALS
WEIGHT: 213 LBS | SYSTOLIC BLOOD PRESSURE: 110 MMHG | RESPIRATION RATE: 16 BRPM | HEIGHT: 69 IN | BODY MASS INDEX: 31.55 KG/M2 | DIASTOLIC BLOOD PRESSURE: 64 MMHG | OXYGEN SATURATION: 96 % | HEART RATE: 82 BPM

## 2024-12-02 PROCEDURE — 99024 POSTOP FOLLOW-UP VISIT: CPT

## 2024-12-06 PROBLEM — F17.200 CURRENT SMOKER: Status: ACTIVE | Noted: 2024-12-06

## 2024-12-06 PROBLEM — T79.A0XA COMPARTMENT SYNDROME: Status: RESOLVED | Noted: 2024-12-06 | Resolved: 2024-12-06

## 2024-12-06 PROBLEM — I25.10 CAD (CORONARY ARTERY DISEASE): Status: ACTIVE | Noted: 2024-12-06

## 2024-12-09 ENCOUNTER — NON-APPOINTMENT (OUTPATIENT)
Age: 49
End: 2024-12-09

## 2024-12-09 ENCOUNTER — APPOINTMENT (OUTPATIENT)
Dept: CARDIOTHORACIC SURGERY | Facility: CLINIC | Age: 49
End: 2024-12-09
Payer: COMMERCIAL

## 2024-12-09 ENCOUNTER — TRANSCRIPTION ENCOUNTER (OUTPATIENT)
Age: 49
End: 2024-12-09

## 2024-12-09 ENCOUNTER — APPOINTMENT (OUTPATIENT)
Dept: VASCULAR SURGERY | Facility: CLINIC | Age: 49
End: 2024-12-09
Payer: COMMERCIAL

## 2024-12-09 ENCOUNTER — OUTPATIENT (OUTPATIENT)
Dept: OUTPATIENT SERVICES | Facility: HOSPITAL | Age: 49
LOS: 1 days | End: 2024-12-09
Payer: COMMERCIAL

## 2024-12-09 VITALS
HEART RATE: 74 BPM | BODY MASS INDEX: 30.96 KG/M2 | TEMPERATURE: 97.6 F | WEIGHT: 209 LBS | SYSTOLIC BLOOD PRESSURE: 118 MMHG | DIASTOLIC BLOOD PRESSURE: 59 MMHG | OXYGEN SATURATION: 98 % | HEIGHT: 69 IN

## 2024-12-09 VITALS
DIASTOLIC BLOOD PRESSURE: 67 MMHG | WEIGHT: 209 LBS | HEART RATE: 75 BPM | HEIGHT: 69 IN | SYSTOLIC BLOOD PRESSURE: 101 MMHG | BODY MASS INDEX: 30.96 KG/M2

## 2024-12-09 DIAGNOSIS — I25.10 ATHEROSCLEROTIC HEART DISEASE OF NATIVE CORONARY ARTERY W/OUT ANGINA PECTORIS: ICD-10-CM

## 2024-12-09 DIAGNOSIS — Z95.1 PRESENCE OF AORTOCORONARY BYPASS GRAFT: ICD-10-CM

## 2024-12-09 DIAGNOSIS — T79.A0XA COMPARTMENT SYNDROME, UNSPECIFIED, INITIAL ENCOUNTER: ICD-10-CM

## 2024-12-09 DIAGNOSIS — T79.A19A TRAUMATIC COMPARTMENT SYNDROME OF UNSPECIFIED UPPER EXTREMITY, INITIAL ENCOUNTER: ICD-10-CM

## 2024-12-09 DIAGNOSIS — F17.200 NICOTINE DEPENDENCE, UNSPECIFIED, UNCOMPLICATED: ICD-10-CM

## 2024-12-09 PROCEDURE — 99024 POSTOP FOLLOW-UP VISIT: CPT

## 2024-12-09 PROCEDURE — 71046 X-RAY EXAM CHEST 2 VIEWS: CPT

## 2024-12-09 PROCEDURE — 71046 X-RAY EXAM CHEST 2 VIEWS: CPT | Mod: 26

## 2024-12-11 ENCOUNTER — TRANSCRIPTION ENCOUNTER (OUTPATIENT)
Age: 49
End: 2024-12-11

## 2024-12-12 ENCOUNTER — TRANSCRIPTION ENCOUNTER (OUTPATIENT)
Age: 49
End: 2024-12-12

## 2024-12-17 ENCOUNTER — NON-APPOINTMENT (OUTPATIENT)
Age: 49
End: 2024-12-17

## 2024-12-21 ENCOUNTER — INPATIENT (INPATIENT)
Facility: HOSPITAL | Age: 49
LOS: 2 days | Discharge: HOME | DRG: 580 | End: 2024-12-24
Attending: FAMILY MEDICINE | Admitting: STUDENT IN AN ORGANIZED HEALTH CARE EDUCATION/TRAINING PROGRAM
Payer: COMMERCIAL

## 2024-12-21 VITALS — WEIGHT: 213.85 LBS | HEIGHT: 69 IN

## 2024-12-21 DIAGNOSIS — Z95.1 PRESENCE OF AORTOCORONARY BYPASS GRAFT: Chronic | ICD-10-CM

## 2024-12-21 DIAGNOSIS — L02.413 CUTANEOUS ABSCESS OF RIGHT UPPER LIMB: ICD-10-CM

## 2024-12-21 LAB
ALBUMIN SERPL ELPH-MCNC: 3.4 G/DL — SIGNIFICANT CHANGE UP (ref 3.3–5)
ALP SERPL-CCNC: 119 U/L — SIGNIFICANT CHANGE UP (ref 40–120)
ALT FLD-CCNC: 51 U/L — SIGNIFICANT CHANGE UP (ref 12–78)
ANION GAP SERPL CALC-SCNC: 3 MMOL/L — LOW (ref 5–17)
APTT BLD: 35.2 SEC — SIGNIFICANT CHANGE UP (ref 24.5–35.6)
AST SERPL-CCNC: 22 U/L — SIGNIFICANT CHANGE UP (ref 15–37)
BASOPHILS # BLD AUTO: 0.09 K/UL — SIGNIFICANT CHANGE UP (ref 0–0.2)
BASOPHILS NFR BLD AUTO: 0.7 % — SIGNIFICANT CHANGE UP (ref 0–2)
BILIRUB SERPL-MCNC: 0.4 MG/DL — SIGNIFICANT CHANGE UP (ref 0.2–1.2)
BLD GP AB SCN SERPL QL: SIGNIFICANT CHANGE UP
BUN SERPL-MCNC: 19 MG/DL — SIGNIFICANT CHANGE UP (ref 7–23)
CALCIUM SERPL-MCNC: 9.1 MG/DL — SIGNIFICANT CHANGE UP (ref 8.5–10.1)
CHLORIDE SERPL-SCNC: 106 MMOL/L — SIGNIFICANT CHANGE UP (ref 96–108)
CO2 SERPL-SCNC: 25 MMOL/L — SIGNIFICANT CHANGE UP (ref 22–31)
CREAT SERPL-MCNC: 1.3 MG/DL — SIGNIFICANT CHANGE UP (ref 0.5–1.3)
CRP SERPL-MCNC: 43.4 MG/ML — HIGH (ref 0–5)
EGFR: 67 ML/MIN/1.73M2 — SIGNIFICANT CHANGE UP
EOSINOPHIL # BLD AUTO: 1.26 K/UL — HIGH (ref 0–0.5)
EOSINOPHIL NFR BLD AUTO: 9.4 % — HIGH (ref 0–6)
ERYTHROCYTE [SEDIMENTATION RATE] IN BLOOD: 43 MM/HR — HIGH (ref 0–15)
GLUCOSE SERPL-MCNC: 156 MG/DL — HIGH (ref 70–99)
HCT VFR BLD CALC: 36 % — LOW (ref 39–50)
HGB BLD-MCNC: 11.5 G/DL — LOW (ref 13–17)
IMM GRANULOCYTES NFR BLD AUTO: 0.9 % — SIGNIFICANT CHANGE UP (ref 0–0.9)
INR BLD: 1.1 RATIO — SIGNIFICANT CHANGE UP (ref 0.85–1.16)
LACTATE SERPL-SCNC: 2 MMOL/L — SIGNIFICANT CHANGE UP (ref 0.7–2)
LYMPHOCYTES # BLD AUTO: 16.5 % — SIGNIFICANT CHANGE UP (ref 13–44)
LYMPHOCYTES # BLD AUTO: 2.21 K/UL — SIGNIFICANT CHANGE UP (ref 1–3.3)
MCHC RBC-ENTMCNC: 28.6 PG — SIGNIFICANT CHANGE UP (ref 27–34)
MCHC RBC-ENTMCNC: 31.9 G/DL — LOW (ref 32–36)
MCV RBC AUTO: 89.6 FL — SIGNIFICANT CHANGE UP (ref 80–100)
MONOCYTES # BLD AUTO: 0.98 K/UL — HIGH (ref 0–0.9)
MONOCYTES NFR BLD AUTO: 7.3 % — SIGNIFICANT CHANGE UP (ref 2–14)
NEUTROPHILS # BLD AUTO: 8.74 K/UL — HIGH (ref 1.8–7.4)
NEUTROPHILS NFR BLD AUTO: 65.2 % — SIGNIFICANT CHANGE UP (ref 43–77)
PLATELET # BLD AUTO: 415 K/UL — HIGH (ref 150–400)
POTASSIUM SERPL-MCNC: 3.9 MMOL/L — SIGNIFICANT CHANGE UP (ref 3.5–5.3)
POTASSIUM SERPL-SCNC: 3.9 MMOL/L — SIGNIFICANT CHANGE UP (ref 3.5–5.3)
PROT SERPL-MCNC: 8.2 GM/DL — SIGNIFICANT CHANGE UP (ref 6–8.3)
PROTHROM AB SERPL-ACNC: 12.6 SEC — SIGNIFICANT CHANGE UP (ref 9.9–13.4)
RBC # BLD: 4.02 M/UL — LOW (ref 4.2–5.8)
RBC # FLD: 14 % — SIGNIFICANT CHANGE UP (ref 10.3–14.5)
SODIUM SERPL-SCNC: 134 MMOL/L — LOW (ref 135–145)
WBC # BLD: 13.4 K/UL — HIGH (ref 3.8–10.5)
WBC # FLD AUTO: 13.4 K/UL — HIGH (ref 3.8–10.5)

## 2024-12-21 PROCEDURE — 86850 RBC ANTIBODY SCREEN: CPT

## 2024-12-21 PROCEDURE — 83735 ASSAY OF MAGNESIUM: CPT

## 2024-12-21 PROCEDURE — 82607 VITAMIN B-12: CPT

## 2024-12-21 PROCEDURE — 73201 CT UPPER EXTREMITY W/DYE: CPT | Mod: 26,RT,MC

## 2024-12-21 PROCEDURE — 99222 1ST HOSP IP/OBS MODERATE 55: CPT

## 2024-12-21 PROCEDURE — 99285 EMERGENCY DEPT VISIT HI MDM: CPT

## 2024-12-21 PROCEDURE — 87070 CULTURE OTHR SPECIMN AEROBIC: CPT

## 2024-12-21 PROCEDURE — 87075 CULTR BACTERIA EXCEPT BLOOD: CPT

## 2024-12-21 PROCEDURE — 87077 CULTURE AEROBIC IDENTIFY: CPT

## 2024-12-21 PROCEDURE — 85652 RBC SED RATE AUTOMATED: CPT

## 2024-12-21 PROCEDURE — 36415 COLL VENOUS BLD VENIPUNCTURE: CPT

## 2024-12-21 PROCEDURE — 97165 OT EVAL LOW COMPLEX 30 MIN: CPT | Mod: GO

## 2024-12-21 PROCEDURE — 97116 GAIT TRAINING THERAPY: CPT | Mod: GP

## 2024-12-21 PROCEDURE — 86140 C-REACTIVE PROTEIN: CPT

## 2024-12-21 PROCEDURE — 84100 ASSAY OF PHOSPHORUS: CPT

## 2024-12-21 PROCEDURE — 93010 ELECTROCARDIOGRAM REPORT: CPT

## 2024-12-21 PROCEDURE — 85730 THROMBOPLASTIN TIME PARTIAL: CPT

## 2024-12-21 PROCEDURE — 86900 BLOOD TYPING SEROLOGIC ABO: CPT

## 2024-12-21 PROCEDURE — 85610 PROTHROMBIN TIME: CPT

## 2024-12-21 PROCEDURE — 82962 GLUCOSE BLOOD TEST: CPT

## 2024-12-21 PROCEDURE — 97530 THERAPEUTIC ACTIVITIES: CPT | Mod: GP

## 2024-12-21 PROCEDURE — 87102 FUNGUS ISOLATION CULTURE: CPT

## 2024-12-21 PROCEDURE — 82728 ASSAY OF FERRITIN: CPT

## 2024-12-21 PROCEDURE — 85027 COMPLETE CBC AUTOMATED: CPT

## 2024-12-21 PROCEDURE — 83540 ASSAY OF IRON: CPT

## 2024-12-21 PROCEDURE — 85025 COMPLETE CBC W/AUTO DIFF WBC: CPT

## 2024-12-21 PROCEDURE — 80048 BASIC METABOLIC PNL TOTAL CA: CPT

## 2024-12-21 PROCEDURE — 83550 IRON BINDING TEST: CPT

## 2024-12-21 PROCEDURE — 82746 ASSAY OF FOLIC ACID SERUM: CPT

## 2024-12-21 PROCEDURE — 80053 COMPREHEN METABOLIC PANEL: CPT

## 2024-12-21 PROCEDURE — 86901 BLOOD TYPING SEROLOGIC RH(D): CPT

## 2024-12-21 PROCEDURE — 87186 SC STD MICRODIL/AGAR DIL: CPT

## 2024-12-21 RX ORDER — MORPHINE SULFATE 15 MG
4 TABLET, EXTENDED RELEASE ORAL ONCE
Refills: 0 | Status: DISCONTINUED | OUTPATIENT
Start: 2024-12-21 | End: 2024-12-21

## 2024-12-21 RX ORDER — ROSUVASTATIN 40 MG/1
40 TABLET, FILM COATED ORAL AT BEDTIME
Refills: 0 | Status: DISCONTINUED | OUTPATIENT
Start: 2024-12-21 | End: 2024-12-24

## 2024-12-21 RX ORDER — POLYETHYLENE GLYCOL 3350 17 G/DOSE
17 POWDER (GRAM) ORAL DAILY
Refills: 0 | Status: DISCONTINUED | OUTPATIENT
Start: 2024-12-21 | End: 2024-12-24

## 2024-12-21 RX ORDER — INSULIN LISPRO 100/ML
VIAL (ML) SUBCUTANEOUS
Refills: 0 | Status: DISCONTINUED | OUTPATIENT
Start: 2024-12-21 | End: 2024-12-22

## 2024-12-21 RX ORDER — MAGNESIUM HYDROXIDE 400 MG/5ML
30 SUSPENSION, ORAL (FINAL DOSE FORM) ORAL DAILY
Refills: 0 | Status: DISCONTINUED | OUTPATIENT
Start: 2024-12-21 | End: 2024-12-24

## 2024-12-21 RX ORDER — DEXTROSE MONOHYDRATE 25 G/50ML
25 INJECTION, SOLUTION INTRAVENOUS ONCE
Refills: 0 | Status: DISCONTINUED | OUTPATIENT
Start: 2024-12-21 | End: 2024-12-24

## 2024-12-21 RX ORDER — ATORVASTATIN CALCIUM 40 MG/1
40 TABLET, FILM COATED ORAL AT BEDTIME
Refills: 0 | Status: DISCONTINUED | OUTPATIENT
Start: 2024-12-21 | End: 2024-12-21

## 2024-12-21 RX ORDER — HYDROMORPHONE HCL 4 MG
0.5 TABLET ORAL
Refills: 0 | Status: DISCONTINUED | OUTPATIENT
Start: 2024-12-21 | End: 2024-12-21

## 2024-12-21 RX ORDER — GLUCAGON INJECTION, SOLUTION 0.5 MG/.1ML
1 INJECTION, SOLUTION SUBCUTANEOUS ONCE
Refills: 0 | Status: DISCONTINUED | OUTPATIENT
Start: 2024-12-21 | End: 2024-12-24

## 2024-12-21 RX ORDER — GINKGO BILOBA 40 MG
3 CAPSULE ORAL AT BEDTIME
Refills: 0 | Status: DISCONTINUED | OUTPATIENT
Start: 2024-12-21 | End: 2024-12-24

## 2024-12-21 RX ORDER — ACETAMINOPHEN 80 MG/.8ML
1000 SOLUTION/ DROPS ORAL ONCE
Refills: 0 | Status: COMPLETED | OUTPATIENT
Start: 2024-12-21 | End: 2024-12-22

## 2024-12-21 RX ORDER — OXYCODONE HCL 15 MG
10 TABLET ORAL ONCE
Refills: 0 | Status: DISCONTINUED | OUTPATIENT
Start: 2024-12-21 | End: 2024-12-21

## 2024-12-21 RX ORDER — OXYCODONE HCL 15 MG
2.5 TABLET ORAL EVERY 4 HOURS
Refills: 0 | Status: DISCONTINUED | OUTPATIENT
Start: 2024-12-21 | End: 2024-12-24

## 2024-12-21 RX ORDER — METOPROLOL TARTRATE 50 MG
25 TABLET ORAL DAILY
Refills: 0 | Status: DISCONTINUED | OUTPATIENT
Start: 2024-12-21 | End: 2024-12-24

## 2024-12-21 RX ORDER — ASPIRIN 81 MG
81 TABLET, DELAYED RELEASE (ENTERIC COATED) ORAL DAILY
Refills: 0 | Status: DISCONTINUED | OUTPATIENT
Start: 2024-12-21 | End: 2024-12-24

## 2024-12-21 RX ORDER — ONDANSETRON 4 MG/1
4 TABLET ORAL ONCE
Refills: 0 | Status: COMPLETED | OUTPATIENT
Start: 2024-12-21 | End: 2024-12-21

## 2024-12-21 RX ORDER — SENNOSIDES 8.6 MG/1
2 TABLET, FILM COATED ORAL AT BEDTIME
Refills: 0 | Status: DISCONTINUED | OUTPATIENT
Start: 2024-12-21 | End: 2024-12-24

## 2024-12-21 RX ORDER — MAG HYDROX/ALUMINUM HYD/SIMETH 200-200-20
30 SUSPENSION, ORAL (FINAL DOSE FORM) ORAL EVERY 4 HOURS
Refills: 0 | Status: DISCONTINUED | OUTPATIENT
Start: 2024-12-21 | End: 2024-12-24

## 2024-12-21 RX ORDER — HYDROMORPHONE HCL 4 MG
0.5 TABLET ORAL ONCE
Refills: 0 | Status: DISCONTINUED | OUTPATIENT
Start: 2024-12-21 | End: 2024-12-21

## 2024-12-21 RX ORDER — TRAMADOL HYDROCHLORIDE 50 MG/1
25 TABLET ORAL EVERY 4 HOURS
Refills: 0 | Status: DISCONTINUED | OUTPATIENT
Start: 2024-12-21 | End: 2024-12-24

## 2024-12-21 RX ORDER — METRONIDAZOLE 250 MG/1
500 TABLET ORAL EVERY 8 HOURS
Refills: 0 | Status: DISCONTINUED | OUTPATIENT
Start: 2024-12-21 | End: 2024-12-22

## 2024-12-21 RX ORDER — PANTOPRAZOLE 40 MG/1
40 TABLET, DELAYED RELEASE ORAL
Refills: 0 | Status: DISCONTINUED | OUTPATIENT
Start: 2024-12-21 | End: 2024-12-24

## 2024-12-21 RX ORDER — SODIUM CHLORIDE 9 MG/ML
1000 INJECTION, SOLUTION INTRAVENOUS
Refills: 0 | Status: DISCONTINUED | OUTPATIENT
Start: 2024-12-21 | End: 2024-12-24

## 2024-12-21 RX ORDER — DEXTROSE MONOHYDRATE 25 G/50ML
12.5 INJECTION, SOLUTION INTRAVENOUS ONCE
Refills: 0 | Status: DISCONTINUED | OUTPATIENT
Start: 2024-12-21 | End: 2024-12-24

## 2024-12-21 RX ORDER — CEFAZOLIN SODIUM 1 G
2000 VIAL (EA) INJECTION EVERY 8 HOURS
Refills: 0 | Status: DISCONTINUED | OUTPATIENT
Start: 2024-12-21 | End: 2024-12-22

## 2024-12-21 RX ORDER — NALOXONE HCL 0.4 MG/ML
0.4 VIAL (ML) INJECTION ONCE
Refills: 0 | Status: DISCONTINUED | OUTPATIENT
Start: 2024-12-21 | End: 2024-12-24

## 2024-12-21 RX ORDER — METOPROLOL TARTRATE 50 MG
50 TABLET ORAL
Refills: 0 | Status: DISCONTINUED | OUTPATIENT
Start: 2024-12-21 | End: 2024-12-21

## 2024-12-21 RX ORDER — CEFAZOLIN SODIUM 1 G
2000 VIAL (EA) INJECTION EVERY 8 HOURS
Refills: 0 | Status: DISCONTINUED | OUTPATIENT
Start: 2024-12-21 | End: 2024-12-21

## 2024-12-21 RX ORDER — VANCOMYCIN HYDROCHLORIDE 5 G/100ML
1000 INJECTION, POWDER, LYOPHILIZED, FOR SOLUTION INTRAVENOUS ONCE
Refills: 0 | Status: DISCONTINUED | OUTPATIENT
Start: 2024-12-21 | End: 2024-12-21

## 2024-12-21 RX ORDER — VANCOMYCIN HYDROCHLORIDE 5 G/100ML
1500 INJECTION, POWDER, LYOPHILIZED, FOR SOLUTION INTRAVENOUS ONCE
Refills: 0 | Status: COMPLETED | OUTPATIENT
Start: 2024-12-21 | End: 2024-12-21

## 2024-12-21 RX ORDER — DEXTROSE MONOHYDRATE 25 G/50ML
15 INJECTION, SOLUTION INTRAVENOUS ONCE
Refills: 0 | Status: DISCONTINUED | OUTPATIENT
Start: 2024-12-21 | End: 2024-12-24

## 2024-12-21 RX ORDER — SODIUM CHLORIDE 9 MG/ML
1000 INJECTION, SOLUTION INTRAVENOUS
Refills: 0 | Status: DISCONTINUED | OUTPATIENT
Start: 2024-12-21 | End: 2024-12-21

## 2024-12-21 RX ORDER — ONDANSETRON 4 MG/1
4 TABLET ORAL EVERY 8 HOURS
Refills: 0 | Status: DISCONTINUED | OUTPATIENT
Start: 2024-12-21 | End: 2024-12-24

## 2024-12-21 RX ORDER — ACETAMINOPHEN 80 MG/.8ML
1000 SOLUTION/ DROPS ORAL EVERY 8 HOURS
Refills: 0 | Status: DISCONTINUED | OUTPATIENT
Start: 2024-12-21 | End: 2024-12-24

## 2024-12-21 RX ORDER — INFLUENZA A VIRUS A/WISCONSIN/588/2019 (H1N1) RECOMBINANT HEMAGGLUTININ ANTIGEN, INFLUENZA A VIRUS A/DARWIN/6/2021 (H3N2) RECOMBINANT HEMAGGLUTININ ANTIGEN, INFLUENZA B VIRUS B/AUSTRIA/1359417/2021 RECOMBINANT HEMAGGLUTININ ANTIGEN, AND INFLUENZA B VIRUS B/PHUKET/3073/2013 RECOMBINANT HEMAGGLUTININ ANTIGEN 45; 45; 45; 45 UG/.5ML; UG/.5ML; UG/.5ML; UG/.5ML
0.5 INJECTION INTRAMUSCULAR ONCE
Refills: 0 | Status: COMPLETED | OUTPATIENT
Start: 2024-12-21 | End: 2024-12-21

## 2024-12-21 RX ORDER — BISACODYL 5 MG
5 TABLET, DELAYED RELEASE (ENTERIC COATED) ORAL DAILY
Refills: 0 | Status: DISCONTINUED | OUTPATIENT
Start: 2024-12-21 | End: 2024-12-24

## 2024-12-21 RX ORDER — OXYCODONE HCL 15 MG
5 TABLET ORAL EVERY 4 HOURS
Refills: 0 | Status: DISCONTINUED | OUTPATIENT
Start: 2024-12-21 | End: 2024-12-24

## 2024-12-21 RX ORDER — VANCOMYCIN HYDROCHLORIDE 5 G/100ML
1500 INJECTION, POWDER, LYOPHILIZED, FOR SOLUTION INTRAVENOUS ONCE
Refills: 0 | Status: DISCONTINUED | OUTPATIENT
Start: 2024-12-21 | End: 2024-12-21

## 2024-12-21 RX ORDER — SODIUM CHLORIDE 9 MG/ML
500 INJECTION, SOLUTION INTRAMUSCULAR; INTRAVENOUS; SUBCUTANEOUS ONCE
Refills: 0 | Status: COMPLETED | OUTPATIENT
Start: 2024-12-21 | End: 2024-12-21

## 2024-12-21 RX ORDER — ACETAMINOPHEN 80 MG/.8ML
650 SOLUTION/ DROPS ORAL EVERY 6 HOURS
Refills: 0 | Status: DISCONTINUED | OUTPATIENT
Start: 2024-12-21 | End: 2024-12-21

## 2024-12-21 RX ADMIN — Medication 0.5 MILLIGRAM(S): at 20:55

## 2024-12-21 RX ADMIN — ONDANSETRON 4 MILLIGRAM(S): 4 TABLET ORAL at 10:21

## 2024-12-21 RX ADMIN — Medication 2000 MILLIGRAM(S): at 21:52

## 2024-12-21 RX ADMIN — SODIUM CHLORIDE 1000 MILLILITER(S): 9 INJECTION, SOLUTION INTRAMUSCULAR; INTRAVENOUS; SUBCUTANEOUS at 10:21

## 2024-12-21 RX ADMIN — Medication 25 MILLIGRAM(S): at 23:32

## 2024-12-21 RX ADMIN — Medication 10 MILLIGRAM(S): at 20:55

## 2024-12-21 RX ADMIN — VANCOMYCIN HYDROCHLORIDE 300 MILLIGRAM(S): 5 INJECTION, POWDER, LYOPHILIZED, FOR SOLUTION INTRAVENOUS at 12:30

## 2024-12-21 RX ADMIN — ROSUVASTATIN 40 MILLIGRAM(S): 40 TABLET, FILM COATED ORAL at 23:31

## 2024-12-21 RX ADMIN — Medication 0.5 MILLIGRAM(S): at 20:45

## 2024-12-21 RX ADMIN — Medication 0.5 MILLIGRAM(S): at 22:04

## 2024-12-21 RX ADMIN — METRONIDAZOLE 100 MILLIGRAM(S): 250 TABLET ORAL at 23:29

## 2024-12-21 RX ADMIN — Medication 2000 MILLIGRAM(S): at 12:29

## 2024-12-21 RX ADMIN — Medication 4 MILLIGRAM(S): at 10:21

## 2024-12-21 NOTE — H&P ADULT - ASSESSMENT
49M admitted for right forearm abscess    #Abscess  -S/p I&D by ortho (12/21/24)  -C/w empiric vanc/cefe/flagyl  -ID consult    #CAD s/p CABG  -Asa 81, metoprolol 25 qhs and rosuvastatin 40 qhs.  -C/w home meds.     #Anemia  -Ferrous sulfate, hold for now d/t acute infection.     #DVT ppx- Lovenox

## 2024-12-21 NOTE — H&P ADULT - HISTORY OF PRESENT ILLNESS
49M w/ PMHx of smoking, presents to ED for wound check. Patient admitted at [facility] for ACS, cardiac cath showed 3V occlusion  and patient was transferred to [facility] for eval of CABG. Patient had R radial artery harvest and quadruple bypass on 11/22/24. Procedure complicated by RUE compartment syndrome, hematoma in RUE 2/2 R radial arterial line requiring intraop vascular consult and fasciotomy. On 11/27/24 RUE forearm wound washed out with hydrogen peroxide and closed in layers. Patient noted after the recent procedure he had no issues until 2-3 days ago opened up and had purulent drainage with erythema and warmth to area. In ED patient had CT noted for right forearm abscess, see full imaging results below. Patient underwent I&D by orthopaedics (12/21/24), Admitted to  for further care.

## 2024-12-21 NOTE — H&P ADULT - NSHPLABSRESULTS_GEN_ALL_CORE
LABS:  cret                        11.5   13.40 )-----------( 415      ( 21 Dec 2024 10:28 )             36.0     12-21    134[L]  |  106  |  19  ----------------------------<  156[H]  3.9   |  25  |  1.30    Ca    9.1      21 Dec 2024 10:28    TPro  8.2  /  Alb  3.4  /  TBili  0.4  /  DBili  x   /  AST  22  /  ALT  51  /  AlkPhos  119  12-21    PT/INR - ( 21 Dec 2024 19:00 )   PT: 12.6 sec;   INR: 1.10 ratio         PTT - ( 21 Dec 2024 19:00 )  PTT:35.2 sec    < from: CT Upper Extremity w/ IV Cont, Right (12.21.24 @ 10:56) >    IMPRESSION:    Small volume neck mixed subcutaneous and intermuscular abscess along the   lateral margin of right proximal to mid forearm with deep margin   extending between the flexor and extensor compartments and abutting the   radial vascular bundle. No tracking soft tissue emphysema.    --- End of Report ---    < end of copied text >

## 2024-12-21 NOTE — ED PROVIDER NOTE - OBJECTIVE STATEMENT
49 year old male with PMHx of current smoker  presents to ED for wound check. Patient admitted at [facility] for ACS, cardiac cath showed 3V occlusion  and patient was transferred to [facility] for eval of CABG. Patient had R radial artery harvest and quadruple bypass on 11/22/24. Procedure complicated by RUE compartment syndrome, hematoma in RUE 2/2 R radial arterial line requiring intraop vascular consult and fasciotomy. On 11/27/24 RUE forearm wound washed out with hydrogen peroxide and closed in layers. Patient noted after the recent procedure he had no issues until 2-3 days ago opened up and had purulent drainage with erythema and warmth to area. Patient came to ED per recommendation of  his surgeon Dr Talat Flores to have RUE evaluated and requesting at CT w/ IV contrast to rule out underlying abscess.

## 2024-12-21 NOTE — PROGRESS NOTE ADULT - SUBJECTIVE AND OBJECTIVE BOX
Postop Check    Patient tolerated the procedure well. Patient seen and examined at bedside. No acute complaints at this time. Pain well controlled. Denies chest pain, shortness of breath, nausea or vomiting.       LABS:                        11.5   13.40 )-----------( 415      ( 21 Dec 2024 10:28 )             36.0     12-21    134[L]  |  106  |  19  ----------------------------<  156[H]  3.9   |  25  |  1.30    Ca    9.1      21 Dec 2024 10:28    TPro  8.2  /  Alb  3.4  /  TBili  0.4  /  DBili  x   /  AST  22  /  ALT  51  /  AlkPhos  119  12-21    PT/INR - ( 21 Dec 2024 19:00 )   PT: 12.6 sec;   INR: 1.10 ratio         PTT - ( 21 Dec 2024 19:00 )  PTT:35.2 sec  Urinalysis Basic - ( 21 Dec 2024 10:28 )    Color: x / Appearance: x / SG: x / pH: x  Gluc: 156 mg/dL / Ketone: x  / Bili: x / Urobili: x   Blood: x / Protein: x / Nitrite: x   Leuk Esterase: x / RBC: x / WBC x   Sq Epi: x / Non Sq Epi: x / Bacteria: x        VITAL SIGNS:  T(C): 36.6 (12-22-24 @ 00:00), Max: 36.9 (12-21-24 @ 09:16)  HR: 85 (12-22-24 @ 00:00) (64 - 85)  BP: 109/75 (12-22-24 @ 00:00) (106/68 - 132/90)  RR: 18 (12-22-24 @ 00:00) (11 - 18)  SpO2: 95% (12-22-24 @ 00:00) (95% - 100%)      PE:    General: NAD, resting comfortably in bed  RUE:   Dressing C/D/I  Compartments soft and compressible  +Axillary/Musculocutaneous/Radial/Median/AIN/PIN intact  SILT med/rad/uln/labc/ax  fingers wwp      A/P:  49y M s/p R forearm I&D POD 0    -ortho to advance packing daily, to be fully removed POD 2  -WBAT RUE  -Pain Control  -DVT ppx per primary  -Continue abx, ID consult  -FU AM Labs, trend CRP q48  -FU OR cx, BCx  -Rest, ice, compress and elevate the extremity as needed  -Incentive Spirometry  -Medical comanagement appreciated  -dispo pending medical course  -will discuss with Dr. Cardoso and advise with changes to plan

## 2024-12-21 NOTE — PATIENT PROFILE ADULT - FUNCTIONAL SCREEN CURRENT LEVEL: COMMUNICATION, MLM
"  Assessment & Plan     (M54.2) Cervicalgia  (primary encounter diagnosis)  (M54.12) Cervical radiculopathy  (M62.248) Muscle spasm  Plan: DULoxetine (CYMBALTA) 30 MG capsule        Pain stable. Saw Dr. Bravo who ordered an injection. Will also increase her Cymbalta to 30 mg BID from 20 mg BID and reassess in 3 months. Declines to come in sooner. Will also continue f/up with Dr. Bravo.     (J45.20) Mild intermittent reactive airway disease without complication  Comment: controlled  Plan: ACT 23. Continue albuterol as needed.     (J30.2) Seasonal allergic rhinitis, unspecified trigger  Comment: controlled  Plan: Continue current medications.     The longitudinal plan of care for the diagnosis(es)/condition(s) as documented were addressed during this visit. Due to the added complexity in care, I will continue to support Rj in the subsequent management and with ongoing continuity of care.    No follow-ups on file.    Subjective   Rj is a 71 year old, presenting for the following health issues:  Back Pain and Asthma    HPI     Chronic/Recurring Back Pain Follow Up    Where is your back pain located? (Select all that apply) neck - bilateral  How would you describe your back pain?  sharp and stabbing  Where does your back pain spread?  Head  Since your last clinic visit for back pain, how has your pain changed? gradually worsening  Does your back pain interfere with your job? Not applicable  Since your last visit, have you tried any new treatment? Yes -  was started on Cymbalta on 7/9/24.  Unsure if this is helping. Denies any side effects. No longer is using the Robaxin.   Trying to walk more.     Recently saw Dr. Bravo who ordered an injection. Plans to have this done in [place].     Follows with Dr. Bravo. Cervical MRI done on 6/26/24.                       \"IMPRESSION:   Worsening degenerative changes in the left C3-4 facet joint with  reactive edema. This edema is new when compared to the prior study.   " "  Postoperative changes at C6-7 are new when compared to the prior  study. Metallic artifact limits evaluation. Images suggest a dorsal  inferior endplate spur impinging upon the sac and cord without  evidence of apparent cord myelopathy.     Moderate central canal stenosis now seen at C4-5, primarily related to  ligamentum flavum hypertrophy.     Degenerative changes otherwise throughout the cervical spine are  similar in appearance compared to the prior study.     JANNETTE YO MD\"         Asthma Follow-Up    Uses albuterol as needed. Rare use.     Seasonal allergies controlled with Zyrtec with Flonase. Stable.     Was ACT completed today?  Yes        8/21/2024    11:17 AM   ACT Total Scores   ACT TOTAL SCORE (Goal Greater than or Equal to 20) 23   In the past 12 months, how many times did you visit the emergency room for your asthma without being admitted to the hospital? 0   In the past 12 months, how many times were you hospitalized overnight because of your asthma? 0        How many days per week do you miss taking your asthma controller medication?  I do not have an asthma controller medication  Please describe any recent triggers for your asthma: smoke and pollens  Have you had any Emergency Room Visits, Urgent Care Visits, or Hospital Admissions since your last office visit?  No      Review of Systems  Constitutional, HEENT, cardiovascular, pulmonary, gi and gu systems are negative, except as otherwise noted.      Objective    /74   Pulse 72   Temp 96.9  F (36.1  C) (Tympanic)   Resp 16   Ht 1.549 m (5' 1\")   Wt 80.7 kg (177 lb 14.4 oz)   SpO2 97%   BMI 33.61 kg/m    Body mass index is 33.61 kg/m .  Physical Exam   GENERAL: alert and no distress, obese   EYES: Eyes grossly normal to inspection, PERRL and conjunctivae and sclerae normal  HENT: ear canals and TM's normal, nose and mouth without ulcers or lesions  NECK: no adenopathy, no asymmetry, masses, or scars  RESP: lungs clear to " auscultation - no rales, rhonchi or wheezes  CV: regular rate and rhythm, no murmur, click or rub, no peripheral edema  NEURO: Normal strength and tone, mentation intact and speech normal  PSYCH: mentation appears normal, affect normal/bright          Signed Electronically by: Sheyla Sanches, NP     0 = understands/communicates without difficulty

## 2024-12-21 NOTE — PATIENT PROFILE ADULT - NSPROPOAURINARYCATHETER_GEN_A_NUR
"[facility]  Obstetrics  History & Physical    Patient Name: Alena Willis  MRN: [id number]  Admission Date: 1/10/2018  Primary Care Provider: Keena Pillai NP    Subjective:     Principal Problem:<principal problem not specified>    History of Present Illness:  Alena Willis is a 32 y.o. [id number] at 38w1d admitted from [facility] for labor at term.   This IUP is complicated by gMD (diet), asthma, history of anxiety/depression (no meds). She has also been taking antibiotics for a persistent E coli UTI this pregnancy.  Patient reports contractions, denies vaginal bleeding, denies LOF.   Fetal Movement: normal .      Blood Type O POS   GBBS: negative  Rubella: Immune  RPR: NR  HIV: negative  HepB: negative    Surgical history: ovarian cyst removal and cervical tumor removal     Allergic to Kflex and Percocet        Obstetric History       T1      L1     SAB1   TAB0   Ectopic0   Multiple0   Live Births1       # Outcome Date GA Lbr Darrell/2nd Weight Sex Delivery Anes PTL Lv   3 Current            2 SAB            1 Term      Vag-Spont   ALEXSANDER        Past Medical History:   Diagnosis Date    Anxiety     Asthma     Depression     Diet controlled gestational diabetes mellitus (GDM) in third trimester 2017    History of diverticulitis     IBS (irritable bowel syndrome)      Past Surgical History:   Procedure Laterality Date    CERVIX SURGERY      removal of tumor    OVARIAN CYST REMOVAL           (Not in a hospital admission)    Review of patient's allergies indicates:   Allergen Reactions    Keflex [cephalexin] Swelling and Rash    Percocet [oxycodone-acetaminophen] Other (See Comments)     nausea, abdominal cramping - "just doesn't like taking it" - patient took morning of 2016          Family History     Problem Relation (Age of Onset)    Diabetes Paternal Grandfather, Paternal Grandmother, Maternal Grandmother, Maternal Grandfather, Father, Mother, Paternal Aunt, Paternal " Uncle    Hypertension Paternal Grandfather, Paternal Grandmother, Maternal Grandmother, Maternal Grandfather, Father, Mother    Ovarian cancer Mother        Social History Main Topics    Smoking status: Current Every Day Smoker     Packs/day: 0.50     Years: 14.00     Types: Cigarettes    Smokeless tobacco: Never Used    Alcohol use No    Drug use: No    Sexual activity: Not Currently     Birth control/ protection: Rhythm     Review of Systems   Constitutional: Negative for activity change, chills and fatigue.   Eyes: Negative for visual disturbance.   Respiratory: Negative for shortness of breath.    Cardiovascular: Negative for chest pain and palpitations.   Gastrointestinal: Negative for abdominal pain, constipation, diarrhea, nausea and vomiting.   Genitourinary: Negative for dysuria, vaginal bleeding, vaginal discharge, vaginal pain and vaginal odor.   Musculoskeletal: Negative for myalgias.   Skin:  Negative for rash.   Neurological: Negative for headaches.   Psychiatric/Behavioral: Negative for depression.      Objective:     Vital Signs (Most Recent):  Temp: 97.9 °F (36.6 °C) (01/10/18 1939)  Pulse: (!) 111 (01/10/18 1939)  Resp: 16 (01/10/18 1939)  BP: 116/68 (01/10/18 1939)  SpO2: 97 % (01/10/18 1939) Vital Signs (24h Range):  Temp:  [97.9 °F (36.6 °C)] 97.9 °F (36.6 °C)  Pulse:  [111] 111  Resp:  [16] 16  SpO2:  [97 %] 97 %  BP: (116)/(68) 116/68     Weight: 89.8 kg (198 lb)  Body mass index is 30.11 kg/m².    FHT: Cat 1 (reassuring), 120 bpm, moderate BTBV, + accels, - decels  TOCO: Q 2-4 minutes    Physical Exam:   Constitutional: She is oriented to person, place, and time. She appears well-developed and well-nourished.    HENT:   Head: Normocephalic and atraumatic.    Eyes: EOM are normal. Pupils are equal, round, and reactive to light.    Neck: Normal range of motion. Neck supple.    Cardiovascular: Normal rate, regular rhythm and normal heart sounds.     Pulmonary/Chest: Effort normal and breath  sounds normal. No respiratory distress.        Abdominal: Soft. Bowel sounds are normal. She exhibits no distension. There is no tenderness. There is no rebound and no guarding.   Gravid, size appropriate for dates      Genitourinary:   Genitourinary Comments: /-3, intact           Musculoskeletal: Normal range of motion.       Neurological: She is alert and oriented to person, place, and time.    Skin: Skin is warm and dry. No rash noted.    Psychiatric: She has a normal mood and affect. Her behavior is normal. Judgment and thought content normal.     Cervix:  Dilation:  4  Effacement:  75%  Station: -3  Presentation: Vertex     Significant Labs:  Lab Results   Component Value Date    GROUPTRH O POS 2017    HEPBSAG NR 2016    STREPBCULT No Group B Streptococcus isolated 2017     I have personallly reviewed all pertinent lab results from the last 24 hours.    Assessment/Plan:     32 y.o. female  at 38w1d for:    Diet controlled gestational diabetes mellitus (GDM) in third trimester    - diet controlled  -  in NEELIMA on admission        Maternal asthma complicating pregnancy    - no home meds  - duo nebs PRN        Pregnancy, supervision, normal, first/breast/vasectomy, flu    - Admit to Labor and Delivery unit  - Consents for delivery including vaginal or  section and blood transfusion signed and to chart  - Risks, benefits, alternatives and possible complications have been discussed in detail with the patient.   - Epidural per Anesthesia  - Draw CBC, T&S  - Notify Staff  - Recheck in 2 hrs or PRN    Post-Partum Hemorrhage risk - low         Anxiety and depression    - no meds, discuss postpartum antidepressant PRN            Mallory Santillan MD  Obstetrics  [facility]       no

## 2024-12-21 NOTE — ED ADULT NURSE NOTE - OBJECTIVE STATEMENT
Pt. with PMH, presents to ED c/o arm pain and L flank pain.   Assessment:   General: Well appearing, VSS,  Nuro: AOX4, Mood and behavior appropriate to situation.    Skin: clean, dry, wound to R arm, dressing c/d/i  Head & Neck: WDL  CARD:  Denies CP, peripheral pulses equal and palpable, cap refill WDL. No edema noted.   Respiratory: No respiratory distress, unlabored, normal rate and rhythm. Denies SOB, JETT,   GI: Abdomen soft and nondistended. No rebound tenderness or guarding.   : No reports of dysuria, frequency or urgency.   Musculoskeletal: Ambulates without assistance, ROM intact.   Safety: Pt. was instructed to call for assistance before ambulating and avoid sudden movements, Bed in low position with side rails up.

## 2024-12-21 NOTE — ED PROVIDER NOTE - CLINICAL SUMMARY MEDICAL DECISION MAKING FREE TEXT BOX
Patient with recent history of fasciotomy secondary to intraoperative compartment syndrome s/p CABG x4 11/22 with subsequent wash out on 11/27. No fever. Patient's doctor requesting CT to rule out collections of fluid. Will obtain labs, CT. Considered Hand consult however patient has his own surgeon to treat history of compartment syndrome and needs no surgical intervention in this ED. Patient plans on returning to his doctor for continued continuity of care.

## 2024-12-21 NOTE — ED ADULT NURSE NOTE - NS ED NURSE DISCH DISPOSITION
1.  Does the patient have a moderate to severe fever?  []  Yes  [x]  No  2.  Has the patient had a serious reaction to a flu shot before?   []  Yes  [x]  No  3.  Has the patient ever had [place]illian [place] Syndrome with 6 weeks of a previous flu shot?  []  Yes   [x]  No  4.  Does the patient have a serious allergy to eggs?  []  Yes   [x]  No  5.  Does the patient have an allergy to latex?   []  Yes    [x]  No       Note: This applies to [employer] Fluzone pre-filled syringes only  6.  If person is answering for a child, is the child less that 6 months of age? []  Yes  [] No    A \"YES\" answer to any question above means the patient is not eligible to receive the vaccine.    Vaccine Information Statement(s) given and reviewed, questions answered. Patient tolerated without incident.       Vaccine Information Statement(s) for Influenza (Inactivated) given and reviewed, questions answered, verbal consent given by Patient for injection(s) administered.       Admitted

## 2024-12-21 NOTE — ED ADULT TRIAGE NOTE - HEIGHT IN INCHES
Vital signs as charted, afebrile, bradycardic 40's-50's. RLE dressing C/D/I, boot in place. Patient has chronic numbness/tingling from neuropathy, denies pain. Patient slept throughout the night, did not get out of bed this shift. Used urinal throughout the night, voiding without difficulty. Blood glucose at 0315 was 99, declined juice or a snack. Blood glucose with lab draw this morning 104. Lungs clear, bowel sounds active. Per report from previous shift patient has not been out of bed yet. Obtained order from MD for PT.  Alarms on, call light within reach, makes needs known.    9

## 2024-12-21 NOTE — ED ADULT NURSE REASSESSMENT NOTE - NS ED NURSE REASSESS COMMENT FT1
Proactive rounding completed. Patient stable, resting comfortably and no acute distress observed. Patient assessed for pain, position, personal needs and possessions. Fall risk precautions in place. Reinforced education on safety measures including the use of call light. Patient verbalized understanding and willingness to comply. Questions answered, no needs or requests at this time.   ortho/hand resident at the bedside. NPO for or today,

## 2024-12-21 NOTE — PATIENT PROFILE ADULT - FALL HARM RISK - HARM RISK INTERVENTIONS

## 2024-12-21 NOTE — BRIEF OPERATIVE NOTE - NSICDXBRIEFPROCEDURE_GEN_ALL_CORE_FT
PROCEDURES:  Irrigation and debridement, bone, upper extremity 21-Dec-2024 20:28:29 R forearm I&D Eric Momin

## 2024-12-21 NOTE — ED PROVIDER NOTE - PROGRESS NOTE DETAILS
Tawanna Sellers for attending Dr. Zhong  Spoke with Dr Cardoso, he recommended followup with the doctor who did his compartment decompression on 11/22/24 Tawanna Sellers for attending Dr. Zhong  Attempted to call patient's surgeon, family desires to have Hand surgeon from this hospital but was again advised to followup with original surgeon. Tawanna Sellers for attending Dr. Zhong  Family asked to doctor come in to wash RUE wound, discussed I already spoke with Dr Cardoso who advised pt needs to f/u with original surgeon. No s/sx of compartment syndrome. Incision is open and draining serous fluid from distal incisional edge, there is no swelling. Sensation intact, no fever. WBC 14. Will attempt to get in touch with patient's surgeon Dr Flores. Tawanna Sellers for attending Dr. Zhong  Spoke with Dr Miller [facility] surgery on call for Dr Flores, he will discuss case with Dr Flores and will call back with recommendations. Tawanna Sellers for attending Dr. Zhong  Spoke with Dr Miller, wishes to transfer patient to [facility]. #[phone] Hans Crowe DO (Attending): Received signout from Dr. COLLADO, patient was due to be transferred to [facility] for questionable postoperative complication of a right forearm fasciotomy that now is draining purulent drainage for the last 2 days.  Before EMS arrived patient now refusing transfer.  Spoke with on-call hand surgeon Dr. Janae hansen and Ortho resident to see patient. Hans Crowe DO (Attending): Dr. Cardoso to take pt to OR tonight for washout, will admit to medicine.

## 2024-12-21 NOTE — H&P ADULT - NSHPPHYSICALEXAM_GEN_ALL_CORE
T(C): 36.4 (12-21-24 @ 21:00), Max: 36.9 (12-21-24 @ 09:16)  HR: 68 (12-21-24 @ 21:00) (64 - 85)  BP: 132/90 (12-21-24 @ 21:00) (106/68 - 132/90)  RR: 14 (12-21-24 @ 21:00) (11 - 18)  SpO2: 95% (12-21-24 @ 21:00) (95% - 100%)    General: non-toxic  HEENT: non-traumatic, perrla, eomi  Cardio: s1s2 regular rate and rhythm  Lungs: comfortable breathing, clear to auscultation  Abdomen: Soft, non-tender, non-distended  Neuro: AOx4  Ext: Pulses +2

## 2024-12-21 NOTE — ED ADULT TRIAGE NOTE - CHIEF COMPLAINT QUOTE
Patient presents to the ER with complaints of left arm wound infection. Patient states he had complications from an IV during bypass surgery 1 month ago, has pus, redness, and chills. Patient started oral antibiotics two days ago.

## 2024-12-21 NOTE — H&P ADULT - NSICDXPASTMEDICALHX_GEN_ALL_CORE_FT
PAST MEDICAL HISTORY:  Back pain     CAD (coronary artery disease)     Open wound of right forearm

## 2024-12-21 NOTE — ED PROVIDER NOTE - PHYSICAL EXAMINATION
Gen:  Well appearing in NAD  Head:  NC/AT  HEENT: pupils perrl,no pharyngeal erythema, uvula midline  Cardiac: S1S2, RRR, 2+ radial pulses, MRU intact  Abd: Soft, non tender  Resp: No distress, CTA   musculoskeletal:: no deformities, no swelling, strength +5/+5  Skin: warm and dry as visualized, R forearm wound opened distally with serous-purulent drainage with yellow granulation tissue, incisional edge approximated,    Neuro: GARCIA, cn2-12, aao x 4, sensation decreased at finger tips  Psych:alert, cooperative, appropriate mood and affect for situation Gen:  Well appearing in NAD  Head:  NC/AT  HEENT: pupils perrl, no pharyngeal erythema, uvula midline  Cardiac: S1S2, RRR, 2+ radial pulses, MRU intact  Abd: Soft, non tender  Resp: No distress, CTA   musculoskeletal:: no deformities, no swelling, strength +5/+5  Skin: warm and dry as visualized, R forearm wound opened distally with serous drainage with yellow granulation tissue, incisional edge approximated, sensation intact  Neuro: GARCIA, cn2-12, aao x 4  Psych: alert, cooperative, appropriate mood and affect for situation Gen:  Well appearing in NAD  Head:  NC/AT  HEENT: pupils perrl, no pharyngeal erythema, uvula midline  Cardiac: S1S2, RRR, 2+ radial pulses, MRU intact  Abd: Soft, non tender  Resp: No distress, CTA   musculoskeletal:: no deformities, no swelling, strength +5/+5  Skin: R forearm wound opened distally with serous-purulent drainage and yellow granulation tissue, incisional edge approximated, sensation intact  Neuro: GARCIA, cn2-12, aao x 4  Psych: alert, cooperative, appropriate mood and affect for situation

## 2024-12-21 NOTE — CONSULT NOTE ADULT - SUBJECTIVE AND OBJECTIVE BOX
HPI  49yMale c/o R forearm erythema and purulent drainage. Pt is s/p quadruple bypass with R radial artery harvest complicated by compartment syndrome requiring same day RTOR for fasciotomy on 11/22/24. The fasciotomy was closed on 11/27/24. Pt noticed erythema at the incision site 3 days ago and was placed on Augmentin by his surgeon Dr. Talat Flores. Yesterday he noticed purulent discharge from the incision so he came to ED for further eval. Denies fevers/chills. Denies numbness/tingling in the RUE. Denies any IV drug use. Pt last ate 0800 and denies AC use.    ROS  Negative unless otherwise specified in HPI.    PAST MEDICAL & SURGICAL Hx  PAST MEDICAL & SURGICAL HISTORY:  No pertinent past medical history      Back pain          MEDICATIONS  Home Medications:      ALLERGIES  No Known Allergies      FAMILY Hx  FAMILY HISTORY:      SOCIAL Hx  Social History:      VITALS  Vital Signs Last 24 Hrs  T(C): 36.6 (21 Dec 2024 15:48), Max: 36.9 (21 Dec 2024 09:16)  T(F): 97.9 (21 Dec 2024 15:48), Max: 98.4 (21 Dec 2024 09:16)  HR: 64 (21 Dec 2024 15:48) (64 - 85)  BP: 106/68 (21 Dec 2024 15:48) (106/68 - 117/76)  BP(mean): 79 (21 Dec 2024 15:48) (75 - 89)  RR: 16 (21 Dec 2024 15:48) (15 - 18)  SpO2: 98% (21 Dec 2024 15:48) (97% - 99%)    Parameters below as of 21 Dec 2024 15:48  Patient On (Oxygen Delivery Method): room air        PHYSICAL EXAM  Gen: Lying in bed, non-toxic appearing, NAD  Resp: No increased WOB  RUE:  volar forearm incision with midline opening and purulent drainage  +TTP over incision, warmer to touch relative to L side; compartments soft  painless finger, hand, wrist, elbow ROM  Motor: Musc/Med/Rad/AIN/PIN/U intact  Sensory: Musc/Med/Rad/U SILT  +Rad pulse, WWP    LABS                        11.5   13.40 )-----------( 415      ( 21 Dec 2024 10:28 )             36.0     12-21    134[L]  |  106  |  19  ----------------------------<  156[H]  3.9   |  25  |  1.30    Ca    9.1      21 Dec 2024 10:28    TPro  8.2  /  Alb  3.4  /  TBili  0.4  /  DBili  x   /  AST  22  /  ALT  51  /  AlkPhos  119  12-21      ESR: 43  CRP: --  12-21 @ 10:28        IMAGING  CT R forearm:   IMPRESSION:    Small volume neck mixed subcutaneous and intermuscular abscess along the   lateral margin of right proximal to mid forearm with deep margin   extending between the flexor and extensor compartments and abutting the   radial vascular bundle. No tracking soft tissue emphysema.        ASSESSMENT & PLAN  49yMale w/ R forearm infection s/p fasciotomy on 11/22/24    -plan for OR for I&D tonight with Dr. Cardoso  -NPO and hold chemical DVT ppx  -admit to medicine  -pain control  -WBAT RUE  -abx per primary/ID (received vancomycin and cefepime in ED)  -ID consult  -f/u BCx  -discussed with Dr. Cardoso who agrees with plan HPI  49yMale c/o R forearm erythema and purulent drainage. Pt is s/p quadruple bypass with L radial artery harvest complicated by compartment syndrome from R IV inflitration requiring same day RTOR for R forearm fasciotomy on 11/22/24. The fasciotomy was closed on 11/27/24. Pt noticed erythema at the incision site 3 days ago and was placed on Augmentin by his surgeon Dr. Talat Flores. Yesterday he noticed purulent discharge from the incision so he came to ED for further eval. Denies fevers/chills. Denies numbness/tingling in the RUE. Denies any IV drug use. Pt last ate 0800 and denies AC use.    ROS  Negative unless otherwise specified in HPI.    PAST MEDICAL & SURGICAL Hx  PAST MEDICAL & SURGICAL HISTORY:  No pertinent past medical history      Back pain          MEDICATIONS  Home Medications:      ALLERGIES  No Known Allergies      FAMILY Hx  FAMILY HISTORY:      SOCIAL Hx  Social History:      VITALS  Vital Signs Last 24 Hrs  T(C): 36.6 (21 Dec 2024 15:48), Max: 36.9 (21 Dec 2024 09:16)  T(F): 97.9 (21 Dec 2024 15:48), Max: 98.4 (21 Dec 2024 09:16)  HR: 64 (21 Dec 2024 15:48) (64 - 85)  BP: 106/68 (21 Dec 2024 15:48) (106/68 - 117/76)  BP(mean): 79 (21 Dec 2024 15:48) (75 - 89)  RR: 16 (21 Dec 2024 15:48) (15 - 18)  SpO2: 98% (21 Dec 2024 15:48) (97% - 99%)    Parameters below as of 21 Dec 2024 15:48  Patient On (Oxygen Delivery Method): room air        PHYSICAL EXAM  Gen: Lying in bed, non-toxic appearing, NAD  Resp: No increased WOB  RUE:  volar forearm incision with midline opening and purulent drainage  +TTP over incision, warmer to touch relative to L side; compartments soft  painless finger, hand, wrist, elbow ROM  Motor: Musc/Med/Rad/AIN/PIN/U intact  Sensory: Musc/Med/Rad/U SILT  +Rad pulse, WWP    LUE:  incisions CDI  Motor: Musc/Med/Rad/AIN/PIN/U intact  Sensory: Musc/Med/Rad/U SILT  fingers wwp    LABS                        11.5   13.40 )-----------( 415      ( 21 Dec 2024 10:28 )             36.0     12-21    134[L]  |  106  |  19  ----------------------------<  156[H]  3.9   |  25  |  1.30    Ca    9.1      21 Dec 2024 10:28    TPro  8.2  /  Alb  3.4  /  TBili  0.4  /  DBili  x   /  AST  22  /  ALT  51  /  AlkPhos  119  12-21      ESR: 43  CRP: --  12-21 @ 10:28        IMAGING  CT R forearm:   IMPRESSION:    Small volume neck mixed subcutaneous and intermuscular abscess along the   lateral margin of right proximal to mid forearm with deep margin   extending between the flexor and extensor compartments and abutting the   radial vascular bundle. No tracking soft tissue emphysema.        ASSESSMENT & PLAN  49yMale w/ R forearm infection s/p fasciotomy on 11/22/24    -plan for OR for I&D tonight with Dr. Cardoso  -NPO and hold chemical DVT ppx  -admit to medicine  -pain control  -WBAT RUE  -abx per primary/ID (received vancomycin and cefepime in ED)  -ID consult  -f/u BCx  -discussed with Dr. Cardoso who agrees with plan

## 2024-12-22 PROBLEM — Z78.9 OTHER SPECIFIED HEALTH STATUS: Chronic | Status: INACTIVE | Noted: 2023-06-03 | Resolved: 2024-12-21

## 2024-12-22 LAB
ALBUMIN SERPL ELPH-MCNC: 2.9 G/DL — LOW (ref 3.3–5)
ALP SERPL-CCNC: 108 U/L — SIGNIFICANT CHANGE UP (ref 40–120)
ALT FLD-CCNC: 47 U/L — SIGNIFICANT CHANGE UP (ref 12–78)
ANION GAP SERPL CALC-SCNC: 6 MMOL/L — SIGNIFICANT CHANGE UP (ref 5–17)
AST SERPL-CCNC: 19 U/L — SIGNIFICANT CHANGE UP (ref 15–37)
BASOPHILS # BLD AUTO: 0.03 K/UL — SIGNIFICANT CHANGE UP (ref 0–0.2)
BASOPHILS NFR BLD AUTO: 0.2 % — SIGNIFICANT CHANGE UP (ref 0–2)
BILIRUB SERPL-MCNC: 0.3 MG/DL — SIGNIFICANT CHANGE UP (ref 0.2–1.2)
BUN SERPL-MCNC: 20 MG/DL — SIGNIFICANT CHANGE UP (ref 7–23)
CALCIUM SERPL-MCNC: 9.2 MG/DL — SIGNIFICANT CHANGE UP (ref 8.5–10.1)
CHLORIDE SERPL-SCNC: 106 MMOL/L — SIGNIFICANT CHANGE UP (ref 96–108)
CO2 SERPL-SCNC: 24 MMOL/L — SIGNIFICANT CHANGE UP (ref 22–31)
CREAT SERPL-MCNC: 1.29 MG/DL — SIGNIFICANT CHANGE UP (ref 0.5–1.3)
EGFR: 68 ML/MIN/1.73M2 — SIGNIFICANT CHANGE UP
EOSINOPHIL # BLD AUTO: 0 K/UL — SIGNIFICANT CHANGE UP (ref 0–0.5)
EOSINOPHIL NFR BLD AUTO: 0 % — SIGNIFICANT CHANGE UP (ref 0–6)
GLUCOSE BLDC GLUCOMTR-MCNC: 160 MG/DL — HIGH (ref 70–99)
GLUCOSE SERPL-MCNC: 155 MG/DL — HIGH (ref 70–99)
HCT VFR BLD CALC: 34.5 % — LOW (ref 39–50)
HGB BLD-MCNC: 10.9 G/DL — LOW (ref 13–17)
IMM GRANULOCYTES NFR BLD AUTO: 0.9 % — SIGNIFICANT CHANGE UP (ref 0–0.9)
LYMPHOCYTES # BLD AUTO: 1.08 K/UL — SIGNIFICANT CHANGE UP (ref 1–3.3)
LYMPHOCYTES # BLD AUTO: 8.2 % — LOW (ref 13–44)
MAGNESIUM SERPL-MCNC: 2.1 MG/DL — SIGNIFICANT CHANGE UP (ref 1.6–2.6)
MCHC RBC-ENTMCNC: 28.2 PG — SIGNIFICANT CHANGE UP (ref 27–34)
MCHC RBC-ENTMCNC: 31.6 G/DL — LOW (ref 32–36)
MCV RBC AUTO: 89.4 FL — SIGNIFICANT CHANGE UP (ref 80–100)
MONOCYTES # BLD AUTO: 0.36 K/UL — SIGNIFICANT CHANGE UP (ref 0–0.9)
MONOCYTES NFR BLD AUTO: 2.7 % — SIGNIFICANT CHANGE UP (ref 2–14)
NEUTROPHILS # BLD AUTO: 11.58 K/UL — HIGH (ref 1.8–7.4)
NEUTROPHILS NFR BLD AUTO: 88 % — HIGH (ref 43–77)
PHOSPHATE SERPL-MCNC: 3.3 MG/DL — SIGNIFICANT CHANGE UP (ref 2.5–4.5)
PLATELET # BLD AUTO: 405 K/UL — HIGH (ref 150–400)
POTASSIUM SERPL-MCNC: 4.7 MMOL/L — SIGNIFICANT CHANGE UP (ref 3.5–5.3)
POTASSIUM SERPL-SCNC: 4.7 MMOL/L — SIGNIFICANT CHANGE UP (ref 3.5–5.3)
PROT SERPL-MCNC: 7.6 GM/DL — SIGNIFICANT CHANGE UP (ref 6–8.3)
RBC # BLD: 3.86 M/UL — LOW (ref 4.2–5.8)
RBC # FLD: 13.6 % — SIGNIFICANT CHANGE UP (ref 10.3–14.5)
SODIUM SERPL-SCNC: 136 MMOL/L — SIGNIFICANT CHANGE UP (ref 135–145)
WBC # BLD: 13.17 K/UL — HIGH (ref 3.8–10.5)
WBC # FLD AUTO: 13.17 K/UL — HIGH (ref 3.8–10.5)

## 2024-12-22 PROCEDURE — 99233 SBSQ HOSP IP/OBS HIGH 50: CPT

## 2024-12-22 RX ORDER — CEFTRIAXONE SODIUM 1 G/1
2000 INJECTION, POWDER, FOR SOLUTION INTRAMUSCULAR; INTRAVENOUS EVERY 24 HOURS
Refills: 0 | Status: DISCONTINUED | OUTPATIENT
Start: 2024-12-22 | End: 2024-12-24

## 2024-12-22 RX ORDER — HEPARIN SODIUM 1000 [USP'U]/ML
5000 INJECTION, SOLUTION INTRAVENOUS; SUBCUTANEOUS EVERY 8 HOURS
Refills: 0 | Status: DISCONTINUED | OUTPATIENT
Start: 2024-12-22 | End: 2024-12-24

## 2024-12-22 RX ORDER — CEFTRIAXONE SODIUM 1 G/1
2000 INJECTION, POWDER, FOR SOLUTION INTRAMUSCULAR; INTRAVENOUS EVERY 24 HOURS
Refills: 0 | Status: DISCONTINUED | OUTPATIENT
Start: 2024-12-22 | End: 2024-12-22

## 2024-12-22 RX ORDER — AMIODARONE HYDROCHLORIDE 200 MG/1
200 TABLET ORAL DAILY
Refills: 0 | Status: DISCONTINUED | OUTPATIENT
Start: 2024-12-22 | End: 2024-12-24

## 2024-12-22 RX ORDER — DOXYCYCLINE MONOHYDRATE 100 MG
100 TABLET ORAL EVERY 12 HOURS
Refills: 0 | Status: DISCONTINUED | OUTPATIENT
Start: 2024-12-22 | End: 2024-12-24

## 2024-12-22 RX ADMIN — HEPARIN SODIUM 5000 UNIT(S): 1000 INJECTION, SOLUTION INTRAVENOUS; SUBCUTANEOUS at 21:18

## 2024-12-22 RX ADMIN — Medication 81 MILLIGRAM(S): at 09:54

## 2024-12-22 RX ADMIN — Medication 2000 MILLIGRAM(S): at 05:36

## 2024-12-22 RX ADMIN — Medication 2: at 08:08

## 2024-12-22 RX ADMIN — CEFTRIAXONE SODIUM 2000 MILLIGRAM(S): 1 INJECTION, POWDER, FOR SOLUTION INTRAMUSCULAR; INTRAVENOUS at 09:55

## 2024-12-22 RX ADMIN — Medication 5 MILLIGRAM(S): at 22:23

## 2024-12-22 RX ADMIN — ACETAMINOPHEN 1000 MILLIGRAM(S): 80 SOLUTION/ DROPS ORAL at 21:19

## 2024-12-22 RX ADMIN — SODIUM CHLORIDE 100 MILLILITER(S): 9 INJECTION, SOLUTION INTRAVENOUS at 10:16

## 2024-12-22 RX ADMIN — ACETAMINOPHEN 400 MILLIGRAM(S): 80 SOLUTION/ DROPS ORAL at 05:36

## 2024-12-22 RX ADMIN — Medication 110 MILLIGRAM(S): at 21:18

## 2024-12-22 RX ADMIN — Medication 17 GRAM(S): at 09:55

## 2024-12-22 RX ADMIN — SENNOSIDES 2 TABLET(S): 8.6 TABLET, FILM COATED ORAL at 21:22

## 2024-12-22 RX ADMIN — METRONIDAZOLE 100 MILLIGRAM(S): 250 TABLET ORAL at 06:07

## 2024-12-22 RX ADMIN — Medication 5 MILLIGRAM(S): at 18:00

## 2024-12-22 RX ADMIN — Medication 5 MILLIGRAM(S): at 17:10

## 2024-12-22 RX ADMIN — ACETAMINOPHEN 1000 MILLIGRAM(S): 80 SOLUTION/ DROPS ORAL at 05:36

## 2024-12-22 RX ADMIN — Medication 25 MILLIGRAM(S): at 21:19

## 2024-12-22 RX ADMIN — ROSUVASTATIN 40 MILLIGRAM(S): 40 TABLET, FILM COATED ORAL at 21:19

## 2024-12-22 RX ADMIN — Medication 5 MILLIGRAM(S): at 21:53

## 2024-12-22 RX ADMIN — ACETAMINOPHEN 1000 MILLIGRAM(S): 80 SOLUTION/ DROPS ORAL at 13:04

## 2024-12-22 RX ADMIN — ACETAMINOPHEN 1000 MILLIGRAM(S): 80 SOLUTION/ DROPS ORAL at 13:07

## 2024-12-22 RX ADMIN — Medication 110 MILLIGRAM(S): at 09:55

## 2024-12-22 NOTE — PROGRESS NOTE ADULT - SUBJECTIVE AND OBJECTIVE BOX
Patient seen and examined at bedside. No acute complaints at this time. Pain well controlled. Denies chest pain, shortness of breath, nausea or vomiting.       Vital Signs (24 Hrs):  T(C): 36.6 (12-22-24 @ 04:00), Max: 36.9 (12-21-24 @ 09:16)  HR: 89 (12-22-24 @ 04:00) (64 - 89)  BP: 109/66 (12-22-24 @ 04:00) (106/68 - 132/90)  RR: 18 (12-22-24 @ 04:00) (11 - 18)  SpO2: 96% (12-22-24 @ 04:00) (95% - 100%)  Wt(kg): --    LABS:                          11.5   13.40 )-----------( 415      ( 21 Dec 2024 10:28 )             36.0     12-21    134[L]  |  106  |  19  ----------------------------<  156[H]  3.9   |  25  |  1.30    Ca    9.1      21 Dec 2024 10:28    TPro  8.2  /  Alb  3.4  /  TBili  0.4  /  DBili  x   /  AST  22  /  ALT  51  /  AlkPhos  119  12-21    LIVER FUNCTIONS - ( 21 Dec 2024 10:28 )  Alb: 3.4 g/dL / Pro: 8.2 gm/dL / ALK PHOS: 119 U/L / ALT: 51 U/L / AST: 22 U/L / GGT: x           PT/INR - ( 21 Dec 2024 19:00 )   PT: 12.6 sec;   INR: 1.10 ratio         PTT - ( 21 Dec 2024 19:00 )  PTT:35.2 sec    PE:    General: NAD, resting comfortably in bed  RUE:   Dressing C/D/I  Compartments soft and compressible  +Axillary/Musculocutaneous/Radial/Median/AIN/PIN intact  SILT med/rad/uln/labc/ax  fingers wwp      A/P:  49y M s/p R forearm I&D POD 1    -ortho to advance packing daily, to be fully removed POD 2  -WBAT RUE  -Pain Control  -DVT ppx per primary  -Continue abx, ID consult  -FU AM Labs, trend CRP q48  -FU OR cx, BCx  -Rest, ice, compress and elevate the extremity as needed  -Incentive Spirometry  -Medical comanagement appreciated  -dispo pending medical course  -will discuss with Dr. Cardoso and advise with changes to plan

## 2024-12-22 NOTE — DISCHARGE NOTE PROVIDER - NSDCCPCAREPLAN_GEN_ALL_CORE_FT
PRINCIPAL DISCHARGE DIAGNOSIS  Diagnosis: Abscess of right arm  Assessment and Plan of Treatment:      PRINCIPAL DISCHARGE DIAGNOSIS  Diagnosis: Abscess of right arm  Assessment and Plan of Treatment: #Right upper extremity cellulitis, abscess  – Status post I&D with Ortho 12/21  - Packing DC'd 12/23.   -  Recommend Home wound care to change dry dressings   -  Rest, ice, compress and elevate the extremity as needed  -  FU Outpatient with Dr. Cardoso on 12/27  -  cultures show MSSA - patient to be discharged on ceftin 500 BID X 14 day with probiotic  -if worsening swelling, pain or redness of the arm retuenr to the ER        SECONDARY DISCHARGE DIAGNOSES  Diagnosis: NSTEMI (non-ST elevation myocardial infarction)  Assessment and Plan of Treatment: #multivessel CAD status post CABG x 4 (LIMA-LAD, Radial-OM, SVG-OM, LADI-RCA), at [facility], November 2024   – Continue with aspirin 81 mg  – Continue with atorvastatin 40 mg  – Continue with metoprolol succinate 25 mg once daily  #Postoperative atrial fibrillation  – Resume oral amiodarone 200 mg once daily        PRINCIPAL DISCHARGE DIAGNOSIS  Diagnosis: Abscess of right arm  Assessment and Plan of Treatment: #Right upper extremity cellulitis, abscess  – Status post I&D with Ortho 12/21  - Packing DC'd 12/23.   -  Recommend Home wound care to change dry dressings   -  Rest, ice, compress and elevate the extremity as needed  -  FU Outpatient with Dr. Cardoso on 12/27  -  cultures show MSSA - patient to be discharged on ceftin 500 BID X 14 day with probiotic  -if worsening swelling, pain or redness of the arm return to the ER        SECONDARY DISCHARGE DIAGNOSES  Diagnosis: NSTEMI (non-ST elevation myocardial infarction)  Assessment and Plan of Treatment: #multivessel CAD status post CABG x 4 (LIMA-LAD, Radial-OM, SVG-OM, LADI-RCA), at [facility], November 2024   – Continue with aspirin 81 mg  – Continue with atorvastatin 40 mg  – Continue with metoprolol succinate 25 mg once daily  #Postoperative atrial fibrillation  – Resume oral amiodarone 200 mg once daily

## 2024-12-22 NOTE — PROGRESS NOTE ADULT - TIME BILLING
Time spent  coordinating the patient's care. This includes reviewing documentation pertinent to this admission, results and imaging. Further tests, medications, and procedures have been ordered as indicated. Laboratory results and the plan of care were communicated to the patient. Discussed care plan with consultants including . Supporting documentation was completed and added to the patient's chart.

## 2024-12-22 NOTE — DISCHARGE NOTE PROVIDER - NSDCFUADDINST_GEN_ALL_CORE_FT
1.	Analgesia, pain control as needed  2.	Weight bearing as tolerated on right upper extremity  3.	Continue antibiotics per infectious disease instructions  4.	Keep dressing clean and dry, change with gauze and ace wrap if saturated  5.	Follow up with Orthopedic Surgeon Dr. Cardoso in 7-10 Days after Discharge from the Hospital. Call Office For Appointment.  6.	Sutures to be removed Post-Op Day 14  7.	Elevate the extremity as much as possible

## 2024-12-22 NOTE — DISCHARGE NOTE PROVIDER - NSDCMRMEDTOKEN_GEN_ALL_CORE_FT
acetaminophen 500 mg oral tablet: 2 tab(s) orally every 6 hours  amiodarone 200 mg oral tablet: 1 tab(s) orally once a day Please take 2 tabs every 12 hours for 2 days and then take 1 tab every 24 hours there after.  amLODIPine 2.5 mg oral tablet: 1 tab(s) orally once a day  amoxicillin-clavulanate 875 mg-125 mg oral tablet: 875 milligram(s) orally 2 times a day  aspirin 81 mg oral delayed release tablet: 1 tab(s) orally once a day  atorvastatin 40 mg oral tablet: 1 tab(s) orally once a day (at bedtime)  furosemide 40 mg oral tablet: 1 tab(s) orally once a day  metoprolol tartrate 50 mg oral tablet: 1 tab(s) orally 2 times a day  oxyCODONE 5 mg oral tablet: 1 tab(s) orally every 4 hours as needed for Moderate Pain (4 - 6) MDD: 6  pantoprazole 40 mg oral delayed release tablet: 1 tab(s) orally once a day (before a meal)  polyethylene glycol 3350 oral powder for reconstitution: 17 gram(s) orally once a day  Potassium Chloride (Eqv-Klor-Con M20) 20 mEq oral tablet, extended release: 1 tab(s) orally once a day  senna leaf extract oral tablet: 2 tab(s) orally once a day (at bedtime)   amiodarone 200 mg oral tablet: 1 tab(s) orally once a day  amLODIPine 2.5 mg oral tablet: 1 tab(s) orally once a day  aspirin 81 mg oral delayed release tablet: 1 tab(s) orally once a day  atorvastatin 40 mg oral tablet: 1 tab(s) orally once a day (at bedtime)  cefuroxime 500 mg oral tablet: 1 tab(s) orally 2 times a day  lactobacillus acidophilus oral tablet: 2 tab(s) orally 2 times a day  metoprolol tartrate 50 mg oral tablet: 1 tab(s) orally 2 times a day  oxyCODONE 5 mg oral tablet: 1 tab(s) orally every 6 hours as needed for Severe Pain (7 - 10) MDD: 4 tab  pantoprazole 40 mg oral delayed release tablet: 1 tab(s) orally once a day (before a meal)

## 2024-12-22 NOTE — DISCHARGE NOTE PROVIDER - HOSPITAL COURSE
HOSPITALIST PROGRESS NOTE:  SUBJECTIVE:  PCP:  Chief Complaint: Patient is a 49y old  Male who presents with a chief complaint of right arm abscess, cellulitis (22 Dec 2024 11:19)      HPI:  49M w/ PMHx of smoking, presents to ED for wound check. Patient admitted at [facility] for ACS, cardiac cath showed 3V occlusion  and patient was transferred to [facility] for eval of CABG. Patient had R radial artery harvest and quadruple bypass on 11/22/24. Procedure complicated by RUE compartment syndrome, hematoma in RUE 2/2 R radial arterial line requiring intraop vascular consult and fasciotomy. On 11/27/24 RUE forearm wound washed out with hydrogen peroxide and closed in layers. Patient noted after the recent procedure he had no issues until 2-3 days ago opened up and had purulent drainage with erythema and warmth to area. In ED patient had CT noted for right forearm abscess, see full imaging results below. Patient underwent I&D by orthopaedics (12/21/24), Admitted to  for further care.     (21 Dec 2024 21:07)    12/23:  Above reviewed; patient wants to go home but sensitivty not back; Patient has no other complaints;   12/24:  no overnight event; seen by id and cleared for D/C  HOSPITALIST PROGRESS NOTE:  SUBJECTIVE:  PCP:  Chief Complaint: Patient is a 49y old  Male who presents with a chief complaint of right arm abscess, cellulitis (22 Dec 2024 11:19)      HPI:  49M w/ PMHx of smoking, presents to ED for wound check. Patient admitted at [facility] for ACS, cardiac cath showed 3V occlusion  and patient was transferred to [facility] for eval of CABG. Patient had R radial artery harvest and quadruple bypass on 11/22/24. Procedure complicated by RUE compartment syndrome, hematoma in RUE 2/2 R radial arterial line requiring intraop vascular consult and fasciotomy. On 11/27/24 RUE forearm wound washed out with hydrogen peroxide and closed in layers. Patient noted after the recent procedure he had no issues until 2-3 days ago opened up and had purulent drainage with erythema and warmth to area. In ED patient had CT noted for right forearm abscess, see full imaging results below. Patient underwent I&D by orthopaedics (12/21/24), Admitted to  for further care.     (21 Dec 2024 21:07)    12/23:  Above reviewed; patient wants to go home but sensitivty not back; Patient has no other complaints;   12/24:  no overnight event; seen by id and cleared for D/C    49M w/ PMHx of smoking, CABG s/p R radial artery harvest and quadruple bypass on 11/22/24 complicated by  RUE compartment syndrome, hematoma in RUE 2/2 R radial arterial line requiring intraop vascular consult and fasciotomy presents with worsening drainage and erythema RUE concerning for cellulitis with abscess seen on imaging. S/p I&D 12/21    #Right upper extremity cellulitis, abscess  – Status post I&D with Ortho 12/21  – Status post cefazolin, Flagyl, and vancomycin on admission  – Appreciate ID recommendations, will continue with empiric ceftriaxone 2 g once daily, doxycycline 100 mg every 12 hours   – Blood cultures 12/21 X2 pending, wound culture from I&D pending  - Packing DC'd 12/23.   -  Recommend Home wound care to change dry dressings   -  Rest, ice, compress and elevate the extremity as needed  -  FU Outpatient with Dr. Cardoso on 12/27  -  -Discussed with Dr Swann it is MSSA - patient to be discharged on ceftin 500 BIG X 14 day     #NSTEMI  #multivessel CAD status post CABG x 4 (LIMA-LAD, Radial-OM, SVG-OM, LADI-RCA), at [facility], November 2024   – Continue with aspirin 81 mg  – Continue with atorvastatin 40 mg  – Per review of outside records, metoprolol recently decreased given fatigue  – Continue with metoprolol succinate 25 mg once daily    #Postoperative atrial fibrillation  – Appears patient was on oral amiodarone 200 mg once daily but this was not continued on admission   – Resume oral amiodarone 200 mg once daily     #HTN   – c/w home norvasc 2.5 mg once daily     #Normocytic anemia  – Per outpatient records, patient was to follow-up as an outpatient with hematology for possible iron infusions  – Follow-up iron studies in a.m.    DVT PPx: Hep SQ    Dispo - D/C home     total time 80 minutes HOSPITALIST PROGRESS NOTE:  SUBJECTIVE:  PCP:  Chief Complaint: Patient is a 49y old  Male who presents with a chief complaint of right arm abscess, cellulitis (22 Dec 2024 11:19)      HPI:  49M w/ PMHx of smoking, presents to ED for wound check. Patient admitted at [facility] for ACS, cardiac cath showed 3V occlusion  and patient was transferred to [facility] for eval of CABG. Patient had R radial artery harvest and quadruple bypass on 11/22/24. Procedure complicated by RUE compartment syndrome, hematoma in RUE 2/2 R radial arterial line requiring intraop vascular consult and fasciotomy. On 11/27/24 RUE forearm wound washed out with hydrogen peroxide and closed in layers. Patient noted after the recent procedure he had no issues until 2-3 days ago opened up and had purulent drainage with erythema and warmth to area. In ED patient had CT noted for right forearm abscess, see full imaging results below. Patient underwent I&D by orthopaedics (12/21/24), Admitted to  for further care.     (21 Dec 2024 21:07)    12/23:  Above reviewed; patient wants to go home but sensitivty not back; Patient has no other complaints;   12/24:  no overnight event; seen by id and cleared for D/C  HOSPITALIST PROGRESS NOTE:  SUBJECTIVE:  PCP:  Chief Complaint: Patient is a 49y old  Male who presents with a chief complaint of right arm abscess, cellulitis (22 Dec 2024 11:19)      HPI:  49M w/ PMHx of smoking, presents to ED for wound check. Patient admitted at [facility] for ACS, cardiac cath showed 3V occlusion  and patient was transferred to [facility] for eval of CABG. Patient had R radial artery harvest and quadruple bypass on 11/22/24. Procedure complicated by RUE compartment syndrome, hematoma in RUE 2/2 R radial arterial line requiring intraop vascular consult and fasciotomy. On 11/27/24 RUE forearm wound washed out with hydrogen peroxide and closed in layers. Patient noted after the recent procedure he had no issues until 2-3 days ago opened up and had purulent drainage with erythema and warmth to area. In ED patient had CT noted for right forearm abscess, see full imaging results below. Patient underwent I&D by orthopaedics (12/21/24), Admitted to  for further care.     (21 Dec 2024 21:07)    12/23:  Above reviewed; patient wants to go home but sensitivty not back; Patient has no other complaints;   12/24:  no overnight event; seen by id and cleared for D/C    49M w/ PMHx of smoking, CABG s/p R radial artery harvest and quadruple bypass on 11/22/24 complicated by  RUE compartment syndrome, hematoma in RUE 2/2 R radial arterial line requiring intraop vascular consult and fasciotomy presents with worsening drainage and erythema RUE concerning for cellulitis with abscess seen on imaging. S/p I&D 12/21    #Right upper extremity cellulitis, abscess  – Status post I&D with Ortho 12/21  – Status post cefazolin, Flagyl, and vancomycin on admission  – Appreciate ID recommendations, will continue with empiric ceftriaxone 2 g once daily, doxycycline 100 mg every 12 hours   – Blood cultures 12/21 X2 pending, wound culture from I&D pending  - Packing DC'd 12/23.   -  Recommend Home wound care to change dry dressings   -  Rest, ice, compress and elevate the extremity as needed  -  FU Outpatient with Dr. Cardoso on 12/27  -  -Discussed with Dr Swann it is MSSA - patient to be discharged on ceftin 500 BID X 14 day     #NSTEMI  #multivessel CAD status post CABG x 4 (LIMA-LAD, Radial-OM, SVG-OM, LADI-RCA), at [facility], November 2024   – Continue with aspirin 81 mg  – Continue with atorvastatin 40 mg  – Per review of outside records, metoprolol recently decreased given fatigue  – Continue with metoprolol succinate 25 mg once daily    #Postoperative atrial fibrillation  – Appears patient was on oral amiodarone 200 mg once daily but this was not continued on admission   – Resume oral amiodarone 200 mg once daily     #HTN   – c/w home norvasc 2.5 mg once daily     #Normocytic anemia  – Per outpatient records, patient was to follow-up as an outpatient with hematology for possible iron infusions  – Follow-up iron studies in a.m.    DVT PPx: Hep SQ    Dispo - D/C home     total time 80 minutes

## 2024-12-22 NOTE — DISCHARGE NOTE PROVIDER - NSDCCPTREATMENT_GEN_ALL_CORE_FT
PRINCIPAL PROCEDURE  Procedure: Irrigation and debridement, bone, upper extremity  Findings and Treatment: R forearm I&D

## 2024-12-22 NOTE — DISCHARGE NOTE PROVIDER - NSDCFUSCHEDAPPT_GEN_ALL_CORE_FT
AMANDA Dyson  [facility]  [facility] [address]  Scheduled Appointment: 01/13/2025    
intact

## 2024-12-22 NOTE — PROGRESS NOTE ADULT - SUBJECTIVE AND OBJECTIVE BOX
HOSPITALIST ATTENDING PROGRESS NOTE    Chart and meds reviewed.  Patient seen and examined.    Subjective:  Patient s/p irrigation and debridement with orthopedic service 12/21, presently states pain is well-controlled.  Denies fevers, chills, chest pain, shortness of breath.  Orthopedic with plan to remove wound packing postoperative day 2    All other systems reviewed and found to be negative with the exception of what has been described above.    MEDICATIONS  (STANDING):  acetaminophen     Tablet .. 1000 milliGRAM(s) Oral every 8 hours  aspirin enteric coated 81 milliGRAM(s) Oral daily  cefTRIAXone Injectable. 2000 milliGRAM(s) IV Push every 24 hours  dextrose 5%. 1000 milliLiter(s) (50 mL/Hr) IV Continuous <Continuous>  dextrose 5%. 1000 milliLiter(s) (100 mL/Hr) IV Continuous <Continuous>  dextrose 50% Injectable 25 Gram(s) IV Push once  dextrose 50% Injectable 12.5 Gram(s) IV Push once  dextrose 50% Injectable 25 Gram(s) IV Push once  doxycycline IVPB 100 milliGRAM(s) IV Intermittent every 12 hours  glucagon  Injectable 1 milliGRAM(s) IntraMuscular once  influenza   Vaccine 0.5 milliLiter(s) IntraMuscular once  lactated ringers. 1000 milliLiter(s) (100 mL/Hr) IV Continuous <Continuous>  metoprolol succinate ER 25 milliGRAM(s) Oral daily  naloxone Injectable 0.4 milliGRAM(s) IV Push once  pantoprazole    Tablet 40 milliGRAM(s) Oral before breakfast  polyethylene glycol 3350 17 Gram(s) Oral daily  rosuvastatin 40 milliGRAM(s) Oral at bedtime  senna 2 Tablet(s) Oral at bedtime    MEDICATIONS  (PRN):  aluminum hydroxide/magnesium hydroxide/simethicone Suspension 30 milliLiter(s) Oral every 4 hours PRN Dyspepsia  bisacodyl 5 milliGRAM(s) Oral daily PRN Constipation  dextrose Oral Gel 15 Gram(s) Oral once PRN Blood Glucose LESS THAN 70 milliGRAM(s)/deciliter  magnesium hydroxide Suspension 30 milliLiter(s) Oral daily PRN Constipation  melatonin 3 milliGRAM(s) Oral at bedtime PRN Insomnia  ondansetron Injectable 4 milliGRAM(s) IV Push every 8 hours PRN Nausea and/or Vomiting  oxyCODONE    IR 2.5 milliGRAM(s) Oral every 4 hours PRN Moderate Pain (4 - 6)  oxyCODONE    IR 5 milliGRAM(s) Oral every 4 hours PRN Severe Pain (7 - 10)  traMADol 25 milliGRAM(s) Oral every 4 hours PRN Mild Pain (1 - 3)      VITALS:  T(F): 97.8 (12-22-24 @ 04:00), Max: 97.9 (12-21-24 @ 15:48)  HR: 89 (12-22-24 @ 04:00) (64 - 89)  BP: 109/66 (12-22-24 @ 04:00) (106/68 - 132/90)  RR: 18 (12-22-24 @ 04:00) (11 - 18)  SpO2: 96% (12-22-24 @ 04:00) (95% - 100%)  Wt(kg): --    I&O's Summary    21 Dec 2024 07:01  -  22 Dec 2024 07:00  --------------------------------------------------------  IN: 1800 mL / OUT: 400 mL / NET: 1400 mL        CAPILLARY BLOOD GLUCOSE      POCT Blood Glucose.: 160 mg/dL (22 Dec 2024 07:48)      PHYSICAL EXAM:  General: non-toxic  HEENT: non-traumatic, perrla, eomi  Cardio: s1s2 regular rate and rhythm  Lungs: comfortable breathing, clear to auscultation  Abdomen: Soft, non-tender, non-distended  Neuro: AOx4  Ext: Pulses +2, R arm swelling, TTP    LABS:                            10.9   13.17 )-----------( 405      ( 22 Dec 2024 07:05 )             34.5     12-22    136  |  106  |  20  ----------------------------<  155[H]  4.7   |  24  |  1.29    Ca    9.2      22 Dec 2024 07:05  Phos  3.3     12-22  Mg     2.1     12-22    TPro  7.6  /  Alb  2.9[L]  /  TBili  0.3  /  DBili  x   /  AST  19  /  ALT  47  /  AlkPhos  108  12-22        LIVER FUNCTIONS - ( 22 Dec 2024 07:05 )  Alb: 2.9 g/dL / Pro: 7.6 gm/dL / ALK PHOS: 108 U/L / ALT: 47 U/L / AST: 19 U/L / GGT: x           PT/INR - ( 21 Dec 2024 19:00 )   PT: 12.6 sec;   INR: 1.10 ratio         PTT - ( 21 Dec 2024 19:00 )  PTT:35.2 sec  Urinalysis Basic - ( 22 Dec 2024 07:05 )    Color: x / Appearance: x / SG: x / pH: x  Gluc: 155 mg/dL / Ketone: x  / Bili: x / Urobili: x   Blood: x / Protein: x / Nitrite: x   Leuk Esterase: x / RBC: x / WBC x   Sq Epi: x / Non Sq Epi: x / Bacteria: x              CULTURES:  – Blood culture 12/21 X2, pending  – Wound culture 12/21 pending      Additional results/Imaging, I have personally reviewed:      ACC: [id number] EXAM: CT UPR EXT IC RT   PROCEDURE DATE: 12/21/2024  INTERPRETATION: CT OF RIGHT UPPER EXTREMITY    IMPRESSION:    Small volume neck mixed subcutaneous and intermuscular abscess along the lateral margin of right proximal to mid forearm with deep margin extending between the flexor and extensor compartments and abutting the radial vascular bundle. No tracking soft tissue emphysema.

## 2024-12-22 NOTE — CONSULT NOTE ADULT - ASSESSMENT
Assessment:  49M with CAD, CABG presents 12/21 with worsening wound on R arm  Recently admitted at [facility] for ACS, cardiac cath showed 3V occlusion  and patient was transferred to [facility] for eval of CABG. Patient had R radial artery harvest and quadruple bypass on 11/22/24. Procedure complicated by RUE compartment syndrome, hematoma in RUE 2/2 R radial arterial line requiring intraop vascular consult and fasciotomy  Reports that no issues after procedure until he got home 3-4 days later and noted erythema and swelling with drainage  No fever or chills  Afebrile on admission, on RA  WBC 13  Cr 1.29  CT with Inflammatory stranding surrounds a heterogeneously peripherally enhancing ovoid mixed subcutaneous and intermuscular collection along the lateral margin of the forearm at the level of the radial proximal to mid diaphysis with deep margin extending between the flexor and extensor compartments and abutting the radial vascular bundle, measures approximately 2.8 x 1.6 x 6.3 cm in maximal AP, transverse, and longitudinal dimensions.     Antimicrobials:  ceFAZolin  Injectable. 2000 every 8 hours (12/21 --- ) CTX (12/22 --- )  metroNIDAZOLE  IVPB 500 every 8 hours (12/21 --- ) Doxy (12/22 --- )    Impression:   #R Arm Abscess, Cellulitis  #CAD, CABG  - s/p I&D (12/21)    Recommendations:  - switch Cefazolin to empiric CTX 2G q24  - switch Flagyl to Doxy 100mg q12  - monitor temperature curve  - trend WBC  - reason for abx use reviewed with patient  - side effects of antibiotic discussed, tolerating abx well so far  - follow up BCx x2  - follow up Wound culture  - Ortho appreciated  - rest per primary team    Clinical team may change from intravenous to oral antibiotics when the following criteria are met:   1. Patient is clinically improving/stable       a)	Improved signs and symptoms of infection from initial presentation       b)	Afebrile for 24 hours       c)	Leukocytosis trending towards normal range   2. Patient is tolerating oral intake   3. Initial/repeat blood cultures are negative OR do not need to wait for preliminary blood cultures to result    Cannot advise changing to oral antibiotic therapy until culture sensitivity is available.

## 2024-12-22 NOTE — CONSULT NOTE ADULT - SUBJECTIVE AND OBJECTIVE BOX
Patient is a 49y old  Male who presents with a chief complaint of   HPI:  49M w/ PMHx of smoking, presents to ED for wound check. Patient admitted at [facility] for ACS, cardiac cath showed 3V occlusion  and patient was transferred to [facility] for eval of CABG. Patient had R radial artery harvest and quadruple bypass on 11/22/24. Procedure complicated by RUE compartment syndrome, hematoma in RUE 2/2 R radial arterial line requiring intraop vascular consult and fasciotomy. On 11/27/24 RUE forearm wound washed out with hydrogen peroxide and closed in layers. Patient noted after the recent procedure he had no issues until 2-3 days ago opened up and had purulent drainage with erythema and warmth to area. In ED patient had CT noted for right forearm abscess, see full imaging results below. Patient underwent I&D by orthopaedics (12/21/24), Admitted to  for further care.  (21 Dec 2024 21:07)  Above HPI reviewed and reconciled with patient    49M with CAD, CABG presents 12/21 with worsening wound on R arm  Recently admitted at [facility] for ACS, cardiac cath showed 3V occlusion  and patient was transferred to [facility] for eval of CABG. Patient had R radial artery harvest and quadruple bypass on 11/22/24. Procedure complicated by RUE compartment syndrome, hematoma in RUE 2/2 R radial arterial line requiring intraop vascular consult and fasciotomy  Reports that no issues after procedure until he got home 3-4 days later and noted erythema and swelling with drainage  No fever or chills  Afebrile on admission, on RA  WBC 13  Cr 1.29  CT with Inflammatory stranding surrounds a heterogeneously peripherally enhancing ovoid mixed subcutaneous and intermuscular collection along the lateral margin of the forearm at the level of the radial proximal to mid diaphysis with deep margin extending between the flexor and extensor compartments and abutting the radial vascular bundle, measures approximately 2.8 x 1.6 x 6.3 cm in maximal AP, transverse, and longitudinal dimensions.     PAST MEDICAL & SURGICAL HISTORY:  Back pain      CAD (coronary artery disease)      Open wound of right forearm      S/P CABG (coronary artery bypass graft)        FAMILY HISTORY:    Social Hx: Denies alcohol, tobacco, recreational drug use    Allergies  No Known Allergies    ANTIMICROBIALS (past 90 days)  MEDICATIONS  (STANDING):    ceFAZolin  Injectable.   2000 milliGRAM(s) IV Push (12-22-24 @ 05:36)   2000 milliGRAM(s) IV Push (12-21-24 @ 21:52)    cefepime Injectable   2000 milliGRAM(s) IntraMuscular (12-21-24 @ 12:29)    metroNIDAZOLE  IVPB   100 mL/Hr IV Intermittent (12-22-24 @ 06:07)   100 mL/Hr IV Intermittent (12-21-24 @ 23:29)    vancomycin  IVPB   300 mL/Hr IV Intermittent (12-21-24 @ 12:30)        ACTIVE ANTIMICROBIALS  ceFAZolin  Injectable. 2000 every 8 hours (12/21 --- )  metroNIDAZOLE  IVPB 500 every 8 hours (12/21 --- )    MEDICATIONS  (STANDING):  acetaminophen     Tablet .. 1000 every 8 hours  aluminum hydroxide/magnesium hydroxide/simethicone Suspension 30 every 4 hours PRN  aspirin enteric coated 81 daily  bisacodyl 5 daily PRN  dextrose 50% Injectable 25 once  dextrose 50% Injectable 12.5 once  dextrose 50% Injectable 25 once  dextrose Oral Gel 15 once PRN  glucagon  Injectable 1 once  influenza   Vaccine 0.5 once  insulin lispro (ADMELOG) corrective regimen sliding scale  three times a day before meals  magnesium hydroxide Suspension 30 daily PRN  melatonin 3 at bedtime PRN  metoprolol succinate ER 25 daily  ondansetron Injectable 4 every 8 hours PRN  oxyCODONE    IR 2.5 every 4 hours PRN  oxyCODONE    IR 5 every 4 hours PRN  pantoprazole    Tablet 40 before breakfast  polyethylene glycol 3350 17 daily  rosuvastatin 40 at bedtime  senna 2 at bedtime  traMADol 25 every 4 hours PRN      REVIEW OF SYSTEMS  [  ] ROS unobtainable because:    [ x ] All other systems negative except as noted below:	    Constitutional:  [ ] fever [ ] chills  [ ] weight loss  [ ] weakness  Skin:  [ ] rash [ ] phlebitis	  Eyes: [ ] icterus [ ] pain  [ ] discharge	  ENMT: [ ] sore throat  [ ] thrush [ ] ulcers [ ] exudates  Respiratory: [ ] dyspnea [ ] hemoptysis [ ] cough [ ] sputum	  Cardiovascular:  [ ] chest pain [ ] palpitations [ ] edema	  Gastrointestinal:  [ ] nausea [ ] vomiting [ ] diarrhea [ ] constipation [ ] pain	  Genitourinary:  [ ] dysuria [ ] frequency [ ] hematuria [ ] discharge [ ] flank pain  [ ] incontinence  Musculoskeletal:  [ ] myalgias [ ] arthralgias [ ] arthritis  [ ] back pain  Neurological:  [ ] headache [ ] seizures  [ ] confusion/altered mental status  Psychiatric:  [ ] anxiety [ ] depression	  Hematology/Lymphatics:  [ ] lymphadenopathy  Endocrine:  [ ] adrenal [ ] thyroid  Allergic/Immunologic:	 [ ] transplant [ ] seasonal    Vital Signs Last 24 Hrs  T(C): 36.6 (22 Dec 2024 04:00), Max: 36.9 (21 Dec 2024 09:16)  T(F): 97.8 (22 Dec 2024 04:00), Max: 98.4 (21 Dec 2024 09:16)  HR: 89 (22 Dec 2024 04:00) (64 - 89)  BP: 109/66 (22 Dec 2024 04:00) (106/68 - 132/90)  BP(mean): 79 (21 Dec 2024 15:48) (75 - 89)  RR: 18 (22 Dec 2024 04:00) (11 - 18)  SpO2: 96% (22 Dec 2024 04:00) (95% - 100%)    Parameters below as of 22 Dec 2024 04:00  Patient On (Oxygen Delivery Method): room air        Physical Exam:  Constitutional:  NAD  Head/Eyes: no icterus  LUNGS:  CTA  CVS:  regular rhythm  Abd:  soft, non-tender; non-distended  Ext: R arm swelling, TTP  Vascular:  IV site no erythema tenderness or discharge  Neuro: AAO X 3, non- focal    Labs: all available labs reviewed                        10.9   13.17 )-----------( 405      ( 22 Dec 2024 07:05 )             34.5     12-22    136  |  106  |  20  ----------------------------<  155[H]  4.7   |  24  |  1.29    Ca    9.2      22 Dec 2024 07:05  Phos  3.3     12-22  Mg     2.1     12-22    TPro  7.6  /  Alb  2.9[L]  /  TBili  0.3  /  DBili  x   /  AST  19  /  ALT  47  /  AlkPhos  108  12-22     LIVER FUNCTIONS - ( 22 Dec 2024 07:05 )  Alb: 2.9 g/dL / Pro: 7.6 gm/dL / ALK PHOS: 108 U/L / ALT: 47 U/L / AST: 19 U/L / GGT: x           Urinalysis Basic - ( 22 Dec 2024 07:05 )    Color: x / Appearance: x / SG: x / pH: x  Gluc: 155 mg/dL / Ketone: x  / Bili: x / Urobili: x   Blood: x / Protein: x / Nitrite: x   Leuk Esterase: x / RBC: x / WBC x   Sq Epi: x / Non Sq Epi: x / Bacteria: x          Radiology: all available radiological tests reviewed  < from: CT Upper Extremity w/ IV Cont, Right (12.21.24 @ 10:56) >  Osseous: No acute fracture or dislocation. Mineralization within normal   limits.    Soft tissues: Inflammatory stranding surrounds a heterogeneously   peripherally enhancing ovoid mixed subcutaneous and intermuscular   collection along the lateral margin of the forearm at the level of the   radial proximal to mid diaphysis with deep margin extending between the   flexor and extensor compartments and abutting the radial vascular bundle,   measures approximately 2.8 x 1.6 x 6.3 cm in maximal AP, transverse, and   longitudinal dimensions. Overlying draining dermal wound/ulcer. No   definite intramuscular component. Radial nerve is spared. No radiopaque   foreign body or tracking emphysema. No lymphadenopathy.    IMPRESSION:    Small volume neck mixed subcutaneous and intermuscular abscess along the   lateral margin of right proximal to mid forearm with deep margin   extending between the flexor and extensor compartments and abutting the   radial vascular bundle. No tracking soft tissue emphysema.    < end of copied text >      Advanced directives addressed: full resuscitation

## 2024-12-22 NOTE — DISCHARGE NOTE PROVIDER - CARE PROVIDER_API CALL
Gianni Cardoso.  Orthopaedic Surgery  [address] [place] [address]  Phone: [phone]  Fax: [phone]  Follow Up Time: 1 week

## 2024-12-23 LAB
ANION GAP SERPL CALC-SCNC: 4 MMOL/L — LOW (ref 5–17)
BASOPHILS # BLD AUTO: 0.11 K/UL — SIGNIFICANT CHANGE UP (ref 0–0.2)
BASOPHILS NFR BLD AUTO: 0.8 % — SIGNIFICANT CHANGE UP (ref 0–2)
BUN SERPL-MCNC: 21 MG/DL — SIGNIFICANT CHANGE UP (ref 7–23)
CALCIUM SERPL-MCNC: 9.1 MG/DL — SIGNIFICANT CHANGE UP (ref 8.5–10.1)
CHLORIDE SERPL-SCNC: 107 MMOL/L — SIGNIFICANT CHANGE UP (ref 96–108)
CO2 SERPL-SCNC: 27 MMOL/L — SIGNIFICANT CHANGE UP (ref 22–31)
CREAT SERPL-MCNC: 1.13 MG/DL — SIGNIFICANT CHANGE UP (ref 0.5–1.3)
CRP SERPL-MCNC: 14.2 MG/ML — HIGH (ref 0–5)
EGFR: 80 ML/MIN/1.73M2 — SIGNIFICANT CHANGE UP
EOSINOPHIL # BLD AUTO: 0.81 K/UL — HIGH (ref 0–0.5)
EOSINOPHIL NFR BLD AUTO: 5.8 % — SIGNIFICANT CHANGE UP (ref 0–6)
ERYTHROCYTE [SEDIMENTATION RATE] IN BLOOD: 35 MM/HR — HIGH (ref 0–15)
FERRITIN SERPL-MCNC: 359 NG/ML — SIGNIFICANT CHANGE UP (ref 30–400)
FOLATE SERPL-MCNC: 5 NG/ML — SIGNIFICANT CHANGE UP
GLUCOSE SERPL-MCNC: 110 MG/DL — HIGH (ref 70–99)
HCT VFR BLD CALC: 34.1 % — LOW (ref 39–50)
HGB BLD-MCNC: 10.6 G/DL — LOW (ref 13–17)
IMM GRANULOCYTES NFR BLD AUTO: 1.1 % — HIGH (ref 0–0.9)
IRON SATN MFR SERPL: 14 % — LOW (ref 16–55)
IRON SATN MFR SERPL: 44 UG/DL — LOW (ref 45–165)
LYMPHOCYTES # BLD AUTO: 23.6 % — SIGNIFICANT CHANGE UP (ref 13–44)
LYMPHOCYTES # BLD AUTO: 3.27 K/UL — SIGNIFICANT CHANGE UP (ref 1–3.3)
MCHC RBC-ENTMCNC: 28 PG — SIGNIFICANT CHANGE UP (ref 27–34)
MCHC RBC-ENTMCNC: 31.1 G/DL — LOW (ref 32–36)
MCV RBC AUTO: 90.2 FL — SIGNIFICANT CHANGE UP (ref 80–100)
MONOCYTES # BLD AUTO: 0.88 K/UL — SIGNIFICANT CHANGE UP (ref 0–0.9)
MONOCYTES NFR BLD AUTO: 6.4 % — SIGNIFICANT CHANGE UP (ref 2–14)
NEUTROPHILS # BLD AUTO: 8.63 K/UL — HIGH (ref 1.8–7.4)
NEUTROPHILS NFR BLD AUTO: 62.3 % — SIGNIFICANT CHANGE UP (ref 43–77)
PLATELET # BLD AUTO: 402 K/UL — HIGH (ref 150–400)
POTASSIUM SERPL-MCNC: 4.2 MMOL/L — SIGNIFICANT CHANGE UP (ref 3.5–5.3)
POTASSIUM SERPL-SCNC: 4.2 MMOL/L — SIGNIFICANT CHANGE UP (ref 3.5–5.3)
RBC # BLD: 3.78 M/UL — LOW (ref 4.2–5.8)
RBC # FLD: 13.8 % — SIGNIFICANT CHANGE UP (ref 10.3–14.5)
SODIUM SERPL-SCNC: 138 MMOL/L — SIGNIFICANT CHANGE UP (ref 135–145)
TIBC SERPL-MCNC: 312 UG/DL — SIGNIFICANT CHANGE UP (ref 220–430)
UIBC SERPL-MCNC: 268 UG/DL — SIGNIFICANT CHANGE UP (ref 110–370)
VIT B12 SERPL-MCNC: 397 PG/ML — SIGNIFICANT CHANGE UP (ref 232–1245)
WBC # BLD: 13.85 K/UL — HIGH (ref 3.8–10.5)
WBC # FLD AUTO: 13.85 K/UL — HIGH (ref 3.8–10.5)

## 2024-12-23 PROCEDURE — 99232 SBSQ HOSP IP/OBS MODERATE 35: CPT

## 2024-12-23 RX ADMIN — Medication 25 MILLIGRAM(S): at 21:34

## 2024-12-23 RX ADMIN — ACETAMINOPHEN 1000 MILLIGRAM(S): 80 SOLUTION/ DROPS ORAL at 21:35

## 2024-12-23 RX ADMIN — Medication 5 MILLIGRAM(S): at 23:49

## 2024-12-23 RX ADMIN — Medication 5 MILLIGRAM(S): at 23:19

## 2024-12-23 RX ADMIN — HEPARIN SODIUM 5000 UNIT(S): 1000 INJECTION, SOLUTION INTRAVENOUS; SUBCUTANEOUS at 05:55

## 2024-12-23 RX ADMIN — Medication 110 MILLIGRAM(S): at 09:34

## 2024-12-23 RX ADMIN — HEPARIN SODIUM 5000 UNIT(S): 1000 INJECTION, SOLUTION INTRAVENOUS; SUBCUTANEOUS at 13:51

## 2024-12-23 RX ADMIN — SENNOSIDES 2 TABLET(S): 8.6 TABLET, FILM COATED ORAL at 21:34

## 2024-12-23 RX ADMIN — Medication 5 MILLIGRAM(S): at 10:20

## 2024-12-23 RX ADMIN — ACETAMINOPHEN 1000 MILLIGRAM(S): 80 SOLUTION/ DROPS ORAL at 05:56

## 2024-12-23 RX ADMIN — Medication 17 GRAM(S): at 09:34

## 2024-12-23 RX ADMIN — CEFTRIAXONE SODIUM 2000 MILLIGRAM(S): 1 INJECTION, POWDER, FOR SOLUTION INTRAMUSCULAR; INTRAVENOUS at 09:35

## 2024-12-23 RX ADMIN — ACETAMINOPHEN 1000 MILLIGRAM(S): 80 SOLUTION/ DROPS ORAL at 13:51

## 2024-12-23 RX ADMIN — Medication 110 MILLIGRAM(S): at 21:35

## 2024-12-23 RX ADMIN — Medication 5 MILLIGRAM(S): at 09:42

## 2024-12-23 RX ADMIN — HEPARIN SODIUM 5000 UNIT(S): 1000 INJECTION, SOLUTION INTRAVENOUS; SUBCUTANEOUS at 21:34

## 2024-12-23 RX ADMIN — Medication 81 MILLIGRAM(S): at 09:34

## 2024-12-23 RX ADMIN — AMIODARONE HYDROCHLORIDE 200 MILLIGRAM(S): 200 TABLET ORAL at 21:34

## 2024-12-23 RX ADMIN — ROSUVASTATIN 40 MILLIGRAM(S): 40 TABLET, FILM COATED ORAL at 21:34

## 2024-12-23 NOTE — PROGRESS NOTE ADULT - SUBJECTIVE AND OBJECTIVE BOX
Patient seen and examined at bedside. No acute complaints at this time. Pain well controlled. Denies chest pain, shortness of breath, nausea or vomiting.     LABS:                        10.9   13.17 )-----------( 405      ( 22 Dec 2024 07:05 )             34.5     12-22    136  |  106  |  20  ----------------------------<  155[H]  4.7   |  24  |  1.29    Ca    9.2      22 Dec 2024 07:05  Phos  3.3     12-22  Mg     2.1     12-22    TPro  7.6  /  Alb  2.9[L]  /  TBili  0.3  /  DBili  x   /  AST  19  /  ALT  47  /  AlkPhos  108  12-22    PT/INR - ( 21 Dec 2024 19:00 )   PT: 12.6 sec;   INR: 1.10 ratio         PTT - ( 21 Dec 2024 19:00 )  PTT:35.2 sec  Urinalysis Basic - ( 22 Dec 2024 07:05 )    Color: x / Appearance: x / SG: x / pH: x  Gluc: 155 mg/dL / Ketone: x  / Bili: x / Urobili: x   Blood: x / Protein: x / Nitrite: x   Leuk Esterase: x / RBC: x / WBC x   Sq Epi: x / Non Sq Epi: x / Bacteria: x        VITAL SIGNS:  T(C): 36.4 (12-23-24 @ 08:00), Max: 36.9 (12-23-24 @ 00:00)  HR: 67 (12-23-24 @ 08:00) (64 - 80)  BP: 108/71 (12-23-24 @ 08:00) (99/67 - 116/77)  RR: 18 (12-23-24 @ 08:00) (18 - 18)  SpO2: 95% (12-23-24 @ 08:00) (95% - 99%)    PE:    General: NAD, resting comfortably in bed  RUE:   Dressing C/D/I  Compartments soft and compressible  +Axillary/Musculocutaneous/Radial/Median/AIN/PIN intact  SILT med/rad/uln/labc/ax  fingers wwp      A/P:  49y M POD 2 s/p R forearm I&D     -WBAT RUE  -Pain Control  -DVT ppx per primary  -Continue abx, FU ID Recs  -FU AM Labs, trend CRP q48  -OR Cx: Pending  -BCx: NGTD  -Packing DC'd 12/23.   -Recommend Home wound care to change dry dressings   -Rest, ice, compress and elevate the extremity as needed  -Incentive Spirometry  -Medical comanagement appreciated  -dispo pending medical course    No acute orthopaedic surgical intervention indicated at this time. This patient is orthopaedically stable for discharge.   Patient to follow up with Dr. Cardoso in one week as an outpatient for further evaluation and management.   All of the patient's questions and concerns were answered and addressed.    -will discuss with Dr. Cardoso and advise with changes to plan Patient seen and examined at bedside. No acute complaints at this time. Pain well controlled. Denies chest pain, shortness of breath, nausea or vomiting.     LABS:                        10.9   13.17 )-----------( 405      ( 22 Dec 2024 07:05 )             34.5     12-22    136  |  106  |  20  ----------------------------<  155[H]  4.7   |  24  |  1.29    Ca    9.2      22 Dec 2024 07:05  Phos  3.3     12-22  Mg     2.1     12-22    TPro  7.6  /  Alb  2.9[L]  /  TBili  0.3  /  DBili  x   /  AST  19  /  ALT  47  /  AlkPhos  108  12-22    PT/INR - ( 21 Dec 2024 19:00 )   PT: 12.6 sec;   INR: 1.10 ratio         PTT - ( 21 Dec 2024 19:00 )  PTT:35.2 sec  Urinalysis Basic - ( 22 Dec 2024 07:05 )    Color: x / Appearance: x / SG: x / pH: x  Gluc: 155 mg/dL / Ketone: x  / Bili: x / Urobili: x   Blood: x / Protein: x / Nitrite: x   Leuk Esterase: x / RBC: x / WBC x   Sq Epi: x / Non Sq Epi: x / Bacteria: x        VITAL SIGNS:  T(C): 36.4 (12-23-24 @ 08:00), Max: 36.9 (12-23-24 @ 00:00)  HR: 67 (12-23-24 @ 08:00) (64 - 80)  BP: 108/71 (12-23-24 @ 08:00) (99/67 - 116/77)  RR: 18 (12-23-24 @ 08:00) (18 - 18)  SpO2: 95% (12-23-24 @ 08:00) (95% - 99%)    PE:    General: NAD, resting comfortably in bed  RUE:   Dressing C/D/I  Compartments soft and compressible  +Axillary/Musculocutaneous/Radial/Median/AIN/PIN intact  SILT med/rad/uln/labc/ax  fingers wwp      A/P:  49y M POD 2 s/p R forearm I&D     -WBAT RUE  -Pain Control  -DVT ppx per primary  -Continue abx, FU ID Recs  -FU AM Labs, trend CRP q48  -OR Cx: Pending  -BCx: NGTD  -Packing DC'd 12/23.   -Recommend Home wound care to change dry dressings   -Rest, ice, compress and elevate the extremity as needed  -Incentive Spirometry  -Medical comanagement appreciated  -FU Outpatient with Dr. Cardoso on 12/27    No acute orthopaedic surgical intervention indicated at this time. This patient is orthopaedically stable for discharge.   Patient to follow up with Dr. Cardoso this Friday 12/27 as an outpatient for further evaluation and management.   All of the patient's questions and concerns were answered and addressed.    -will discuss with Dr. Cardoso and advise with changes to plan

## 2024-12-23 NOTE — PROGRESS NOTE ADULT - SUBJECTIVE AND OBJECTIVE BOX
Date of Service:12-23-24 @ 09:54  Interval History/ROS: Afebrile overnight, comfortable on RA, BCx so far negative, Wound culture with few Staph aureus, reports no fever or chills       REVIEW OF SYSTEMS  [  ] ROS unobtainable because:    [ x ] All other systems negative except as noted below    Constitutional:  [ ] fever [ ] chills  [ ] weight loss  [ ]night sweat  [ ]poor appetite/PO intake [ ]fatigue   Skin:  [ ] rash [ ] phlebitis	  Eyes: [ ] icterus [ ] pain  [ ] discharge	  ENMT: [ ] sore throat  [ ] thrush [ ] ulcers [ ] exudates [ ]anosmia  Respiratory: [ ] dyspnea [ ] hemoptysis [ ] cough [ ] sputum	  Cardiovascular:  [ ] chest pain [ ] palpitations [ ] edema	  Gastrointestinal:  [ ] nausea [ ] vomiting [ ] diarrhea [ ] constipation [ ] pain	  Genitourinary:  [ ] dysuria [ ] frequency [ ] hematuria [ ] discharge [ ] flank pain  [ ] incontinence  Musculoskeletal:  [ ] myalgias [ ] arthralgias [ ] arthritis  [ ] back pain  Neurological:  [ ] headache [ ] weakness [ ] seizures  [ ] confusion/altered mental status    Allergies  No Known Allergies        ANTIMICROBIALS:    cefTRIAXone Injectable. 2000 every 24 hours  doxycycline IVPB 100 every 12 hours        OTHER MEDS: MEDICATIONS  (STANDING):  acetaminophen     Tablet .. 1000 every 8 hours  aluminum hydroxide/magnesium hydroxide/simethicone Suspension 30 every 4 hours PRN  aMIOdarone    Tablet 200 daily  aspirin enteric coated 81 daily  bisacodyl 5 daily PRN  dextrose 50% Injectable 25 once  dextrose 50% Injectable 12.5 once  dextrose 50% Injectable 25 once  dextrose Oral Gel 15 once PRN  glucagon  Injectable 1 once  heparin   Injectable 5000 every 8 hours  influenza   Vaccine 0.5 once  magnesium hydroxide Suspension 30 daily PRN  melatonin 3 at bedtime PRN  metoprolol succinate ER 25 daily  ondansetron Injectable 4 every 8 hours PRN  oxyCODONE    IR 2.5 every 4 hours PRN  oxyCODONE    IR 5 every 4 hours PRN  pantoprazole    Tablet 40 before breakfast  polyethylene glycol 3350 17 daily  rosuvastatin 40 at bedtime  senna 2 at bedtime  traMADol 25 every 4 hours PRN      Vital Signs Last 24 Hrs  T(F): 97.5 (12-23-24 @ 08:00), Max: 98.4 (12-21-24 @ 09:16)    Vital Signs Last 24 Hrs  HR: 67 (12-23-24 @ 08:00) (64 - 80)  BP: 108/71 (12-23-24 @ 08:00) (103/58 - 116/77)  RR: 18 (12-23-24 @ 08:00)  SpO2: 95% (12-23-24 @ 08:00) (95% - 99%)  Wt(kg): --    EXAM:    Constitutional:  NAD  Head/Eyes: no icterus  LUNGS:  CTA  CVS:  regular rhythm  Abd:  soft, non-tender; non-distended  Ext: R arm swelling, TTP improving  Vascular:  IV site no erythema tenderness or discharge  Neuro: AAO X 3, non- focal    Labs:                        10.6   13.85 )-----------( 402      ( 23 Dec 2024 08:06 )             34.1     12-23    138  |  107  |  21  ----------------------------<  110[H]  4.2   |  27  |  1.13    Ca    9.1      23 Dec 2024 08:06  Phos  3.3     12-22  Mg     2.1     12-22    TPro  7.6  /  Alb  2.9[L]  /  TBili  0.3  /  DBili  x   /  AST  19  /  ALT  47  /  AlkPhos  108  12-22      WBC Trend:  WBC Count: 13.85 (12-23-24 @ 08:06)  WBC Count: 13.17 (12-22-24 @ 07:05)  WBC Count: 13.40 (12-21-24 @ 10:28)      Creatine Trend:  Creatinine: 1.13 (12-23)  Creatinine: 1.29 (12-22)  Creatinine: 1.30 (12-21)      Liver Biochemical Testing Trend:  Alanine Aminotransferase (ALT/SGPT): 47 (12-22)  Alanine Aminotransferase (ALT/SGPT): 51 (12-21)  Alanine Aminotransferase (ALT/SGPT): 46 *H* (11-27)  Alanine Aminotransferase (ALT/SGPT): 30 (11-26)  Alanine Aminotransferase (ALT/SGPT): 28 (11-25)  Aspartate Aminotransferase (AST/SGOT): 19 (12-22-24 @ 07:05)  Aspartate Aminotransferase (AST/SGOT): 22 (12-21-24 @ 10:28)  Aspartate Aminotransferase (AST/SGOT): 47 (11-27-24 @ 04:01)  Aspartate Aminotransferase (AST/SGOT): 46 (11-26-24 @ 04:04)  Aspartate Aminotransferase (AST/SGOT): 50 (11-25-24 @ 04:55)  Bilirubin Total: 0.3 (12-22)  Bilirubin Total: 0.4 (12-21)  Bilirubin Total: 0.4 (11-27)  Bilirubin Total: 0.4 (11-26)  Bilirubin Total: 0.4 (11-25)      Trend LDH      Urinalysis Basic - ( 23 Dec 2024 08:06 )    Color: x / Appearance: x / SG: x / pH: x  Gluc: 110 mg/dL / Ketone: x  / Bili: x / Urobili: x   Blood: x / Protein: x / Nitrite: x   Leuk Esterase: x / RBC: x / WBC x   Sq Epi: x / Non Sq Epi: x / Bacteria: x        MICROBIOLOGY:        Culture - Fungal, Other (collected 21 Dec 2024 19:38)  Source: .Other  Preliminary Report:    Testing in progress    Culture - Fungal, Other (collected 21 Dec 2024 19:38)  Source: .Other  Preliminary Report:    Testing in progress    Culture - Fungal, Other (collected 21 Dec 2024 19:38)  Source: .Other  Preliminary Report:    Testing in progress    Culture - Blood (collected 21 Dec 2024 10:28)  Source: .Blood BLOOD  Preliminary Report:    No growth at 24 hours    Culture - Blood (collected 21 Dec 2024 10:28)  Source: .Blood BLOOD  Preliminary Report:    No growth at 24 hours    Urinalysis with Rflx Culture (collected 18 Nov 2024 21:55)      C-Reactive Protein: 14.2 (12-23)  C-Reactive Protein: 43.4 (12-21)    Lactate, Blood: 2.0 (12-21 @ 10:28)    Sedimentation Rate, Erythrocyte: 35 mm/hr (12-23-24 @ 08:06)  Sedimentation Rate, Erythrocyte: 43 mm/hr (12-21-24 @ 10:28)      RADIOLOGY:  imaging below personally reviewed

## 2024-12-23 NOTE — PROGRESS NOTE ADULT - SUBJECTIVE AND OBJECTIVE BOX
HOSPITALIST PROGRESS NOTE:  SUBJECTIVE:  PCP:  Chief Complaint: Patient is a 49y old  Male who presents with a chief complaint of right arm abscess, cellulitis (22 Dec 2024 11:19)      HPI:  49M w/ PMHx of smoking, presents to ED for wound check. Patient admitted at [facility] for ACS, cardiac cath showed 3V occlusion  and patient was transferred to [facility] for eval of CABG. Patient had R radial artery harvest and quadruple bypass on 11/22/24. Procedure complicated by RUE compartment syndrome, hematoma in RUE 2/2 R radial arterial line requiring intraop vascular consult and fasciotomy. On 11/27/24 RUE forearm wound washed out with hydrogen peroxide and closed in layers. Patient noted after the recent procedure he had no issues until 2-3 days ago opened up and had purulent drainage with erythema and warmth to area. In ED patient had CT noted for right forearm abscess, see full imaging results below. Patient underwent I&D by orthopaedics (12/21/24), Admitted to  for further care.     (21 Dec 2024 21:07)    12/23:  Above reviewed; patient wants to go home but sensitivty not back; Patient has no other complaints;       Allergies:  No Known Allergies    REVIEW OF SYSTEMS:  See HPI. All other review of systems is negative unless indicated above.     OBJECTIVE  Physical Exam:  Vital Signs:    Vital Signs Last 24 Hrs  T(C): 36.4 (23 Dec 2024 08:00), Max: 36.9 (23 Dec 2024 00:00)  T(F): 97.5 (23 Dec 2024 08:00), Max: 98.4 (23 Dec 2024 00:00)  HR: 67 (23 Dec 2024 08:00) (64 - 78)  BP: 108/71 (23 Dec 2024 08:00) (103/58 - 116/66)  BP(mean): --  RR: 18 (23 Dec 2024 08:00) (18 - 18)  SpO2: 95% (23 Dec 2024 08:00) (95% - 99%)    Parameters below as of 23 Dec 2024 08:00  Patient On (Oxygen Delivery Method): room air      I&O's Summary    22 Dec 2024 07:01  -  23 Dec 2024 07:00  --------------------------------------------------------  IN: 2120 mL / OUT: 900 mL / NET: 1220 mL    Constitutional: NAD, awake and alert  Neurological: AAO x 3, no focal deficits  HEENT: PERRLA, EOMI, MMM  Neck: Soft and supple, No LAD, No JVD  Respiratory: Breath sounds are clear bilaterally, No wheezing, rales or rhonchi  Cardiovascular: S1 and S2, regular rate and rhythm; no Murmurs, gallops or rubs  Gastrointestinal: Bowel Sounds present, soft, nontender, nondistended, no guarding, no rebound tenderness  Back: No CVA tenderness   Extremities: No peripheral edema +RUE wrapped in gauze and ACE bandage   Vascular: 2+ peripheral pulses  Musculoskeletal: 5/5 strength b/l upper and lower extremities  Skin: No rashes  Breast: Deferred  Rectal: Deferred    MEDICATIONS  (STANDING):  acetaminophen     Tablet .. 1000 milliGRAM(s) Oral every 8 hours  aMIOdarone    Tablet 200 milliGRAM(s) Oral daily  aspirin enteric coated 81 milliGRAM(s) Oral daily  cefTRIAXone Injectable. 2000 milliGRAM(s) IV Push every 24 hours  dextrose 5%. 1000 milliLiter(s) (50 mL/Hr) IV Continuous <Continuous>  dextrose 5%. 1000 milliLiter(s) (100 mL/Hr) IV Continuous <Continuous>  dextrose 50% Injectable 25 Gram(s) IV Push once  dextrose 50% Injectable 12.5 Gram(s) IV Push once  dextrose 50% Injectable 25 Gram(s) IV Push once  doxycycline IVPB 100 milliGRAM(s) IV Intermittent every 12 hours  glucagon  Injectable 1 milliGRAM(s) IntraMuscular once  heparin   Injectable 5000 Unit(s) SubCutaneous every 8 hours  influenza   Vaccine 0.5 milliLiter(s) IntraMuscular once  lactated ringers. 1000 milliLiter(s) (100 mL/Hr) IV Continuous <Continuous>  metoprolol succinate ER 25 milliGRAM(s) Oral daily  naloxone Injectable 0.4 milliGRAM(s) IV Push once  pantoprazole    Tablet 40 milliGRAM(s) Oral before breakfast  polyethylene glycol 3350 17 Gram(s) Oral daily  rosuvastatin 40 milliGRAM(s) Oral at bedtime  senna 2 Tablet(s) Oral at bedtime      LABS: All Labs Reviewed:                        10.6   13.85 )-----------( 402      ( 23 Dec 2024 08:06 )             34.1     12-23    138  |  107  |  21  ----------------------------<  110[H]  4.2   |  27  |  1.13    Ca    9.1      23 Dec 2024 08:06  Phos  3.3     12-22  Mg     2.1     12-22    TPro  7.6  /  Alb  2.9[L]  /  TBili  0.3  /  DBili  x   /  AST  19  /  ALT  47  /  AlkPhos  108  12-22    PT/INR - ( 21 Dec 2024 19:00 )   PT: 12.6 sec;   INR: 1.10 ratio      PTT - ( 21 Dec 2024 19:00 )  PTT:35.2 sec    Urinalysis Basic - ( 23 Dec 2024 08:06 )    Color: x / Appearance: x / SG: x / pH: x  Gluc: 110 mg/dL / Ketone: x  / Bili: x / Urobili: x   Blood: x / Protein: x / Nitrite: x   Leuk Esterase: x / RBC: x / WBC x   Sq Epi: x / Non Sq Epi: x / Bacteria: x      Blood Culture:   12-21 @ 19:38  Organism --  Gram Stain Blood -- Gram Stain --  Specimen Source .Surgical Swab  Culture-Blood --    12-21 @ 10:28  Organism --  Gram Stain Blood -- Gram Stain --  Specimen Source .Blood BLOOD  Culture-Blood --    RADIOLOGY/EKG:    < from: CT Upper Extremity w/ IV Cont, Right (12.21.24 @ 10:56) >    IMPRESSION:    Small volume neck mixed subcutaneous and intermuscular abscess along the   lateral margin of right proximal to mid forearm with deep margin   extending between the flexor and extensor compartments and abutting the   radial vascular bundle. No tracking soft tissue emphysema.    < end of copied text >  < from: Xray Chest 2 Views PA/Lat (12.09.24 @ 13:04) >    Minimal scattered lower lung focal atelectasis. Mild hypoinflation.   Postop change. No pneumothorax. No significant pleural effusion. No lung   consolidation.    < end of copied text >

## 2024-12-24 ENCOUNTER — TRANSCRIPTION ENCOUNTER (OUTPATIENT)
Age: 49
End: 2024-12-24

## 2024-12-24 VITALS
RESPIRATION RATE: 18 BRPM | SYSTOLIC BLOOD PRESSURE: 111 MMHG | HEART RATE: 64 BPM | OXYGEN SATURATION: 97 % | TEMPERATURE: 98 F | DIASTOLIC BLOOD PRESSURE: 74 MMHG

## 2024-12-24 LAB
-  CEFTAROLINE: SIGNIFICANT CHANGE UP
-  CLINDAMYCIN: SIGNIFICANT CHANGE UP
-  ERYTHROMYCIN: SIGNIFICANT CHANGE UP
-  GENTAMICIN: SIGNIFICANT CHANGE UP
-  OXACILLIN: SIGNIFICANT CHANGE UP
-  PENICILLIN: SIGNIFICANT CHANGE UP
-  RIFAMPIN: SIGNIFICANT CHANGE UP
-  TETRACYCLINE: SIGNIFICANT CHANGE UP
-  TRIMETHOPRIM/SULFAMETHOXAZOLE: SIGNIFICANT CHANGE UP
-  VANCOMYCIN: SIGNIFICANT CHANGE UP
ANION GAP SERPL CALC-SCNC: 3 MMOL/L — LOW (ref 5–17)
BUN SERPL-MCNC: 23 MG/DL — SIGNIFICANT CHANGE UP (ref 7–23)
CALCIUM SERPL-MCNC: 9.6 MG/DL — SIGNIFICANT CHANGE UP (ref 8.5–10.1)
CHLORIDE SERPL-SCNC: 107 MMOL/L — SIGNIFICANT CHANGE UP (ref 96–108)
CO2 SERPL-SCNC: 28 MMOL/L — SIGNIFICANT CHANGE UP (ref 22–31)
CREAT SERPL-MCNC: 1.3 MG/DL — SIGNIFICANT CHANGE UP (ref 0.5–1.3)
EGFR: 67 ML/MIN/1.73M2 — SIGNIFICANT CHANGE UP
GLUCOSE SERPL-MCNC: 99 MG/DL — SIGNIFICANT CHANGE UP (ref 70–99)
HCT VFR BLD CALC: 35.5 % — LOW (ref 39–50)
HGB BLD-MCNC: 11 G/DL — LOW (ref 13–17)
MCHC RBC-ENTMCNC: 28.1 PG — SIGNIFICANT CHANGE UP (ref 27–34)
MCHC RBC-ENTMCNC: 31 G/DL — LOW (ref 32–36)
MCV RBC AUTO: 90.8 FL — SIGNIFICANT CHANGE UP (ref 80–100)
METHOD TYPE: SIGNIFICANT CHANGE UP
PLATELET # BLD AUTO: 388 K/UL — SIGNIFICANT CHANGE UP (ref 150–400)
POTASSIUM SERPL-MCNC: 5.1 MMOL/L — SIGNIFICANT CHANGE UP (ref 3.5–5.3)
POTASSIUM SERPL-SCNC: 5.1 MMOL/L — SIGNIFICANT CHANGE UP (ref 3.5–5.3)
RBC # BLD: 3.91 M/UL — LOW (ref 4.2–5.8)
RBC # FLD: 14.2 % — SIGNIFICANT CHANGE UP (ref 10.3–14.5)
SODIUM SERPL-SCNC: 138 MMOL/L — SIGNIFICANT CHANGE UP (ref 135–145)
WBC # BLD: 12.01 K/UL — HIGH (ref 3.8–10.5)
WBC # FLD AUTO: 12.01 K/UL — HIGH (ref 3.8–10.5)

## 2024-12-24 PROCEDURE — 99239 HOSP IP/OBS DSCHRG MGMT >30: CPT

## 2024-12-24 RX ORDER — LACTOBACILLUS ACIDOPHILUS/PECT 75 MM-100
2 CAPSULE ORAL
Qty: 56 | Refills: 0
Start: 2024-12-24 | End: 2025-01-06

## 2024-12-24 RX ORDER — AMIODARONE HYDROCHLORIDE 200 MG/1
1 TABLET ORAL
Qty: 30 | Refills: 0
Start: 2024-12-24 | End: 2025-01-22

## 2024-12-24 RX ORDER — CEFUROXIME AXETIL 250 MG
1 TABLET ORAL
Qty: 28 | Refills: 0
Start: 2024-12-24 | End: 2025-01-06

## 2024-12-24 RX ORDER — OXYCODONE HCL 15 MG
1 TABLET ORAL
Qty: 28 | Refills: 0
Start: 2024-12-24 | End: 2024-12-30

## 2024-12-24 RX ADMIN — HEPARIN SODIUM 5000 UNIT(S): 1000 INJECTION, SOLUTION INTRAVENOUS; SUBCUTANEOUS at 05:43

## 2024-12-24 RX ADMIN — Medication 17 GRAM(S): at 10:01

## 2024-12-24 RX ADMIN — Medication 5 MILLIGRAM(S): at 08:15

## 2024-12-24 RX ADMIN — Medication 5 MILLIGRAM(S): at 07:31

## 2024-12-24 RX ADMIN — ONDANSETRON 4 MILLIGRAM(S): 4 TABLET ORAL at 08:55

## 2024-12-24 RX ADMIN — CEFTRIAXONE SODIUM 2000 MILLIGRAM(S): 1 INJECTION, POWDER, FOR SOLUTION INTRAMUSCULAR; INTRAVENOUS at 10:02

## 2024-12-24 RX ADMIN — Medication 81 MILLIGRAM(S): at 10:02

## 2024-12-24 NOTE — PROGRESS NOTE ADULT - SUBJECTIVE AND OBJECTIVE BOX
Date of service: 12-24-24 @ 10:18    OOB ot chair  Right arm erythema is improving  Has local tenderness    ROS: no fever or chills; denies dizziness, no HA, no SOB or cough, no abdominal pain, no diarrhea or constipation; no dysuria, no legs pain, no rashes    MEDICATIONS  (STANDING):  acetaminophen     Tablet .. 1000 milliGRAM(s) Oral every 8 hours  aMIOdarone    Tablet 200 milliGRAM(s) Oral daily  aspirin enteric coated 81 milliGRAM(s) Oral daily  cefTRIAXone Injectable. 2000 milliGRAM(s) IV Push every 24 hours  dextrose 5%. 1000 milliLiter(s) (50 mL/Hr) IV Continuous <Continuous>  dextrose 5%. 1000 milliLiter(s) (100 mL/Hr) IV Continuous <Continuous>  dextrose 50% Injectable 25 Gram(s) IV Push once  dextrose 50% Injectable 12.5 Gram(s) IV Push once  dextrose 50% Injectable 25 Gram(s) IV Push once  glucagon  Injectable 1 milliGRAM(s) IntraMuscular once  heparin   Injectable 5000 Unit(s) SubCutaneous every 8 hours  influenza   Vaccine 0.5 milliLiter(s) IntraMuscular once  lactated ringers. 1000 milliLiter(s) (100 mL/Hr) IV Continuous <Continuous>  metoprolol succinate ER 25 milliGRAM(s) Oral daily  naloxone Injectable 0.4 milliGRAM(s) IV Push once  pantoprazole    Tablet 40 milliGRAM(s) Oral before breakfast  polyethylene glycol 3350 17 Gram(s) Oral daily  rosuvastatin 40 milliGRAM(s) Oral at bedtime  senna 2 Tablet(s) Oral at bedtime    Vital Signs Last 24 Hrs  T(C): 36.4 (24 Dec 2024 08:00), Max: 36.9 (24 Dec 2024 00:00)  T(F): 97.5 (24 Dec 2024 08:00), Max: 98.5 (24 Dec 2024 00:00)  HR: 64 (24 Dec 2024 08:00) (64 - 71)  BP: 111/74 (24 Dec 2024 08:00) (109/62 - 122/74)  BP(mean): 83 (23 Dec 2024 16:00) (83 - 83)  RR: 18 (24 Dec 2024 08:00) (18 - 18)  SpO2: 97% (24 Dec 2024 08:00) (96% - 100%)    Parameters below as of 24 Dec 2024 08:00  Patient On (Oxygen Delivery Method): room air     Physical exam:    Constitutional:  NAD  Head/Eyes: no icterus  LUNGS:  CTA  CVS:  regular rhythm  Abd:  soft, non-tender; non-distended  Ext: R arm swelling, TTP improving slowly  Vascular:  IV site no erythema tenderness or discharge  Neuro: AAO X 3, non- focal    Labs: reviewed                        11.0   12.01 )-----------( 388      ( 24 Dec 2024 06:50 )             35.5     12-24    138  |  107  |  23  ----------------------------<  99  5.1   |  28  |  1.30    Ca    9.6      24 Dec 2024 06:50    C-Reactive Protein: 14.2 mg/mL (12-23-24 @ 08:06)  Ferritin: 359 ng/mL (12-23-24 @ 08:06)  C-Reactive Protein: 43.4 mg/mL (12-21-24 @ 10:28)                        10.6   13.85 )-----------( 402      ( 23 Dec 2024 08:06 )             34.1     12-23    138  |  107  |  21  ----------------------------<  110[H]  4.2   |  27  |  1.13    Ca    9.1      23 Dec 2024 08:06  Phos  3.3     12-22  Mg     2.1     12-22    TPro  7.6  /  Alb  2.9[L]  /  TBili  0.3  /  DBili  x   /  AST  19  /  ALT  47  /  AlkPhos  108  12-22      WBC Trend:  WBC Count: 13.85 (12-23-24 @ 08:06)  WBC Count: 13.17 (12-22-24 @ 07:05)  WBC Count: 13.40 (12-21-24 @ 10:28)      Creatine Trend:  Creatinine: 1.13 (12-23)  Creatinine: 1.29 (12-22)  Creatinine: 1.30 (12-21)      Liver Biochemical Testing Trend:  Alanine Aminotransferase (ALT/SGPT): 47 (12-22)  Alanine Aminotransferase (ALT/SGPT): 51 (12-21)  Alanine Aminotransferase (ALT/SGPT): 46 *H* (11-27)  Alanine Aminotransferase (ALT/SGPT): 30 (11-26)  Alanine Aminotransferase (ALT/SGPT): 28 (11-25)  Aspartate Aminotransferase (AST/SGOT): 19 (12-22-24 @ 07:05)  Aspartate Aminotransferase (AST/SGOT): 22 (12-21-24 @ 10:28)  Aspartate Aminotransferase (AST/SGOT): 47 (11-27-24 @ 04:01)  Aspartate Aminotransferase (AST/SGOT): 46 (11-26-24 @ 04:04)  Aspartate Aminotransferase (AST/SGOT): 50 (11-25-24 @ 04:55)  Bilirubin Total: 0.3 (12-22)  Bilirubin Total: 0.4 (12-21)  Bilirubin Total: 0.4 (11-27)  Bilirubin Total: 0.4 (11-26)  Bilirubin Total: 0.4 (11-25)      Trend LDH      Urinalysis Basic - ( 23 Dec 2024 08:06 )    Color: x / Appearance: x / SG: x / pH: x  Gluc: 110 mg/dL / Ketone: x  / Bili: x / Urobili: x   Blood: x / Protein: x / Nitrite: x   Leuk Esterase: x / RBC: x / WBC x   Sq Epi: x / Non Sq Epi: x / Bacteria: x        MICROBIOLOGY:    Culture - Fungal, Other (collected 21 Dec 2024 19:38)  Source: .Other  Preliminary Report (23 Dec 2024 09:32):    Testing in progress    Culture - Wound Aerobic/Anaerobic (collected 21 Dec 2024 19:38)  Source: .Surgical Swab  Preliminary Report (23 Dec 2024 09:39):    Few Staphylococcus aureus  Organism: Staphylococcus aureus (24 Dec 2024 09:07)  Organism: Staphylococcus aureus (24 Dec 2024 09:07)      Method Type: LILY      -  Ceftaroline: S <=0.5      -  Clindamycin: R <=0.25 This isolate is presumed to be clindamycin resistant based on detection of inducible resistance. Clindamycin may still be effective in some patients.      -  Erythromycin: R >4      -  Gentamicin: S <=4 Should not be used as monotherapy      -  Oxacillin: S <=0.25 Oxacillin predicts susceptibility for dicloxacillin, methicillin, and nafcillin      -  Penicillin: R >2      -  Rifampin: S <=1 Should not be used as monotherapy      -  Tetracycline: S <=4      -  Trimethoprim/Sulfamethoxazole: S <=0.5/9.5      -  Vancomycin: S 0.5    Culture - Fungal, Other (collected 21 Dec 2024 19:38)  Source: .Other  Preliminary Report (23 Dec 2024 09:32):    Testing in progress    Culture - Wound Aerobic/Anaerobic (collected 21 Dec 2024 19:38)  Source: .Surgical Swab  Preliminary Report (23 Dec 2024 09:41):    Few Staphylococcus aureus  Organism: Staphylococcus aureus (24 Dec 2024 09:05)  Organism: Staphylococcus aureus (24 Dec 2024 09:05)      Method Type: LILY      -  Clindamycin: R <=0.25 This isolate is presumed to be clindamycin resistant based on detection of inducible resistance. Clindamycin may still be effective in some patients.      -  Erythromycin: R >4      -  Gentamicin: S <=4 Should not be used as monotherapy      -  Oxacillin: S 0.5 Oxacillin predicts susceptibility for dicloxacillin, methicillin, and nafcillin      -  Penicillin: R >2      -  Rifampin: S <=1 Should not be used as monotherapy      -  Tetracycline: S <=4      -  Trimethoprim/Sulfamethoxazole: S <=0.5/9.5      -  Vancomycin: S 1    Culture - Fungal, Other (collected 21 Dec 2024 19:38)  Source: .Other  Preliminary Report (23 Dec 2024 09:32):    Testing in progress    Culture - Wound Aerobic/Anaerobic (collected 21 Dec 2024 19:38)  Source: .Surgical Swab  Preliminary Report (23 Dec 2024 09:41):    Few Staphylococcus aureus  Organism: Staphylococcus aureus (24 Dec 2024 09:07)  Organism: Staphylococcus aureus (24 Dec 2024 09:07)      Method Type: LILY      -  Clindamycin: R <=0.25 This isolate is presumed to be clindamycin resistant based on detection of inducible resistance. Clindamycin may still be effective in some patients.      -  Erythromycin: R >4      -  Gentamicin: S <=4 Should not be used as monotherapy      -  Oxacillin: S 0.5 Oxacillin predicts susceptibility for dicloxacillin, methicillin, and nafcillin      -  Penicillin: R >2      -  Rifampin: S <=1 Should not be used as monotherapy      -  Tetracycline: S <=4      -  Trimethoprim/Sulfamethoxazole: S <=0.5/9.5      -  Vancomycin: S 0.5    Culture - Blood (collected 21 Dec 2024 10:28)  Source: .Blood BLOOD  Preliminary Report (23 Dec 2024 16:00):    No growth at 48 Hours    Culture - Blood (collected 21 Dec 2024 10:28)  Source: .Blood BLOOD  Preliminary Report (23 Dec 2024 16:00):    No growth at 48 Hours    C-Reactive Protein: 14.2 (12-23)  C-Reactive Protein: 43.4 (12-21)    Lactate, Blood: 2.0 (12-21 @ 10:28)    Sedimentation Rate, Erythrocyte: 35 mm/hr (12-23-24 @ 08:06)  Sedimentation Rate, Erythrocyte: 43 mm/hr (12-21-24 @ 10:28)      RADIOLOGY:  imaging below personally reviewed

## 2024-12-24 NOTE — DISCHARGE NOTE NURSING/CASE MANAGEMENT/SOCIAL WORK - PATIENT PORTAL LINK FT
You can access the FollowMyHealth Patient Portal offered by [facility] by registering at the following website: [email or web address]. By joining [facility]’s FollowMyHealth portal, you will also be able to view your health information using other applications (apps) compatible with our system.

## 2024-12-24 NOTE — PROGRESS NOTE ADULT - SUBJECTIVE AND OBJECTIVE BOX
Patient seen and examined at bedside. No acute complaints at this time. Pain well controlled. Denies chest pain, shortness of breath, nausea or vomiting.     Vital Signs (24 Hrs):  T(C): 36.9 (12-24-24 @ 00:00), Max: 36.9 (12-24-24 @ 00:00)  HR: 68 (12-24-24 @ 00:00) (67 - 71)  BP: 109/62 (12-24-24 @ 00:00) (108/71 - 122/74)  RR: 18 (12-24-24 @ 00:00) (18 - 18)  SpO2: 96% (12-24-24 @ 00:00) (95% - 100%)  Wt(kg): --    LABS:                          10.6   13.85 )-----------( 402      ( 23 Dec 2024 08:06 )             34.1     12-23    138  |  107  |  21  ----------------------------<  110[H]  4.2   |  27  |  1.13    Ca    9.1      23 Dec 2024 08:06  Phos  3.3     12-22  Mg     2.1     12-22    TPro  7.6  /  Alb  2.9[L]  /  TBili  0.3  /  DBili  x   /  AST  19  /  ALT  47  /  AlkPhos  108  12-22    LIVER FUNCTIONS - ( 22 Dec 2024 07:05 )  Alb: 2.9 g/dL / Pro: 7.6 gm/dL / ALK PHOS: 108 U/L / ALT: 47 U/L / AST: 19 U/L / GGT: x               PE:    General: NAD, resting comfortably in bed  RUE:   Dressing C/D/I  Compartments soft and compressible  +Axillary/Musculocutaneous/Radial/Median/AIN/PIN intact  SILT med/rad/uln/labc/ax  fingers wwp      A/P:  49y M POD 3 s/p R forearm I&D     -WBAT RUE  -Pain Control  -DVT ppx per primary  -Continue abx, FU ID Recs  -FU AM Labs, trend CRP q48  -OR Cx: staph aureus  -BCx: NGTD  -Packing DC'd 12/23.   -Recommend Home wound care to change dry dressings   -Rest, ice, compress and elevate the extremity as needed  -Incentive Spirometry  -Medical comanagement appreciated  -FU Outpatient with Dr. Cardoso on 12/27    No acute orthopaedic surgical intervention indicated at this time. This patient is orthopaedically stable for discharge.   Patient to follow up with Dr. Cardoso this Friday 12/27 as an outpatient for further evaluation and management.   All of the patient's questions and concerns were answered and addressed.    -will discuss with Dr. Cardoso and advise with changes to plan Patient seen and examined at bedside. No acute complaints at this time. Pain well controlled. Denies chest pain, shortness of breath, nausea or vomiting.     Vital Signs (24 Hrs):  T(C): 36.9 (12-24-24 @ 00:00), Max: 36.9 (12-24-24 @ 00:00)  HR: 68 (12-24-24 @ 00:00) (67 - 71)  BP: 109/62 (12-24-24 @ 00:00) (108/71 - 122/74)  RR: 18 (12-24-24 @ 00:00) (18 - 18)  SpO2: 96% (12-24-24 @ 00:00) (95% - 100%)  Wt(kg): --    LABS:                          10.6   13.85 )-----------( 402      ( 23 Dec 2024 08:06 )             34.1     12-23    138  |  107  |  21  ----------------------------<  110[H]  4.2   |  27  |  1.13    Ca    9.1      23 Dec 2024 08:06  Phos  3.3     12-22  Mg     2.1     12-22    TPro  7.6  /  Alb  2.9[L]  /  TBili  0.3  /  DBili  x   /  AST  19  /  ALT  47  /  AlkPhos  108  12-22    LIVER FUNCTIONS - ( 22 Dec 2024 07:05 )  Alb: 2.9 g/dL / Pro: 7.6 gm/dL / ALK PHOS: 108 U/L / ALT: 47 U/L / AST: 19 U/L / GGT: x               PE:    General: NAD, resting comfortably in bed  RUE:   Dressing C/D/I  Compartments soft and compressible  +Axillary/Musculocutaneous/Radial/Median/AIN/PIN intact  SILT med/rad/uln/labc/ax  fingers wwp      A/P:  49y M POD 3 s/p R forearm I&D     -WBAT RUE  -Pain Control  -DVT ppx per primary  -Continue abx, FU ID Recs  -FU AM Labs, trend CRP q48  -OR Cx: staph aureus  -BCx: NGTD  -Packing DC'd 12/23, stable for DC on PO abx  -Recommend Home wound care to change dry dressings   -Rest, ice, compress and elevate the extremity as needed  -Incentive Spirometry  -Medical comanagement appreciated  -FU Outpatient with Dr. Cardoso on 12/27    No acute orthopaedic surgical intervention indicated at this time. This patient is orthopaedically stable for discharge.   Patient to follow up with Dr. Cardoso this Friday 12/27 as an outpatient for further evaluation and management.   All of the patient's questions and concerns were answered and addressed.    -will discuss with Dr. Cardoso and advise with changes to plan

## 2024-12-24 NOTE — PHYSICAL THERAPY INITIAL EVALUATION ADULT - CRITERIA FOR SKILLED THERAPEUTIC INTERVENTIONS
No need for continued skilled acute care PT services at present, the pt should ambulate under nursing supervision PRN./anticipated discharge recommendation

## 2024-12-24 NOTE — OCCUPATIONAL THERAPY INITIAL EVALUATION ADULT - NSACTIVITYREC_GEN_A_OT
N/A- initial skilled evaluation only. Pt independent in all ADLs, functional mobility, functional tasks. No strength or coordination deficits impacting ADL participation noted. Pt educated on RUE exercises within parameters to sustain ROM and MS for self-care ADLs with good understanding reported.

## 2024-12-24 NOTE — DISCHARGE NOTE NURSING/CASE MANAGEMENT/SOCIAL WORK - FINANCIAL ASSISTANCE
[facility] provides services at a reduced cost to those who are determined to be eligible through [facility]’s financial assistance program. Information regarding [facility]’s financial assistance program can be found by going to [email or web address] or by calling [phone].

## 2024-12-24 NOTE — OCCUPATIONAL THERAPY INITIAL EVALUATION ADULT - RANGE OF MOTION EXAMINATION, UPPER EXTREMITY
within restrictions of ace wrap on R forearm- but WFL/bilateral UE Active ROM was WFL  (within functional limits)

## 2024-12-24 NOTE — OCCUPATIONAL THERAPY INITIAL EVALUATION ADULT - PERTINENT HX OF CURRENT PROBLEM, REHAB EVAL
Per EMR, pt is a 50 y/o male who presents with a chief complaint of right arm abscess, cellulitis w/ PMHx of smoking, presents to ED for wound check. Patient admitted at [facility] for ACS, cardiac cath showed 3V occlusion  and patient was transferred to [facility] for eval of CABG. Patient had R radial artery harvest and quadruple bypass on 11/22/24. Procedure complicated by RUE compartment syndrome, hematoma in RUE 2/2 R radial arterial line requiring intraop vascular consult and fasciotomy. On 11/27/24 RUE forearm wound washed out with hydrogen peroxide and closed in layers. Patient noted after the recent procedure he had no issues until 2-3 days ago opened up and had purulent drainage with erythema and warmth to area. In ED patient had CT noted for right forearm abscess, see full imaging results below. Patient underwent I&D by orthopaedics (12/21/24).

## 2024-12-24 NOTE — PHYSICAL THERAPY INITIAL EVALUATION ADULT - PERTINENT HX OF CURRENT PROBLEM, REHAB EVAL
49y old  Male who presents with a chief complaint of right arm abscess, cellulitis   49M w/ PMHx of smoking, presents to ED for wound check. Patient admitted at [facility] for ACS, cardiac cath showed 3V occlusion  and patient was transferred to [facility] for eval of CABG. Patient had R radial artery harvest and quadruple bypass on 11/22/24. Procedure complicated by RUE compartment syndrome, hematoma in RUE 2/2 R radial arterial line requiring intraop vascular consult and fasciotomy. On 11/27/24 RUE forearm wound washed out with hydrogen peroxide and closed in layers. Patient noted after the recent procedure he had no issues until 2-3 days ago opened up and had purulent drainage with erythema and warmth to area. In ED patient had CT noted for right forearm abscess, see full imaging results below. Patient underwent I&D by orthopaedics (12/21/24),

## 2024-12-24 NOTE — PROGRESS NOTE ADULT - ASSESSMENT
49M w/ PMHx of smoking, CABG s/p R radial artery harvest and quadruple bypass on 11/22/24 complicated by  RUE compartment syndrome, hematoma in RUE 2/2 R radial arterial line requiring intraop vascular consult and fasciotomy presents with worsening drainage and erythema RUE concerning for cellulitis with abscess seen on imaging. S/p I&D 12/21    #Right upper extremity cellulitis, abscess  – Status post I&D with Ortho 12/21  – Status post cefazolin, Flagyl, and vancomycin on admission  – Appreciate ID recommendations, will continue with empiric ceftriaxone 2 g once daily, doxycycline 100 mg every 12 hours   – Blood cultures 12/21 X2 pending, wound culture from I&D pending  - Packing DC'd 12/23.   -  Recommend Home wound care to change dry dressings   -  Rest, ice, compress and elevate the extremity as needed  -  FU Outpatient with Dr. Cardoso on 12/27  -  -Discussed with Dr Swann it is MSSA - patient to be discharged on ceftin 500 BIG X 14 day     #NSTEMI  #multivessel CAD status post CABG x 4 (LIMA-LAD, Radial-OM, SVG-OM, LADI-RCA), at [facility], November 2024   – Continue with aspirin 81 mg  – Continue with atorvastatin 40 mg  – Per review of outside records, metoprolol recently decreased given fatigue  – Continue with metoprolol succinate 25 mg once daily    #Postoperative atrial fibrillation  – Appears patient was on oral amiodarone 200 mg once daily but this was not continued on admission   – Resume oral amiodarone 200 mg once daily     #HTN   – c/w home norvasc 2.5 mg once daily     #Normocytic anemia  – Per outpatient records, patient was to follow-up as an outpatient with hematology for possible iron infusions  – Follow-up iron studies in a.m.    DVT PPx: Hep SQ    Dispo - D/C home   
Assessment:  49M with CAD, CABG presents 12/21 with worsening wound on R arm  Recently admitted at [facility] for ACS, cardiac cath showed 3V occlusion  and patient was transferred to [facility] for eval of CABG. Patient had R radial artery harvest and quadruple bypass on 11/22/24. Procedure complicated by RUE compartment syndrome, hematoma in RUE 2/2 R radial arterial line requiring intraop vascular consult and fasciotomy  Reports that no issues after procedure until he got home 3-4 days later and noted erythema and swelling with drainage  No fever or chills  Afebrile on admission, on RA  WBC 13  Cr 1.29  CT with Inflammatory stranding surrounds a heterogeneously peripherally enhancing ovoid mixed subcutaneous and intermuscular collection along the lateral margin of the forearm at the level of the radial proximal to mid diaphysis with deep margin extending between the flexor and extensor compartments and abutting the radial vascular bundle, measures approximately 2.8 x 1.6 x 6.3 cm in maximal AP, transverse, and longitudinal dimensions  BCx 12/21 negative  Wound culture 12/21 with few Staph aureus S to oxacillin    Antimicrobials:  on ceFAZolin  Injectable. 2000 every 8 hours (12/21 --- ) CTX (12/22 --- )  off metroNIDAZOLE  IVPB 500 every 8 hours (12/21  on doxycycline IV    Impression:   #R Arm Abscess, Cellulitis with MSSA  #CAD, CABG  - s/p I&D (12/21)    Recommendations:  - on CTX 2G q24  - on Doxy 100mg q12h  - cultures reviewed  - monitor temperature curve  - trend WBC  - d/c doxycycline   -may change to ceftin 500 mg PO q12h for 10-14 days  - reason for abx use reviewed with patient  - side effects of antibiotic discussed, tolerating abx well so far  - Ortho appreciated, no plans for further procedure, follow up outpatient  - rest per primary team    Clinical team may change from intravenous to oral antibiotics when the following criteria are met:   1. Patient is clinically improving/stable       a)	Improved signs and symptoms of infection from initial presentation       b)	Afebrile for 24 hours       c)	Leukocytosis trending towards normal range   2. Patient is tolerating oral intake   3. Initial/repeat blood cultures are negative     May change to oral abx as above 
49M w/ PMHx of smoking, CABG s/p R radial artery harvest and quadruple bypass on 11/22/24 complicated by  RUE compartment syndrome, hematoma in RUE 2/2 R radial arterial line requiring intraop vascular consult and fasciotomy presents with worsening drainage and erythema RUE concerning for cellulitis with abscess seen on imaging. S/p I&D 12/21    #Right upper extremity cellulitis, abscess  – Status post I&D with Ortho 12/21  – Status post cefazolin, Flagyl, and vancomycin on admission  – Appreciate ID recommendations, will continue with empiric ceftriaxone 2 g once daily, doxycycline 100 mg every 12 hours   – Blood cultures 12/21 X2 pending, wound culture from I&D pending  - Packing DC'd 12/23.   -  Recommend Home wound care to change dry dressings   -  Rest, ice, compress and elevate the extremity as needed  -  FU Outpatient with Dr. Cardoso on 12/27  -  - follow up Staph aureus sensitivity, MSSA or MRSA? - if patient patrick;l not wait can D/C on Doxy 100 BID and cefuroxime 500mg BID X 10 days     #NSTEMI  #multivessel CAD status post CABG x 4 (LIMA-LAD, Radial-OM, SVG-OM, LADI-RCA), at [facility], November 2024   – Continue with aspirin 81 mg  – Continue with atorvastatin 40 mg  – Per review of outside records, metoprolol recently decreased given fatigue  – Continue with metoprolol succinate 25 mg once daily    #Postoperative atrial fibrillation  – Appears patient was on oral amiodarone 200 mg once daily but this was not continued on admission   – Resume oral amiodarone 200 mg once daily     #HTN   – c/w home norvasc 2.5 mg once daily     #Normocytic anemia  – Per outpatient records, patient was to follow-up as an outpatient with hematology for possible iron infusions  – Follow-up iron studies in a.m.    DVT PPx: Hep SQ    Dispo - awaiting sensitivity possible D/C yannick  
Assessment:  49M with CAD, CABG presents 12/21 with worsening wound on R arm  Recently admitted at [facility] for ACS, cardiac cath showed 3V occlusion  and patient was transferred to [facility] for eval of CABG. Patient had R radial artery harvest and quadruple bypass on 11/22/24. Procedure complicated by RUE compartment syndrome, hematoma in RUE 2/2 R radial arterial line requiring intraop vascular consult and fasciotomy  Reports that no issues after procedure until he got home 3-4 days later and noted erythema and swelling with drainage  No fever or chills  Afebrile on admission, on RA  WBC 13  Cr 1.29  CT with Inflammatory stranding surrounds a heterogeneously peripherally enhancing ovoid mixed subcutaneous and intermuscular collection along the lateral margin of the forearm at the level of the radial proximal to mid diaphysis with deep margin extending between the flexor and extensor compartments and abutting the radial vascular bundle, measures approximately 2.8 x 1.6 x 6.3 cm in maximal AP, transverse, and longitudinal dimensions  BCx 12/21 negative  Wound culture 12/21 with few Staph aureus, pending sensitivity     Antimicrobials:  ceFAZolin  Injectable. 2000 every 8 hours (12/21 --- ) CTX (12/22 --- )  metroNIDAZOLE  IVPB 500 every 8 hours (12/21 --- ) Doxy (12/22 --- )    Impression:   #R Arm Abscess, Cellulitis, Staph aureus infection   #CAD, CABG  - s/p I&D (12/21)    Recommendations:  - continue empiric CTX 2G q24  - continue Doxy 100mg q12  - monitor temperature curve  - trend WBC  - reason for abx use reviewed with patient  - side effects of antibiotic discussed, tolerating abx well so far  - follow up Staph aureus sensitivity, MSSA or MRSA? --- hoping for oral antibiotic for discharge planning  - Ortho appreciated, no plans for further procedure, follow up outpatient  - rest per primary team    Clinical team may change from intravenous to oral antibiotics when the following criteria are met:   1. Patient is clinically improving/stable       a)	Improved signs and symptoms of infection from initial presentation       b)	Afebrile for 24 hours       c)	Leukocytosis trending towards normal range   2. Patient is tolerating oral intake   3. Initial/repeat blood cultures are negative OR do not need to wait for preliminary blood cultures to result    Cannot advise changing to oral antibiotic therapy until culture sensitivity is available.
Pleasant 49M w/ PMHx of smoking, CABG s/p R radial artery harvest and quadruple bypass on 11/22/24 complicated by  RUE compartment syndrome, hematoma in RUE 2/2 R radial arterial line requiring intraop vascular consult and fasciotomy presents with worsening drainage and erythema RUE concerning for cellulitis with abscess seen on imaging. S/p I&D 12/21    #Right upper extremity cellulitis, abscess  – Status post I&D with Ortho 12/21  – Status post cefazolin, Flagyl, and vancomycin on admission  – Appreciate ID recommendations, will continue with empiric ceftriaxone 2 g once daily, doxycycline 100 mg every 12 hours  – Blood cultures 12/21 X2 pending, wound culture from I&D pending    #NSTEMI  #multivessel CAD status post CABG x 4 (LIMA-LAD, Radial-OM, SVG-OM, LADI-RCA), at [facility], November 2024   – Continue with aspirin 81 mg  – Continue with atorvastatin 40 mg  – Per review of outside records, metoprolol recently decreased given fatigue  – Continue with metoprolol succinate 25 mg once daily    #Postoperative atrial fibrillation  – Appears patient was on oral amiodarone 200 mg once daily but this was not continued on admission   – Resume oral amiodarone 200 mg once daily     #HTN   – c/w home norvasc 2.5 mg once daily     #Normocytic anemia  – Per outpatient records, patient was to follow-up as an outpatient with hematology for possible iron infusions  – Follow-up iron studies in a.m.    DVT PPx: prev holding chemical ppx for I&D, hep SQ pending d/w Dr. Cardoso  GI PPx: home pantoprazole

## 2024-12-24 NOTE — PROGRESS NOTE ADULT - PROVIDER SPECIALTY LIST ADULT
Hospitalist
Infectious Disease
Orthopedics
Hospitalist
Infectious Disease
Hospitalist

## 2024-12-24 NOTE — OCCUPATIONAL THERAPY INITIAL EVALUATION ADULT - ADDITIONAL COMMENTS
Pt reports he typically resides with his GF, but upon d/c he will recover at his mother's house- PH with 2 IGGI, none inside, walk in shower, standard toilet. PTA pt was (I) with all ADL/IADL tasks, walked without AD. +, working, RHD.

## 2024-12-24 NOTE — PROGRESS NOTE ADULT - SUBJECTIVE AND OBJECTIVE BOX
HOSPITALIST PROGRESS NOTE:  SUBJECTIVE:  PCP:  Chief Complaint: Patient is a 49y old  Male who presents with a chief complaint of right arm abscess, cellulitis (22 Dec 2024 11:19)      HPI:  49M w/ PMHx of smoking, presents to ED for wound check. Patient admitted at [facility] for ACS, cardiac cath showed 3V occlusion  and patient was transferred to [facility] for eval of CABG. Patient had R radial artery harvest and quadruple bypass on 11/22/24. Procedure complicated by RUE compartment syndrome, hematoma in RUE 2/2 R radial arterial line requiring intraop vascular consult and fasciotomy. On 11/27/24 RUE forearm wound washed out with hydrogen peroxide and closed in layers. Patient noted after the recent procedure he had no issues until 2-3 days ago opened up and had purulent drainage with erythema and warmth to area. In ED patient had CT noted for right forearm abscess, see full imaging results below. Patient underwent I&D by orthopaedics (12/21/24), Admitted to  for further care.     (21 Dec 2024 21:07)    12/23:  Above reviewed; patient wants to go home but sensitivty not back; Patient has no other complaints;   12/24:  no overnight event; seen by id and cleared for D/C    Allergies:  No Known Allergies    REVIEW OF SYSTEMS:  See HPI. All other review of systems is negative unless indicated above.     OBJECTIVE  Physical Exam:  Vital Signs Last 24 Hrs  T(C): 36.4 (24 Dec 2024 08:00), Max: 36.9 (24 Dec 2024 00:00)  T(F): 97.5 (24 Dec 2024 08:00), Max: 98.5 (24 Dec 2024 00:00)  HR: 64 (24 Dec 2024 08:00) (64 - 71)  BP: 111/74 (24 Dec 2024 08:00) (109/62 - 122/74)  BP(mean): 83 (23 Dec 2024 16:00) (83 - 83)  RR: 18 (24 Dec 2024 08:00) (18 - 18)  SpO2: 97% (24 Dec 2024 08:00) (96% - 100%)    Parameters below as of 24 Dec 2024 08:00  Patient On (Oxygen Delivery Method): room air      Constitutional: NAD, awake and alert  Neurological: AAO x 3, no focal deficits  HEENT: PERRLA, EOMI, MMM  Neck: Soft and supple, No LAD, No JVD  Respiratory: Breath sounds are clear bilaterally, No wheezing, rales or rhonchi  Cardiovascular: S1 and S2, regular rate and rhythm; no Murmurs, gallops or rubs  Gastrointestinal: Bowel Sounds present, soft, nontender, nondistended, no guarding, no rebound tenderness  Back: No CVA tenderness   Extremities: No peripheral edema +RUE wrapped in gauze and ACE bandage   Vascular: 2+ peripheral pulses  Musculoskeletal: 5/5 strength b/l upper and lower extremities  Skin: No rashes  Breast: Deferred  Rectal: Deferred    MEDICATIONS  (STANDING):  acetaminophen     Tablet .. 1000 milliGRAM(s) Oral every 8 hours  aMIOdarone    Tablet 200 milliGRAM(s) Oral daily  aspirin enteric coated 81 milliGRAM(s) Oral daily  cefTRIAXone Injectable. 2000 milliGRAM(s) IV Push every 24 hours  dextrose 5%. 1000 milliLiter(s) (50 mL/Hr) IV Continuous <Continuous>  dextrose 5%. 1000 milliLiter(s) (100 mL/Hr) IV Continuous <Continuous>  dextrose 50% Injectable 25 Gram(s) IV Push once  dextrose 50% Injectable 12.5 Gram(s) IV Push once  dextrose 50% Injectable 25 Gram(s) IV Push once  doxycycline IVPB 100 milliGRAM(s) IV Intermittent every 12 hours  glucagon  Injectable 1 milliGRAM(s) IntraMuscular once  heparin   Injectable 5000 Unit(s) SubCutaneous every 8 hours  influenza   Vaccine 0.5 milliLiter(s) IntraMuscular once  lactated ringers. 1000 milliLiter(s) (100 mL/Hr) IV Continuous <Continuous>  metoprolol succinate ER 25 milliGRAM(s) Oral daily  naloxone Injectable 0.4 milliGRAM(s) IV Push once  pantoprazole    Tablet 40 milliGRAM(s) Oral before breakfast  polyethylene glycol 3350 17 Gram(s) Oral daily  rosuvastatin 40 milliGRAM(s) Oral at bedtime  senna 2 Tablet(s) Oral at bedtime    Lab Results:  CBC  CBC Full  -  ( 24 Dec 2024 06:50 )  WBC Count : 12.01 K/uL  RBC Count : 3.91 M/uL  Hemoglobin : 11.0 g/dL  Hematocrit : 35.5 %  Platelet Count - Automated : 388 K/uL  Mean Cell Volume : 90.8 fl  Mean Cell Hemoglobin : 28.1 pg  Mean Cell Hemoglobin Concentration : 31.0 g/dL  Auto Neutrophil # : x  Auto Lymphocyte # : x  Auto Monocyte # : x  Auto Eosinophil # : x  Auto Basophil # : x  Auto Neutrophil % : x  Auto Lymphocyte % : x  Auto Monocyte % : x  Auto Eosinophil % : x  Auto Basophil % : x    .		Differential:	[] Automated		[] Manual  Chemistry                        11.0   12.01 )-----------( 388      ( 24 Dec 2024 06:50 )             35.5     12-24    138  |  107  |  23  ----------------------------<  99  5.1   |  28  |  1.30    Ca    9.6      24 Dec 2024 06:50          Urinalysis Basic - ( 24 Dec 2024 06:50 )    Color: x / Appearance: x / SG: x / pH: x  Gluc: 99 mg/dL / Ketone: x  / Bili: x / Urobili: x   Blood: x / Protein: x / Nitrite: x   Leuk Esterase: x / RBC: x / WBC x   Sq Epi: x / Non Sq Epi: x / Bacteria: x    MICROBIOLOGY/CULTURES:  Culture Results:   Few Staphylococcus aureus (12-21 @ 19:38)  Culture Results:   Testing in progress (12-21 @ 19:38)  Culture Results:   Few Staphylococcus aureus (12-21 @ 19:38)  Culture Results:   Testing in progress (12-21 @ 19:38)  Culture Results:   Few Staphylococcus aureus (12-21 @ 19:38)  Culture Results:   Testing in progress (12-21 @ 19:38)  Culture Results:   No growth at 48 Hours (12-21 @ 10:28)  Culture Results:   No growth at 48 Hours (12-21 @ 10:28)      RADIOLOGY RESULTS:    RADIOLOGY/EKG:    < from: CT Upper Extremity w/ IV Cont, Right (12.21.24 @ 10:56) >    IMPRESSION:    Small volume neck mixed subcutaneous and intermuscular abscess along the   lateral margin of right proximal to mid forearm with deep margin   extending between the flexor and extensor compartments and abutting the   radial vascular bundle. No tracking soft tissue emphysema.    < end of copied text >  < from: Xray Chest 2 Views PA/Lat (12.09.24 @ 13:04) >    Minimal scattered lower lung focal atelectasis. Mild hypoinflation.   Postop change. No pneumothorax. No significant pleural effusion. No lung   consolidation.    < end of copied text >

## 2024-12-24 NOTE — OCCUPATIONAL THERAPY INITIAL EVALUATION ADULT - MD ORDER
"OT Evaluate and Treat"- MD orders received. Chart reviewed, contents noted, conferred with RN, cleared for OT IE, pt with c/o L flank pain, notified RN.

## 2024-12-24 NOTE — OCCUPATIONAL THERAPY INITIAL EVALUATION ADULT - GENERAL OBSERVATIONS, REHAB EVAL
Pt rec'd semi-flynn position in bed, bed alarmed, ace wrap covering R forearm, +PIV, lines intact, agreeable to OT IE.

## 2024-12-26 ENCOUNTER — TRANSCRIPTION ENCOUNTER (OUTPATIENT)
Age: 49
End: 2024-12-26

## 2024-12-26 LAB
CULTURE RESULTS: SIGNIFICANT CHANGE UP
CULTURE RESULTS: SIGNIFICANT CHANGE UP
SPECIMEN SOURCE: SIGNIFICANT CHANGE UP
SPECIMEN SOURCE: SIGNIFICANT CHANGE UP

## 2024-12-27 LAB
CULTURE RESULTS: ABNORMAL
ORGANISM # SPEC MICROSCOPIC CNT: ABNORMAL
ORGANISM # SPEC MICROSCOPIC CNT: SIGNIFICANT CHANGE UP
SPECIMEN SOURCE: SIGNIFICANT CHANGE UP

## 2025-01-02 DIAGNOSIS — L03.113 CELLULITIS OF RIGHT UPPER LIMB: ICD-10-CM

## 2025-01-02 DIAGNOSIS — I10 ESSENTIAL (PRIMARY) HYPERTENSION: ICD-10-CM

## 2025-01-02 DIAGNOSIS — Z23 ENCOUNTER FOR IMMUNIZATION: ICD-10-CM

## 2025-01-02 DIAGNOSIS — I25.10 ATHEROSCLEROTIC HEART DISEASE OF NATIVE CORONARY ARTERY WITHOUT ANGINA PECTORIS: ICD-10-CM

## 2025-01-02 DIAGNOSIS — F17.200 NICOTINE DEPENDENCE, UNSPECIFIED, UNCOMPLICATED: ICD-10-CM

## 2025-01-02 DIAGNOSIS — Z79.82 LONG TERM (CURRENT) USE OF ASPIRIN: ICD-10-CM

## 2025-01-02 DIAGNOSIS — I25.2 OLD MYOCARDIAL INFARCTION: ICD-10-CM

## 2025-01-02 DIAGNOSIS — B95.61 METHICILLIN SUSCEPTIBLE STAPHYLOCOCCUS AUREUS INFECTION AS THE CAUSE OF DISEASES CLASSIFIED ELSEWHERE: ICD-10-CM

## 2025-01-02 DIAGNOSIS — I48.91 UNSPECIFIED ATRIAL FIBRILLATION: ICD-10-CM

## 2025-01-02 DIAGNOSIS — D72.829 ELEVATED WHITE BLOOD CELL COUNT, UNSPECIFIED: ICD-10-CM

## 2025-01-02 DIAGNOSIS — D64.9 ANEMIA, UNSPECIFIED: ICD-10-CM

## 2025-01-02 DIAGNOSIS — I97.190 OTHER POSTPROCEDURAL CARDIAC FUNCTIONAL DISTURBANCES FOLLOWING CARDIAC SURGERY: ICD-10-CM

## 2025-01-02 DIAGNOSIS — Z95.1 PRESENCE OF AORTOCORONARY BYPASS GRAFT: ICD-10-CM

## 2025-01-02 DIAGNOSIS — Z79.891 LONG TERM (CURRENT) USE OF OPIATE ANALGESIC: ICD-10-CM

## 2025-01-02 DIAGNOSIS — Z79.899 OTHER LONG TERM (CURRENT) DRUG THERAPY: ICD-10-CM

## 2025-01-02 DIAGNOSIS — L02.413 CUTANEOUS ABSCESS OF RIGHT UPPER LIMB: ICD-10-CM

## 2025-01-13 ENCOUNTER — APPOINTMENT (OUTPATIENT)
Dept: CARDIOTHORACIC SURGERY | Facility: CLINIC | Age: 50
End: 2025-01-13
Payer: COMMERCIAL

## 2025-01-13 DIAGNOSIS — Z95.1 PRESENCE OF AORTOCORONARY BYPASS GRAFT: ICD-10-CM

## 2025-01-13 PROCEDURE — 99024 POSTOP FOLLOW-UP VISIT: CPT

## 2025-01-13 RX ORDER — METOPROLOL SUCCINATE 25 MG/1
25 TABLET, EXTENDED RELEASE ORAL DAILY
Qty: 30 | Refills: 0 | Status: ACTIVE | COMMUNITY

## 2025-02-20 NOTE — ED ADULT NURSE REASSESSMENT NOTE - AS PAIN REST
----- Message from Johnson Wilson MD sent at 2/20/2025 10:55 AM CST -----  Regarding: Progress note/medication list  Dear nursing staff,    Please, would you reach out to patient and inquire if she has documentation from her recent visit with her behavioral health specialist? If she does, please have her bring it to her office visit on 2/21/25. If she doesn't, please ask her to either call her  office, to have them fax us the information, or we can have her sign a release of information.     We look forward to seeing her and hope she is well.    Signed,     Johnson Wilson MD   0 (no pain/absence of nonverbal indicators of pain)

## 2025-05-01 NOTE — DISCHARGE NOTE NURSING/CASE MANAGEMENT/SOCIAL WORK - NSDCPEFALRISK_GEN_ALL_CORE
[facility]: RETURN VISIT     NAME: Christiano Conklin  :2023  MR#:[id number]      DATE of VISIT: 2025     Date of initial visit: 2024     Reason for visit: follow up allergy      HPI  Christiano Conklin is a 23 m.o. male accompanied by mother, referred by PCP for a new patient evaluation of food allergy (dairy)  PCP is Joan Wilcox MD  History is from mother and chart review     CC:Food allergy (dairy)    INTERIM HX AP  - MAY 2025  General: 2 weeks ago he had allergy , he had hives on chest and back. He took little sip of Crush grape soda with mom during dinner time. He woke up with hives on chest and back and eyes were swollen.  He was irritable but it was not that bothering him and he was not itching the hives. Red raised little dots of hives present on back and chest. Mother gave Benadryl 2/3 times and it went away a day after.  Another day also, he took grape soda and he had hives on chest and back. He is eating dairy other day with soy milk.  He eats peanut butter every week and he gets small rash on face. He drinks soy milk. Denies vomiting, diarrhea, SOB. No use of epipen.   Meds: Benadryl and Tylenol PRN.   Nose: No chronic rhinorrhea  Dust Mite Avoidance/Pet exposure: No dust mite avoidance/ Plays with puppies  Lungs:No any issues  Skin: Besides hives, does not have any skin changes. Has not had eczema in the past year.   Foods: SOY MILK, ACCUDENTALLY FGOT A SIP OF WHOLE MILK ON  AND TOLERATD . Tolerates ice cream, Mom has not tried yogurt (only dairy free) , gets butter no issues. Only had a bite of sour cream without issues.   Has frequent baked products - cheese in enchiladas, baked mac and cheese, pancakes and waffles. Tolerates Ranch.  Tolerates peanut, wating seafood fine.   GI/GERD:No GI issues.   School/Social:Stays at home      Tcon with PCP 04/15/25  Accidentally gave him wrong milk the other night. He did drink a little  bit of it, per mom. Gave him whole milk instead of soy milk. Given Saturday night.   Reports pt woke up with swollen eyes 2 days ago (Sun 4/13).  Reports hives for over 2 days, started Sunday morning. Reports they are getting worse.   Gave benadryl which helped with hives    Current Medications[1]      ROS:   Pertinent symptoms in HPI; remainder non contributory or negative.     PMHx NARRATIVE:  Food Allergy:    Hx at initial visit Apr 2024 (age 11 months):  Reactions:   MILK:  - Gave a cheeto on Dec 2nd and developed redness around the mouth and wherever saliva touched  Dietary History:    Was  until Jan 2024 (mother ate dairy, no restrictions on diet)  - Started w/ Similac advance on Jan 19th (age 6 months). With the first feed, within 30 minutes had large emesis, then developed rhinorrhea, cough, facial redness, and mild gertrude-orbital edema. Mother gave benadryl, symptoms improved over the next hour, but still had some gertrude-orbital swelling the next morning. Stopped giving Sim Advance, started on enfamil plant-base, then switched to Sim Soy soon after due to supply issues  - Since then has had Velveeta mac and cheese in March, developed rhinorrhea, lip swelling, and periorbital edema after several bites. Mother gave benadryl and resolved within 30 minutes  - Has tolerated sharp cheddar (cooked in oven) and mexican blend cheese (not cooked) on several occasions since the reaction. Has also tolerated cake, pancake waffles, ice cream.   - Last Thursday had yogurt and developed, mild cough and mild gertrude-orbital edema. Received benadryl and improved  - Has continued to tolerate nilla wafers (eating some in office currently).  Current diet includes: egg, wheat, soy, peanut, tree nuts (bites of granola bars), sesame, rice, beans, finned fish, shellfish      Epinephrine: does not have   APR 2024:   Total IgE 100     Milk IgE 19.50 (H)     Whey, IgE 15.70 (H)     Casein 2.50 (H)                Atopic Dermatitis:     Hx at initial visit Apr 2024 (age 11 months):  The onset of the skin problem was at age: Started around 1 month of life  Course: mod     AND    stable                           Bathing techniques (how often, water temp, tub/shower, time in water, type of soap used): Warm water, <10 minutes, uses Dove baby sensitive skin.  Moisturizer and how often /where applied: Uses Aveeno sensitive  Topical steroids (brands, all over vs hot spots, how often used, on face vs body): TAC 0.025% ointment, on cheeks, ankles, and wrist  Any other topicals tried (Elidel, Protopic, Eucrisa, etc): no  Oral or IM steroids for skin flares: no  Detergents and Fabric Softeners (marc fabric softener sheets): All free and clear  Suspected triggers or exacerbating factors: Milk?  Seen by Dermatology ever: no   PE -> Erythematous cheeks w/ flaking skin. Right wrist w/ raised erythematous patch. Bilateral volar ankles w/ erythema and excoriations   April 2024:  Total IgE 100     Vit D 48  Dog Dander IgE 1.00 (H)   Dust mites < 0.10    Allergic Rhinitis:    Hx at initial visit Apr 2024 (age 11 months):  Allergic Rhinitis has not been suspected/diagnosed previously and the patient does not have ocular or nasal symptoms. Snoring is not a problem /// Family reports snoring   April 2024:  Total IgE 100     Dog Dander IgE 1.00 (H)   Dust mites < 0.10    Lungs:    Hx at initial visit Apr 2024 (age 11 months):  RSV Aug 2023 (3 months), has not needed albuterol since  Wheezing/Coughing: patient has never wheezed or been treated with a bronchodilator since RSV. Exercise tolerance is good and frequent or nocturnal cough is denied           Infectious Agents/Pathogens:    RSV August 2023  Rhinovirus September 2023  Respiratory: Hx of frequent ear infections? Yes, tubes in Nov 2023, improved w/ tubes  Hx of sinus infections? no.   Hx of pneumonias? no .   GI: Hx of significant GI infections? no.   Skin: Hx of staph infections or thrush? Thrush once as  infant   Viral: Warts and molluscum have not been a problem.   COVID infection/exposure/vaccination: COVID in 2024, not vaccinated  No history of severe, prolonged, frequent or unusual infections.     GI: Denies GERD, dysphagia, frequent abdominal pain, nausea, vomiting, diarrhea, constipation.     Other: No issues with hives, drug or  stinging insect reactions     Past Medical History:   Diagnosis Date    LGA (large for gestational age) infant 2023    Need for observation and evaluation of  for sepsis 2023     Past Surgical History:   Procedure Laterality Date    MYRINGOTOMY WITH INSERTION OF VENTILATION TUBE Bilateral 2023    Procedure: MYRINGOTOMY, WITH TYMPANOSTOMY TUBE INSERTION;  Surgeon: Srinivasan Fonseca MD;  Location: [facility] OR [address] [place] [address];  Service: ENT;  Laterality: Bilateral;  15 min/microscope     Allergies as of 2025 - Reviewed 10/30/2024   Allergen Reaction Noted    Milk containing products (dairy) Swelling 2024    Dog dander  2024        ALLERGY FAM HX:     Mother and sister w/ AD. MGM w/ asthma and AR. MGM w/ food allergies. MGA w/ SLE  No other family history of asthma, allergic rhinitis, eczema, drug allergy, food allergy, insect allergy, immunodeficiency, or autoimmune disorders.     ALLERGY SOCIAL HX:      Lives in one household with mom, dad, sister and brother  Pet exposure at home and elsewhere: dog at home since last month  Cigarette smoke exposure (home and elsewhere): Dad smokes outside  Dust Mite Avoidance Measures: no  ; Shares the bedroom: no;   Water damage or visible mold in the home: No  : No, mother cares for him during day (sister in pre-school.     PHYSICAL EXAM:  VITALS:  Vitals:    25 1134   Pulse: 120   Resp: 26     Wt Readings from Last 3 Encounters:   25 14.9 kg (32 lb 15.3 oz)   10/30/24 13.7 kg (30 lb 5 oz)   24 12.7 kg (28 lb)            VITAL SIGNS: reviewed.   NUTRITIONAL STATUS: Growth charts reviewed -  Weight 96%'ile, Height 92%'ile.   GENERAL APPEARANCE: well nourished, alert, active, NAD.   SKIN: Mild redness on face noted  HEAD: normocephalic, no alopecia.   EYES: EOMI, conjunctivae clear, no infraorbital shiners.   EARS: TT's in place bilaterally, no fluid visible.   NOSE: no nasal flaring, mucosa pink with normal turbinates, no drainage  ORAL CAVITY: moist mucus membranes, teeth in good repair, no lesions or ulcers, unable to visulize posterior pharynx.  LYMPH: no significant lymphadenopathy .   NECK: supple, thyroid normal.   CHEST: normal contour, no tenderness.   LUNGS: auscultation clear bilaterally, breath sounds normal.  HEART: RSR, no murmur, no rub.   ABDOMEN: not examined  MS/BACK joints within normal limits throughout .   DIGITS: no cyanosis, edema, clubbing.   NEURO: non-focal .   PSYCH: normal mood and affect for age.   EXTREMITIES: tone and power are equal and symmetrical.                     RECORD REVIEW/PRIOR TESTING  NOTES  03/07/2024 PCP  Christiano Conklin is a 9 m.o. male who is here with mother for Well Child (Mom has some concerns about berry allergy )  HPI  Current concerns include: concern for milk allergy.  Developed redness around his eyes and lips after taking the similac advance. Also gets hive type rash when his skin comes into contact with dairy (for example a cheeto).   Nutrition:  Current diet: formula - recently switched. Similac soy. Eats everything but avoiding dairy.      LABS  04/18/2024   CBC Auto Differential     Collection Time: 04/18/24  2:45 PM   Result Value Ref Range     WBC 8.95 6.00 - 17.50 K/uL     RBC 4.58 3.70 - 5.30 M/uL     Hemoglobin 11.8 10.5 - 13.5 g/dL     Hematocrit 37.3 33.0 - 39.0 %     MCV 81 70 - 86 fL     MCH 25.8 23.0 - 31.0 pg     MCHC 31.6 30.0 - 36.0 g/dL     RDW 13.2 11.5 - 14.5 %     Platelets 337 150 - 450 K/uL     MPV 9.3 9.2 - 12.9 fL     Immature Granulocytes 0.1 0.0 - 0.5 %     Gran # (ANC) 1.5 1.0 - 8.5 K/uL     Immature Grans (Abs)  0.01 0.00 - 0.04 K/uL     Lymph # 6.7 3.0 - 10.5 K/uL     Mono # 0.5 0.2 - 1.2 K/uL     Eos # 0.3 0.0 - 0.8 K/uL     Baso # 0.05 0.01 - 0.06 K/uL     nRBC 0 0 /100 WBC     Gran % 16.2 (L) 17.0 - 49.0 %     Lymph % 74.4 (H) 50.0 - 60.0 %     Mono % 5.7 3.8 - 13.4 %     Eosinophil % 3.0 0.0 - 4.1 %     Basophil % 0.6 0.0 - 0.6 %     Differential Method Automated     Vitamin D     Collection Time: 04/18/24  2:45 PM   Result Value Ref Range     Vit D, 25-Hydroxy 48 30 - 96 ng/mL   IgE     Collection Time: 04/18/24  2:45 PM   Result Value Ref Range     Total IgE 100 IU/mL   Allergen D Farinae (Dust Mite) IgE     Collection Time: 04/18/24  2:45 PM   Result Value Ref Range     D. farinae <0.10 <0.10 kU/L     D. farinae Class CLASS 0     Allergen D Pteronyssinus (Dust Mite) IgE     Collection Time: 04/18/24  2:45 PM   Result Value Ref Range     D. pteronyssinus Dust Mite IgE <0.10 <0.10 kU/L     D. pteronyssinus Class CLASS 0     Allergen Dog Dander IgE     Collection Time: 04/18/24  2:45 PM   Result Value Ref Range     Dog Dander IgE 1.00 (H) <0.10 kU/L     Dog Dander Class CLASS 2     Cat epithelium IgE     Collection Time: 04/18/24  2:45 PM   Result Value Ref Range     Cat Dander IgE <0.10 <0.10 kU/L     Cat Epithelium Class CLASS 0     ALLERGEN MILK     Collection Time: 04/18/24  2:45 PM   Result Value Ref Range     Milk IgE 19.50 (H) <0.10 kU/L     Cow's Milk Class CLASS 4     ALLERGEN WHEY IGE     Collection Time: 04/18/24  2:45 PM   Result Value Ref Range     Whey, IgE 15.70 (H) <0.10 kU/L     Whey Class CLASS 3     ALLERGEN CASEIN     Collection Time: 04/18/24  2:45 PM   Result Value Ref Range     Casein 2.50 (H) <0.10 kU/L     Casein Class CLASS 2     Hemoglobin     Collection Time: 05/30/24 11:46 AM   Result Value Ref Range     Hemoglobin 12.8 10.5 - 13.5 g/dL   Lead, Blood (Capillary)     Collection Time: 05/30/24 11:46 AM   Result Value Ref Range     Venous/Capillary capillary       Lead, Blood (Capillary) <1.0  "<3.5 mcg/dL        ASSESSMENT/PLAN:  1. Milk allergy        2. History of urticaria  cetirizine (ZYRTEC) 1 mg/mL syrup      3. Allergy to dogs  cetirizine (ZYRTEC) 1 mg/mL syrup      4. History of food anaphylaxis            Milk allergy: Given current tolerance of multiple forms of milk (although primarily baked milk) will challenge   - Continue consuming baked milk products that he has previously tolerated (Nilla wafers, cake, pancakes, waffles).  April 2024  Milk IgE 19.50 (H)     Whey, IgE 15.70 (H)     Casein 2.50 (H)   Will challenge to "liquid" milk at next visit.    Sent prescription for Zyrtec (Cetirizine) for 3 mls once a day for hives or itching.. Can give a second dose if necessary.    For the dairy allergy, please bring bis bottle/sippy cup - what ever he is used to drinking the soy milk with - and bring both his soy milk and regular milk. We will challenge hi by mixing the two and observing him. If you want to bribing some regular dairy yogurt too, would do that - in a flavor you think he would like.     Would feed him over ~ 45 to 60 minutes, then watch for an hour after - so expect to be here about 2 hours.     The day of the appointment, give him a light breakfast so he is hungry, You can bring snacks as well as the dairy items.     Monday mornings:   May 5 at 9:45  May 12 at 9:30  May 19 or 26 at 9:00    History of anaphylaxis to milk  - challenge in clinic as above     Infantile eczema/Allergy to dogs/Hx of urticaria  April 2024  IgE 100  Dog Dander IgE 1.00      - Continue sensitive skin care and prn Zyrtec    FOLLOW UP: for challenge - dates as above                                                                                                                                                                                                                                                                                                                                                                 "                                                                                                                            ATTESTATION:  Parent/guardian verbalizes an understanding of the plan of care and has been educated on the purpose, side effects, and desired outcomes of any new medications given with today's visit. All questions were answered to the family's satisfaction as expressed at the close of the visit.    No Resident or Fellow participated in this encounter.  I personally reviewed and recorded the pertinent labs, tests, and other relevant data and performed the history and exam. I discussed my findings and plan with the family, including the education and interpretation of test results as above..       Irene Finney MD, FAAAAI, FAAP  [facility]  [address] [place] [address]   [phone]  Fax [phone]         [1]   Current Outpatient Medications:     acetaminophen (TYLENOL) 160 mg/5 mL (5 mL) Soln, Take 4.19 mLs (134.08 mg total) by mouth every 6 (six) hours as needed (pain). (Patient not taking: Reported on 5/1/2025), Disp: , Rfl:     betamethasone dipropionate (DIPROLENE) 0.05 % ointment, Apply topically once daily. for 14 days (Patient not taking: Reported on 2023), Disp: 15 g, Rfl: 1    EPINEPHrine (EPIPEN JR) 0.15 mg/0.3 mL pen injection, One IM autoinjection to outer thigh if needed for anaphylaxis per Allergy Action Plan (Patient not taking: Reported on 5/1/2025), Disp: 2 each, Rfl: 2    ibuprofen 20 mg/mL oral liquid, Take 4.5 mLs (90 mg total) by mouth every 6 (six) hours as needed for Pain. (Patient not taking: Reported on 5/1/2025), Disp: , Rfl:     nystatin (MYCOSTATIN) ointment, Apply topically 4 (four) times daily. (Patient not taking: Reported on 5/1/2025), Disp: 30 g, Rfl: 1    triamcinolone acetonide 0.025% (KENALOG) 0.025 % Oint, Apply topically 2 (two) times daily. (Patient not taking: Reported on 5/1/2025),  Disp: 80 g, Rfl: 0     For information on Fall & Injury Prevention, visit: [email or web address] OR  [email or web address] OR  https://www.cdc.gov/steadi/patient.html

## (undated) DEVICE — DRAPE OR CAMERA COVER

## (undated) DEVICE — DRSG TEGADERM 4 X 4.75"

## (undated) DEVICE — SUT SILK 1 30" MH

## (undated) DEVICE — BLADE SCALPEL SAFETY #10 WITH PLASTIC GREEN HANDLE

## (undated) DEVICE — ELCTR ZOLL DEFIBRILLATOR PAD NO REPLACEMENT

## (undated) DEVICE — PACK PROC CV DRAPE

## (undated) DEVICE — CATH IV SAFE BC 24G X 0.75" (YELLOW)

## (undated) DEVICE — SUT VICRYL 3-0 27" MH

## (undated) DEVICE — DRSG STOCKINETTE IMPERVIOUS MED 8 X 38"

## (undated) DEVICE — DRSG ACE BANDAGE 6" LF STERILE

## (undated) DEVICE — SUT PROLENE BV 130-5 8-0

## (undated) DEVICE — SUT SILK 4-0 12-30" TIES

## (undated) DEVICE — SUT SILK 3-0 30" BB

## (undated) DEVICE — SUT VICRYL 1 27" CT-1

## (undated) DEVICE — SUT VICRYL 4-0 18" PS-2 UNDYED

## (undated) DEVICE — GOWN LG

## (undated) DEVICE — DRSG STOCKINETTE IMPERVIOUS XL 12 X 48"

## (undated) DEVICE — CATH TRIOX OXIMETRY 8F 3 LUMENS

## (undated) DEVICE — VASOVIEW HEMOPRO 2

## (undated) DEVICE — WARMING BLANKET UPPER ADULT

## (undated) DEVICE — MULTIPLE PERFUSION SET FEMALE 1 INLET LEG W 4 LEGS / VENT 15" (RED/RED)

## (undated) DEVICE — SUT PROLENE 3-0 36" SH

## (undated) DEVICE — SUT SILK 5-0 60" TIES

## (undated) DEVICE — SPECIMEN CONTAINER 100ML

## (undated) DEVICE — DRSG KLING 4"

## (undated) DEVICE — DRAPE SLUSH / WARMER 44 X 66"

## (undated) DEVICE — GLV 7.5 PROTEXIS (WHITE)

## (undated) DEVICE — STOPCOCK 3 WAY

## (undated) DEVICE — SUT SILK 2-0 30" TIES

## (undated) DEVICE — DRAPE IOBAN 33" X 23"

## (undated) DEVICE — TUBING SMOKE EVAC 3/8" X 10FT FOR NEPTUNE

## (undated) DEVICE — ELCTR BOVIE PENCIL HANDPIECE ROCKER SWITCH 15FT

## (undated) DEVICE — BLADE STERN SYS 6 305X1MM

## (undated) DEVICE — KIT DRSG CVC CHG 40/CA

## (undated) DEVICE — PUNCH VASC STD 7.75" HANDLE 2.8MM DISP

## (undated) DEVICE — ELCTR STRYKER NEPTUNE SMOKE EVACUATION PENCIL (GREEN)

## (undated) DEVICE — DRSG KERLIX ROLL LG 4.5"

## (undated) DEVICE — VENODYNE/SCD SLEEVE CALF MEDIUM

## (undated) DEVICE — STEALTH CLAMP INSERT FIBRA/FIBRA 90MM

## (undated) DEVICE — DRAPE TOWEL BLUE 17" X 24"

## (undated) DEVICE — GAUZE SPONGE 12PLY DMT MT 8X4

## (undated) DEVICE — PACING CABLE (BLUE) ATRIAL TEMP SCREW DOWN 12FT

## (undated) DEVICE — DRAPE LIGHT HANDLE COVER (GREEN)

## (undated) DEVICE — GLV 8 PROTEXIS (WHITE)

## (undated) DEVICE — SUT SILK 1 30" TIES

## (undated) DEVICE — ELCTR BOVIE TIP BLADE MEGADYNE E-Z CLEAN 4" EXTENDED

## (undated) DEVICE — SUT STAINLESS STEEL 6 4-18" CCS

## (undated) DEVICE — GEL AQUSNC PACKET 20GR

## (undated) DEVICE — DRSG ALLEVYN LIFE 6 X 6 (PINK)

## (undated) DEVICE — CATH CV TRAY INSR ST UNIV

## (undated) DEVICE — PREP CHLORAPREP HI-LITE ORANGE 26ML

## (undated) DEVICE — DRSG ACE BANDAGE 6"

## (undated) DEVICE — ELCTR GROUNDING PAD ADULT COVIDIEN

## (undated) DEVICE — POSITIONER FOAM EGG CRATE ULNAR 2PCS (PINK)

## (undated) DEVICE — SUT PROLENE 6-0 30" C-1

## (undated) DEVICE — CLIPPER BLADE GENERAL USE

## (undated) DEVICE — DRSG DERMABOND 0.7ML

## (undated) DEVICE — BLADE SCALPEL SAFETY #11 WITH PLASTIC GREEN HANDLE

## (undated) DEVICE — ELCTR BOVIE TIP BLADE VALLEYLAB 6.5"

## (undated) DEVICE — BLADE SCALPEL SAFETY #15 WITH PLASTIC GREEN HANDLE

## (undated) DEVICE — DRAPE PROBE COVER 5" X 96"

## (undated) DEVICE — DRSG TRACH DRAINAGE 4X4

## (undated) DEVICE — SUT PROLENE 4-0 36" BB

## (undated) DEVICE — DRSG MEPILEX 10 X 25CM (4 X 10") AG

## (undated) DEVICE — SUT VICRYL 3-0 27" SH

## (undated) DEVICE — CHEST DRAIN PLEUR-EVAC DRY/WET ADULT-PEDS SINGLE (QUICK)

## (undated) DEVICE — PREP SCRUB BRUSH W CHG 4%

## (undated) DEVICE — PACK UPPER BODY

## (undated) DEVICE — PACK SCHEINERMAN CABAG

## (undated) DEVICE — ELCTR BOVIE TIP BLADE INSULATED 3" EDGE

## (undated) DEVICE — FOLEY TRAY 16FR 5CC LTX URINE METER TEMP SENSING CLOSED

## (undated) DEVICE — BLOWER MISTER AXIUS WITH IV SET

## (undated) DEVICE — PACING CABLE (BROWN) A/V TEMP SCREW DOWN 12FT

## (undated) DEVICE — TUBING SUCTION NONCONDUCTIVE 6MM X 12FT

## (undated) DEVICE — DRSG ACE BANDAGE 4"

## (undated) DEVICE — PACK SCHEINERMAN OPEN HEART A

## (undated) DEVICE — SUT PROLENE 7-0 30" BV-1

## (undated) DEVICE — TUBING STRYKER PNEUMOCLEAR HIGH FLOW